# Patient Record
Sex: FEMALE | Race: WHITE | NOT HISPANIC OR LATINO | Employment: FULL TIME | URBAN - METROPOLITAN AREA
[De-identification: names, ages, dates, MRNs, and addresses within clinical notes are randomized per-mention and may not be internally consistent; named-entity substitution may affect disease eponyms.]

---

## 2017-01-02 ENCOUNTER — COMMUNICATION - HEALTHEAST (OUTPATIENT)
Dept: FAMILY MEDICINE | Facility: CLINIC | Age: 32
End: 2017-01-02

## 2017-01-05 ENCOUNTER — OFFICE VISIT - HEALTHEAST (OUTPATIENT)
Dept: FAMILY MEDICINE | Facility: CLINIC | Age: 32
End: 2017-01-05

## 2017-01-05 DIAGNOSIS — N91.2 AMENORRHEA: ICD-10-CM

## 2017-01-05 ASSESSMENT — MIFFLIN-ST. JEOR: SCORE: 1397.15

## 2017-01-26 ENCOUNTER — HOSPITAL ENCOUNTER (OUTPATIENT)
Dept: ULTRASOUND IMAGING | Facility: HOSPITAL | Age: 32
Discharge: HOME | End: 2017-01-26
Attending: FAMILY MEDICINE

## 2017-01-26 DIAGNOSIS — N91.2 AMENORRHEA: ICD-10-CM

## 2017-01-30 ENCOUNTER — COMMUNICATION - HEALTHEAST (OUTPATIENT)
Dept: FAMILY MEDICINE | Facility: CLINIC | Age: 32
End: 2017-01-30

## 2017-02-10 ENCOUNTER — COMMUNICATION - HEALTHEAST (OUTPATIENT)
Dept: FAMILY MEDICINE | Facility: CLINIC | Age: 32
End: 2017-02-10

## 2017-02-21 ENCOUNTER — COMMUNICATION - HEALTHEAST (OUTPATIENT)
Dept: FAMILY MEDICINE | Facility: CLINIC | Age: 32
End: 2017-02-21

## 2017-02-28 ENCOUNTER — PRENATAL OFFICE VISIT - HEALTHEAST (OUTPATIENT)
Dept: FAMILY MEDICINE | Facility: CLINIC | Age: 32
End: 2017-02-28

## 2017-02-28 DIAGNOSIS — Z3A.12 12 WEEKS GESTATION OF PREGNANCY: ICD-10-CM

## 2017-02-28 LAB — HIV 1+2 AB+HIV1 P24 AG SERPL QL IA: NEGATIVE

## 2017-02-28 ASSESSMENT — MIFFLIN-ST. JEOR: SCORE: 1405.09

## 2017-03-01 LAB
HBV SURFACE AG SERPL QL IA: NEGATIVE
SYPHILIS RPR SCREEN - HISTORICAL: NORMAL

## 2017-03-02 LAB
HPV INTERPRETATION - HISTORICAL: NORMAL
HPV INTERPRETER - HISTORICAL: NORMAL

## 2017-03-07 LAB
BKR LAB AP ABNORMAL BLEEDING: NO
BKR LAB AP BIRTH CONTROL/HORMONES: NORMAL
BKR LAB AP CERVICAL APPEARANCE: NORMAL
BKR LAB AP GYN ADEQUACY: NORMAL
BKR LAB AP GYN INTERPRETATION: NORMAL
BKR LAB AP HPV REFLEX: NORMAL
BKR LAB AP LMP: NORMAL
BKR LAB AP PATIENT STATUS: NORMAL
BKR LAB AP PREVIOUS ABNORMAL: NO
BKR LAB AP PREVIOUS NORMAL: 2014
HIGH RISK?: NO
PATH REPORT.COMMENTS IMP SPEC: NORMAL
RESULT FLAG (HE HISTORICAL CONVERSION): NORMAL

## 2017-03-28 ENCOUNTER — PRENATAL OFFICE VISIT - HEALTHEAST (OUTPATIENT)
Dept: FAMILY MEDICINE | Facility: CLINIC | Age: 32
End: 2017-03-28

## 2017-03-28 DIAGNOSIS — Z34.82 PRENATAL CARE, SUBSEQUENT PREGNANCY, SECOND TRIMESTER: ICD-10-CM

## 2017-04-24 ENCOUNTER — HOSPITAL ENCOUNTER (OUTPATIENT)
Dept: ULTRASOUND IMAGING | Facility: CLINIC | Age: 32
Discharge: HOME | End: 2017-04-24
Attending: FAMILY MEDICINE

## 2017-04-24 DIAGNOSIS — Z34.82 PRENATAL CARE, SUBSEQUENT PREGNANCY, SECOND TRIMESTER: ICD-10-CM

## 2017-04-25 ENCOUNTER — COMMUNICATION - HEALTHEAST (OUTPATIENT)
Dept: FAMILY MEDICINE | Facility: CLINIC | Age: 32
End: 2017-04-25

## 2017-04-27 ENCOUNTER — PRENATAL OFFICE VISIT - HEALTHEAST (OUTPATIENT)
Dept: FAMILY MEDICINE | Facility: CLINIC | Age: 32
End: 2017-04-27

## 2017-04-27 DIAGNOSIS — Z34.82 PRENATAL CARE, SUBSEQUENT PREGNANCY, SECOND TRIMESTER: ICD-10-CM

## 2017-05-25 ENCOUNTER — PRENATAL OFFICE VISIT - HEALTHEAST (OUTPATIENT)
Dept: FAMILY MEDICINE | Facility: CLINIC | Age: 32
End: 2017-05-25

## 2017-05-25 DIAGNOSIS — Z34.82 PRENATAL CARE, SUBSEQUENT PREGNANCY, SECOND TRIMESTER: ICD-10-CM

## 2017-06-20 ENCOUNTER — PRENATAL OFFICE VISIT - HEALTHEAST (OUTPATIENT)
Dept: FAMILY MEDICINE | Facility: CLINIC | Age: 32
End: 2017-06-20

## 2017-06-20 DIAGNOSIS — Z34.80 PRENATAL CARE, SUBSEQUENT PREGNANCY: ICD-10-CM

## 2017-07-03 ENCOUNTER — PRENATAL OFFICE VISIT - HEALTHEAST (OUTPATIENT)
Dept: FAMILY MEDICINE | Facility: CLINIC | Age: 32
End: 2017-07-03

## 2017-07-03 DIAGNOSIS — Z34.80 PRENATAL CARE, SUBSEQUENT PREGNANCY: ICD-10-CM

## 2017-07-25 ENCOUNTER — PRENATAL OFFICE VISIT - HEALTHEAST (OUTPATIENT)
Dept: FAMILY MEDICINE | Facility: CLINIC | Age: 32
End: 2017-07-25

## 2017-07-25 DIAGNOSIS — Z34.80 PRENATAL CARE, SUBSEQUENT PREGNANCY: ICD-10-CM

## 2017-08-15 ENCOUNTER — PRENATAL OFFICE VISIT - HEALTHEAST (OUTPATIENT)
Dept: FAMILY MEDICINE | Facility: CLINIC | Age: 32
End: 2017-08-15

## 2017-08-15 DIAGNOSIS — Z34.90 PREGNANCY: ICD-10-CM

## 2017-08-22 ENCOUNTER — PRENATAL OFFICE VISIT - HEALTHEAST (OUTPATIENT)
Dept: FAMILY MEDICINE | Facility: CLINIC | Age: 32
End: 2017-08-22

## 2017-08-22 DIAGNOSIS — Z34.90 PREGNANCY: ICD-10-CM

## 2017-08-29 ENCOUNTER — PRENATAL OFFICE VISIT - HEALTHEAST (OUTPATIENT)
Dept: FAMILY MEDICINE | Facility: CLINIC | Age: 32
End: 2017-08-29

## 2017-08-29 DIAGNOSIS — Z34.90 PREGNANCY: ICD-10-CM

## 2017-08-31 ENCOUNTER — COMMUNICATION - HEALTHEAST (OUTPATIENT)
Dept: FAMILY MEDICINE | Facility: CLINIC | Age: 32
End: 2017-08-31

## 2017-08-31 ENCOUNTER — PRENATAL OFFICE VISIT - HEALTHEAST (OUTPATIENT)
Dept: FAMILY MEDICINE | Facility: CLINIC | Age: 32
End: 2017-08-31

## 2017-08-31 DIAGNOSIS — R51.9 HEADACHE: ICD-10-CM

## 2017-08-31 DIAGNOSIS — Z34.80 PRENATAL CARE, SUBSEQUENT PREGNANCY: ICD-10-CM

## 2017-09-05 ENCOUNTER — PRENATAL OFFICE VISIT - HEALTHEAST (OUTPATIENT)
Dept: FAMILY MEDICINE | Facility: CLINIC | Age: 32
End: 2017-09-05

## 2017-09-05 DIAGNOSIS — Z34.90 PREGNANCY: ICD-10-CM

## 2017-09-07 ENCOUNTER — COMMUNICATION - HEALTHEAST (OUTPATIENT)
Dept: FAMILY MEDICINE | Facility: CLINIC | Age: 32
End: 2017-09-07

## 2017-09-07 ENCOUNTER — ANESTHESIA - HEALTHEAST (OUTPATIENT)
Dept: OBGYN | Facility: HOSPITAL | Age: 32
End: 2017-09-07

## 2017-09-11 ENCOUNTER — COMMUNICATION - HEALTHEAST (OUTPATIENT)
Dept: OBGYN | Facility: HOSPITAL | Age: 32
End: 2017-09-11

## 2017-09-11 ENCOUNTER — COMMUNICATION - HEALTHEAST (OUTPATIENT)
Dept: FAMILY MEDICINE | Facility: CLINIC | Age: 32
End: 2017-09-11

## 2017-10-19 ENCOUNTER — OFFICE VISIT - HEALTHEAST (OUTPATIENT)
Dept: FAMILY MEDICINE | Facility: CLINIC | Age: 32
End: 2017-10-19

## 2017-10-19 DIAGNOSIS — F41.9 ANXIETY: ICD-10-CM

## 2017-10-19 DIAGNOSIS — M25.50 JOINT PAIN: ICD-10-CM

## 2017-10-19 ASSESSMENT — MIFFLIN-ST. JEOR: SCORE: 1405.09

## 2017-11-13 ENCOUNTER — COMMUNICATION - HEALTHEAST (OUTPATIENT)
Dept: FAMILY MEDICINE | Facility: CLINIC | Age: 32
End: 2017-11-13

## 2017-11-27 ENCOUNTER — OFFICE VISIT - HEALTHEAST (OUTPATIENT)
Dept: FAMILY MEDICINE | Facility: CLINIC | Age: 32
End: 2017-11-27

## 2017-11-27 DIAGNOSIS — M25.50 ARTHRALGIA: ICD-10-CM

## 2017-11-27 ASSESSMENT — MIFFLIN-ST. JEOR: SCORE: 1400.55

## 2017-11-30 ENCOUNTER — COMMUNICATION - HEALTHEAST (OUTPATIENT)
Dept: FAMILY MEDICINE | Facility: CLINIC | Age: 32
End: 2017-11-30

## 2017-12-01 ENCOUNTER — COMMUNICATION - HEALTHEAST (OUTPATIENT)
Dept: FAMILY MEDICINE | Facility: CLINIC | Age: 32
End: 2017-12-01

## 2017-12-08 ENCOUNTER — COMMUNICATION - HEALTHEAST (OUTPATIENT)
Dept: FAMILY MEDICINE | Facility: CLINIC | Age: 32
End: 2017-12-08

## 2017-12-19 ENCOUNTER — COMMUNICATION - HEALTHEAST (OUTPATIENT)
Dept: FAMILY MEDICINE | Facility: CLINIC | Age: 32
End: 2017-12-19

## 2018-01-04 ENCOUNTER — RECORDS - HEALTHEAST (OUTPATIENT)
Dept: GENERAL RADIOLOGY | Facility: CLINIC | Age: 33
End: 2018-01-04

## 2018-01-04 ENCOUNTER — OFFICE VISIT - HEALTHEAST (OUTPATIENT)
Dept: RHEUMATOLOGY | Facility: CLINIC | Age: 33
End: 2018-01-04

## 2018-01-04 DIAGNOSIS — M05.9 RHEUMATOID ARTHRITIS WITH RHEUMATOID FACTOR, UNSPECIFIED (H): ICD-10-CM

## 2018-01-04 DIAGNOSIS — G89.29 CHRONIC BILATERAL LOW BACK PAIN WITHOUT SCIATICA: ICD-10-CM

## 2018-01-04 DIAGNOSIS — M54.50 CHRONIC BILATERAL LOW BACK PAIN WITHOUT SCIATICA: ICD-10-CM

## 2018-01-04 DIAGNOSIS — M25.50 PAIN IN UNSPECIFIED JOINT: ICD-10-CM

## 2018-01-04 DIAGNOSIS — M25.50 MULTIPLE JOINT PAIN: ICD-10-CM

## 2018-01-04 DIAGNOSIS — M05.9 SEROPOSITIVE RHEUMATOID ARTHRITIS (H): ICD-10-CM

## 2018-01-04 LAB — HEP C HIM: NORMAL

## 2018-01-04 ASSESSMENT — MIFFLIN-ST. JEOR: SCORE: 1382.41

## 2018-01-05 LAB
HCV AB SERPL QL IA: NEGATIVE
HLA-B27 RESULT - HISTORICAL: POSITIVE
INTERPRETATION: NORMAL
QTF INTERPRETATION: NORMAL
QTF MITOGEN - NIL: >10 IU/ML
QTF NIL: 0.02 IU/ML
QTF RESULT: NEGATIVE
QTF TB ANTIGEN - NIL: 0.09 IU/ML

## 2018-01-08 LAB — ANA SER QL: 0.5 U

## 2018-01-23 ENCOUNTER — COMMUNICATION - HEALTHEAST (OUTPATIENT)
Dept: FAMILY MEDICINE | Facility: CLINIC | Age: 33
End: 2018-01-23

## 2018-01-25 ENCOUNTER — OFFICE VISIT - HEALTHEAST (OUTPATIENT)
Dept: FAMILY MEDICINE | Facility: CLINIC | Age: 33
End: 2018-01-25

## 2018-01-25 DIAGNOSIS — J02.9 SORE THROAT: ICD-10-CM

## 2018-01-25 LAB — DEPRECATED S PYO AG THROAT QL EIA: NORMAL

## 2018-01-26 LAB — GROUP A STREP BY PCR: NORMAL

## 2018-02-09 ENCOUNTER — COMMUNICATION - HEALTHEAST (OUTPATIENT)
Dept: FAMILY MEDICINE | Facility: CLINIC | Age: 33
End: 2018-02-09

## 2018-03-23 ENCOUNTER — COMMUNICATION - HEALTHEAST (OUTPATIENT)
Dept: FAMILY MEDICINE | Facility: CLINIC | Age: 33
End: 2018-03-23

## 2018-04-05 ENCOUNTER — TRANSFERRED RECORDS (OUTPATIENT)
Dept: HEALTH INFORMATION MANAGEMENT | Facility: CLINIC | Age: 33
End: 2018-04-05

## 2018-04-05 LAB
ALT SERPL-CCNC: 14 IU/L
CREAT SERPL-MCNC: 0.91 MG/DL (ref 0.57–1.11)
GFR SERPL CREATININE-BSD FRML MDRD: >60 ML/MIN/1.73M2

## 2018-04-24 ENCOUNTER — COMMUNICATION - HEALTHEAST (OUTPATIENT)
Dept: FAMILY MEDICINE | Facility: CLINIC | Age: 33
End: 2018-04-24

## 2018-06-04 ENCOUNTER — RECORDS - HEALTHEAST (OUTPATIENT)
Dept: ADMINISTRATIVE | Facility: OTHER | Age: 33
End: 2018-06-04

## 2018-06-26 ENCOUNTER — COMMUNICATION - HEALTHEAST (OUTPATIENT)
Dept: FAMILY MEDICINE | Facility: CLINIC | Age: 33
End: 2018-06-26

## 2018-06-28 ENCOUNTER — COMMUNICATION - HEALTHEAST (OUTPATIENT)
Dept: FAMILY MEDICINE | Facility: CLINIC | Age: 33
End: 2018-06-28

## 2018-09-24 ENCOUNTER — COMMUNICATION - HEALTHEAST (OUTPATIENT)
Dept: FAMILY MEDICINE | Facility: CLINIC | Age: 33
End: 2018-09-24

## 2018-11-14 ENCOUNTER — COMMUNICATION - HEALTHEAST (OUTPATIENT)
Dept: FAMILY MEDICINE | Facility: CLINIC | Age: 33
End: 2018-11-14

## 2018-11-14 LAB — ALT SERPL-CCNC: 15 IU/L

## 2018-11-23 ENCOUNTER — COMMUNICATION - HEALTHEAST (OUTPATIENT)
Dept: FAMILY MEDICINE | Facility: CLINIC | Age: 33
End: 2018-11-23

## 2018-11-23 ENCOUNTER — AMBULATORY - HEALTHEAST (OUTPATIENT)
Dept: FAMILY MEDICINE | Facility: CLINIC | Age: 33
End: 2018-11-23

## 2018-11-23 DIAGNOSIS — Z30.430 ENCOUNTER FOR INSERTION OF INTRAUTERINE CONTRACEPTIVE DEVICE (IUD): ICD-10-CM

## 2018-11-23 DIAGNOSIS — Z30.430 ENCOUNTER FOR IUD INSERTION: ICD-10-CM

## 2018-11-23 LAB
HCG UR QL: NEGATIVE
SP GR UR STRIP: 1.02 (ref 1–1.03)

## 2018-11-23 ASSESSMENT — MIFFLIN-ST. JEOR: SCORE: 1407.35

## 2018-12-11 ENCOUNTER — RECORDS - HEALTHEAST (OUTPATIENT)
Dept: ADMINISTRATIVE | Facility: OTHER | Age: 33
End: 2018-12-11

## 2019-01-03 ENCOUNTER — COMMUNICATION - HEALTHEAST (OUTPATIENT)
Dept: FAMILY MEDICINE | Facility: CLINIC | Age: 34
End: 2019-01-03

## 2019-01-09 ENCOUNTER — COMMUNICATION - HEALTHEAST (OUTPATIENT)
Dept: FAMILY MEDICINE | Facility: CLINIC | Age: 34
End: 2019-01-09

## 2019-01-18 ENCOUNTER — COMMUNICATION - HEALTHEAST (OUTPATIENT)
Dept: FAMILY MEDICINE | Facility: CLINIC | Age: 34
End: 2019-01-18

## 2019-02-20 ENCOUNTER — COMMUNICATION - HEALTHEAST (OUTPATIENT)
Dept: FAMILY MEDICINE | Facility: CLINIC | Age: 34
End: 2019-02-20

## 2019-04-08 ENCOUNTER — COMMUNICATION - HEALTHEAST (OUTPATIENT)
Dept: FAMILY MEDICINE | Facility: CLINIC | Age: 34
End: 2019-04-08

## 2019-06-17 ENCOUNTER — TRANSFERRED RECORDS (OUTPATIENT)
Dept: HEALTH INFORMATION MANAGEMENT | Facility: CLINIC | Age: 34
End: 2019-06-17

## 2019-06-17 LAB
ALT SERPL-CCNC: 10 IU/L
AST SERPL-CCNC: 16 U/L (ref 5–34)
CREAT SERPL-MCNC: 0.78 MG/DL (ref 0.57–1.11)
GFR SERPL CREATININE-BSD FRML MDRD: >60 ML/MIN/1.73M2

## 2019-09-12 ENCOUNTER — AMBULATORY - HEALTHEAST (OUTPATIENT)
Dept: FAMILY MEDICINE | Facility: CLINIC | Age: 34
End: 2019-09-12

## 2019-09-12 DIAGNOSIS — M06.9 RHEUMATOID ARTHRITIS, INVOLVING UNSPECIFIED SITE, UNSPECIFIED RHEUMATOID FACTOR PRESENCE: ICD-10-CM

## 2019-09-17 ENCOUNTER — TELEPHONE (OUTPATIENT)
Dept: RHEUMATOLOGY | Facility: CLINIC | Age: 34
End: 2019-09-17

## 2019-09-17 DIAGNOSIS — M06.9 RA (RHEUMATOID ARTHRITIS) (H): Primary | ICD-10-CM

## 2019-09-17 NOTE — LETTER
December 19, 2019    Geovanna Schwartz  [address]  [facility] [address]    Dear Referring Provider,  Thank you for the referral. After careful review by the Rheumatologist, your patient does not meed the criteria for an appointment. We encourage you to refer your patient to one of our community sites:    [facility]    Provider of your choice  Thank you for your support and understanding.    Sincerely,  The Division of Arthritis and Autoimmune Disorders

## 2019-09-27 ENCOUNTER — COMMUNICATION - HEALTHEAST (OUTPATIENT)
Dept: FAMILY MEDICINE | Facility: CLINIC | Age: 34
End: 2019-09-27

## 2019-10-21 NOTE — TELEPHONE ENCOUNTER
[facility] Call Center    Phone Message    May a detailed message be left on voicemail: yes    Reason for Call: Other: Pt calling again regarding referral to Rhematology Clinic. Per Pt, she has not received a call back wanted to check in on status. Please call.      Action Taken: Message routed to:  [facility] Rheumatology Clinic

## 2019-10-24 NOTE — TELEPHONE ENCOUNTER
Patient is scheduled for a Rheumatology new patient referral/consult telephonic intake call with Referral Specialist on 10/30/2019 at 2pm. Patient is aware that if the appointment is missed they will have to call back and reschedule OR if patient needs to reschedule, they may contact the clinic directly at [phone]. Patient verbalized understanding and has no further questions or concerns at this time.    Beatriz Cervantes CMA   10/24/2019 11:16 AM

## 2019-10-30 NOTE — TELEPHONE ENCOUNTER
[facility] Call [facility]    Phone Message    May a detailed message be left on voicemail: yes    Reason for Call: Other: Pt calling in for her 2pm phone appointment with Beatriz. Please advise      Action Taken: Message routed to:  [facility] Rheum

## 2019-11-13 NOTE — TELEPHONE ENCOUNTER
Left a message for patient to call back regarding the referral process.  Beatriz Cervantes, TONIO   11/13/2019 3:14 PM

## 2019-11-14 NOTE — TELEPHONE ENCOUNTER
Rescheduled telephone appointment on 11/15/2019 at 3pm.  Beatriz Cervantes CMA   11/14/2019 11:35 AM

## 2019-11-15 NOTE — TELEPHONE ENCOUNTER
General Rheumatology Intake Form    Reason for referral: RA, patient was diagnosed 2 years ago    Referring provider: Vicky Hu at [facility]    Who is your primary care provider/clinic? Vicky Hu    Past Rheumatologist: Yes    Clinic/Provider name: Patient started at [facility] 2 years ago and then saw Dr. Weiner for a 2nd opinion and currently sees, Patient had an insurance change and would like to transfer all care to [facility]     Is this a second opinion or transfer of care? Transfer of care   Are you wanting to establish care here? Yes     What is the reason that you do not want to go back to your previous Rheumatologist? Insurance change     Have you ever been diagnosed with fibromyalgia/generalized pain syndrome/chronic pain/chronic fatigue? No.     Who manages your care for this issue now? Dr. Weiner     What is your most urgent concern at this time? Patient stated that she has been on prednisone  Since she was diagnosed and marino she tried stopping it, it caused too much pain and is looking for a better treatment plan.   Are you currently having any joint pain? Yes  If so, can you describe the pain? aching   Location/Joint involvement: hands, knuckles   Severity: mild   Muscle involvement: no   Onset: 2 years ago   Wakes from sleep: No   Morning stiffness: no   Are you experiencing any joint inflammation or swelling? No    Are you currently taking any pain medications? No    Have you seen any specialist related to the reason you are coming here? no    Where are we expecting records (labs, imaging or pathology) from? Need records from [facility]    Let patient know that I will send this to our clinic  who will contact her to assist with obtaining records from above clinic. As soon as we have those records we will reach out to the patient to schedule an appointment. Patient is aware that we are scheduled out 6 months. Patient verbalizes understanding and denies further  questions at this time. Forwarding to clinic support to assist with records retrieval.    Beatriz Cervantes CMA   11/15/2019 3:16 PM

## 2019-12-16 ENCOUNTER — COMMUNICATION - HEALTHEAST (OUTPATIENT)
Dept: FAMILY MEDICINE | Facility: CLINIC | Age: 34
End: 2019-12-16

## 2019-12-16 DIAGNOSIS — J01.00 ACUTE MAXILLARY SINUSITIS, RECURRENCE NOT SPECIFIED: ICD-10-CM

## 2019-12-19 NOTE — TELEPHONE ENCOUNTER
Rheumatologist has reviewed chart and has made the determination that the patient does not meet the criteria for an appointment.  A letter has been sent the referring encouraging them to send the patient to one of our community sites. Patient needs to follow up with their referring or PCP for further direction.     Beatriz Cervantes CMA   12/19/2019 2:53 PM

## 2020-01-20 ENCOUNTER — TELEPHONE (OUTPATIENT)
Dept: RHEUMATOLOGY | Facility: CLINIC | Age: 35
End: 2020-01-20

## 2020-01-20 NOTE — TELEPHONE ENCOUNTER
[facility] Call Center    Phone Message    May a detailed message be left on voicemail: yes    Reason for Call: Other: Pt calling in to discuss her referral anf where she is in the intake process. Please follow up when available. Thank you      Action Taken: Message routed to:  [facility] Rheum

## 2020-01-24 NOTE — TELEPHONE ENCOUNTER
Please see encounter dated 9/17/2019 as this is handled in that encounter.    Beatriz Cervantes CMA   1/24/2020 10:21 AM

## 2020-01-30 ENCOUNTER — COMMUNICATION - HEALTHEAST (OUTPATIENT)
Dept: FAMILY MEDICINE | Facility: CLINIC | Age: 35
End: 2020-01-30

## 2020-01-30 DIAGNOSIS — M25.50 MULTIPLE JOINT PAIN: ICD-10-CM

## 2020-01-30 DIAGNOSIS — M05.9 SEROPOSITIVE RHEUMATOID ARTHRITIS (H): ICD-10-CM

## 2020-02-22 ENCOUNTER — COMMUNICATION - HEALTHEAST (OUTPATIENT)
Dept: FAMILY MEDICINE | Facility: CLINIC | Age: 35
End: 2020-02-22

## 2020-02-22 DIAGNOSIS — F43.22 ADJUSTMENT DISORDER WITH ANXIOUS MOOD: ICD-10-CM

## 2020-03-20 ENCOUNTER — COMMUNICATION - HEALTHEAST (OUTPATIENT)
Dept: FAMILY MEDICINE | Facility: CLINIC | Age: 35
End: 2020-03-20

## 2020-09-23 RX ORDER — PREDNISONE 5 MG/1
2.5 TABLET ORAL
COMMUNITY
Start: 2018-06-04 | End: 2020-09-24

## 2020-09-23 NOTE — PROGRESS NOTES
"Geovanna Schwartz is a 34 year old female who is being evaluated via a billable video visit.      The patient has been notified of following:     \"This video visit will be conducted via a call between you and your physician/provider. We have found that certain health care needs can be provided without the need for an in-person physical exam.  This service lets us provide the care you need with a video conversation.  If a prescription is necessary we can send it directly to your pharmacy.  If lab work is needed we can place an order for that and you can then stop by our lab to have the test done at a later time.    Video visits are billed at different rates depending on your insurance coverage.  Please reach out to your insurance provider with any questions.    If during the course of the call the physician/provider feels a video visit is not appropriate, you will not be charged for this service.\"    Patient has given verbal consent for Video visit? Yes  How would you like to obtain your AVS? MyChart  If you are dropped from the video visit, the video invite should be resent to: Text to cell phone: [phone]  Will anyone else be joining your video visit? No    Rheumatology Video Visit      Geovanna Schwartz MRN# [id number]   YOB: 1985 Age: 34 year old      Date of visit: 9/24/20   PCP: Dr. Vicky Hu at [facility] ([facility])    Chief Complaint   Patient presents with:  Consult    Assessment and Plan     1. Seropositive Nonerosive Rheumatoid Arthritis (.3, .8): Dx'd 2017.  Established care with me on 9/24/2020.  Previously on HCQ (GI upset), SSZ (effective). Currently on prednisone 2.5mg daily.  Symmetric swelling of the bilateral second MCPs and wrists seen today.  Previously improved with sulfasalazine once daily so the dose was increased to twice daily but then she was lost to follow-up due to insurance change; now only on prednisone and establishing care in this " clinic today.  We reviewed the diagnosis of rheumatoid arthritis and treatment options.  She is currently on birth control with an IUD but pregnancy is still a consideration in the future so will avoid leflunomide.  Discussed methotrexate versus sulfasalazine, noting that both should be discontinued 3 months prior to conception.  We also discussed Cimzia briefly as an option if conception is going to be cleared soon but she says that it is unlikely she is going to have a child based on planning between she and her .  Given that she responded well to sulfasalazine in the past it is reasonable to restart this if needed in the future add methotrexate.  - Start sulfasalazine 500 mg twice daily x7 days, then 1000 mg twice daily thereafter  - Continue prednisone 2.5mg daily  - X-rays to establish new baseline: Bilateral hand/feet  - Labs within next 1 week: CBC, creatinine, hepatic panel, ESR, CRP, hepatitis B/C, HIV, Lyme  - Labs monthly [id number]: CBC, Cr, Hepatic Panel  - Labs in 3 months: CBC, Creatinine, Hepatic Panel, ESR, CRP, glucose    # Sulfasalazine Risks and Benefits: The risks and benefits of sulfasalazine were discussed in detail and the patient verbalized understanding.  The risks discussed include, but are not limited to, the risk for hypersensitivity, anaphylaxis, anaphylactoid reactions, infections, bone marrow suppression,  hepatotoxicity, nausea, vomiting, and GI upset. I encouraged reviewing the package insert and asking any questions about the medication.      # Pregnancy planning: IUD; she verbalized understanding that DMARDs will need adjustment at least 3 months prior to conception    2. Low vitamin D: low in the past and responded to supplemental vitamin D. Not taking vitamin D supplements at this time.  - Start vitamin D 1000 IU daily  - Lab in 3 mo: vitamin D, Calcium    3. Cigarette Smoking: advised complete cessation.  Discussed the link between RA and cigarette smoking.     4.   Vaccinations: Vaccinations reviewed with Ms. Schwartz.  Risks and benefits of vaccinations were discussed.   I explained that Shingrix is used off label when under 50 years old and that the safety and efficacy of the vaccine has not been tested in people younger than 50 years old.   - Influenza: encouraged yearly vaccination  - Oibmxki60: advised receiving  - Oqbvtzlyj35: to receive at least 8 weeks after pqmnhjg19 is administered  - Shingrix: Advised receiving; patient is going to check on insurance coverage before determining if it is going to be received    # Relevant labs and imaging were reviewed with the patient    # High risk medication toxicity monitoring: discussion and labs reviewed; appropriate labs ordered. See above.  Instructed that if confirmed to have COVID-19 or exposure to someone with confirmed COVID-19 to call this clinic for directions on DMARD management.    # Note that this is a virtual visit to reduce the risk of COVID-19 exposure during this current pandemic.      # Considered to be at high risk of complications from the COVID-19 virus.  It is recommended to limit contact with other people and if possible to work remotely or provide a leave of absence to reduce the risk for COVID-19.      Ms. Schwartz verbalized agreement with and understanding of the rational for the diagnosis and treatment plan.  All questions were answered to best of my ability and the patient's satisfaction. Ms. Schwartz was advised to contact the clinic with any questions that may arise after the clinic visit.      Thank you for involving me in the care of the patient    Return to clinic: 3 - 4 months      HPI   Geovanna Schwartz is a 34 year old female with a past medical history significant for allergic rhinitis, tobacco use, and rheumatoid arthritis who is seen for initial rheumatology evaluation in this clinic.    6/7/2019 [facility] rheumatology clinic note by Dr. Wade Weiner document seropositive rheumatoid  arthritis on prednisone 2.5 mg daily and sulfasalazine 1000 mg twice daily    Today, 9/24/2020: , Ms. Schwartz reports that she has RA.  On prednisone 2.5mg daily now and it helps.  Previously on SSZ 1000mg daily when she was following with [facility] rheumatology and this was effective but not completely controlling symptoms so she was going to increase to BID but then lost to follow-up due to insurance change. Wrists and 2nd MCPs are the most affected joints; intermittent pain and stiffness in these joints; worse in the AM.  If very swollen in these joints then she doesn't want to move them at all, but when able to move without much pain then she feels better and better with increased physical activity. Felt better when on SSZ but not complete control when on SSZ once daily; so she had just started SSZ BID dosing before changing insurance and then not having rheumatology follow-up .  HCQ was used without much improvement and she had GI upset with it; started 3 years ago when first dx'd and breastfeeding.  Currently not planning to have additional children. Birth control with IUD.   Morning stiffness for >45 min.  Was on vitamin D but has stopped.     Denies fevers, chills, nausea, vomiting, constipation, diarrhea. No abdominal pain. No chest pain/pressure, palpitations, or shortness of breath. No LE swelling. No neck pain. No oral or nasal sores.  No rash. No sicca symptoms.  No history of DVT or pulmonary embolism; one 1st trimester miscarriage.  No history of serositis.  No hist blood ory of Raynaud's Phenomenon.     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  CV: No chest pain, pressure, palpitations, or dyspnea on exertion.  PULM: No SOB, wheeze, cough.  GI: No nausea, vomiting, constipation, diarrhea. No abdominal  pain.  MSK: See HPI.  NEURO: No numbness, tingling, or weakness.  EXT: No LE swelling  PSYCH: Negative    Active Problem List     Patient Active Problem List   Diagnosis     Personal history of contraception, presenting hazards to health     Allergic rhinitis due to other allergen     Viral warts     Tobacco use disorder     Other acne     Female genital symptoms     Adjustment disorder with anxiety     CARDIOVASCULAR SCREENING; LDL GOAL LESS THAN 160     Past Medical History     Past Medical History:   Diagnosis Date     Depressive disorder, not elsewhere classified      PAP SMEAR CERVIX W LGSIL 12/29/2006    Colpo HPV 6 and 16 DNA.-   RUTHANN 1 on of her cervical bx.   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 This is a more severe abnormality than indicated by the previous biopsies. I would recommend Pap smears as outlined. Please call. Alejandro Peter M.D. She will need follow-up paps at 4,8,12 months. If these are n     Past Surgical History     Past Surgical History:   Procedure Laterality Date     LEEP TX, CERVICAL  2/20/2007    RUTHANN I-II     SURGICAL HISTORY OF -   6/22/2004    Left wrist arthroscopy plus triangular      Allergy     Allergies   Allergen Reactions     Hydroxychloroquine Other (See Comments)     Stomach upset, diarrhea     Current Medication List     Current Outpatient Medications   Medication Sig     CELEXA 10 MG OR TABS 30 mg as of 9/23/2020      MG OR TABS 1 TABLET 3 TIMES DAILY     predniSONE (DELTASONE) 5 MG tablet Take 2.5 mg by mouth     BUPROPION HCL# 300 MG OR TB24 1 TABLET DAILY (Patient not taking: No sig reported)     LORATADINE 10 MG OR TABS ONE DAILY (Patient not taking: No sig reported)     SPRINTEC 28 0.25-35 MG-MCG OR TABS 1 TABLET DAILY (Patient not taking: No sig reported)     No current facility-administered medications for this visit.          Social History   See HPI    Family  "History     Family History   Problem Relation Age of Onset     Hypertension Father      Allergies Father      Allergies Brother      Allergies Sister      Physical Exam     Temp Readings from Last 3 Encounters:   04/17/09 97.8  F (36.6  C) (Tympanic)   12/13/05 98.7  F (37.1  C) (Oral)   11/14/05 98.5  F (36.9  C) (Oral)     BP Readings from Last 5 Encounters:   04/17/09 110/60   12/17/08 130/68   03/27/08 118/78   01/10/08 120/74   11/12/07 118/72     Pulse Readings from Last 1 Encounters:   04/17/09 88     Resp Readings from Last 1 Encounters:   No data found for Resp     Estimated body mass index is 22.76 kg/m  as calculated from the following:    Height as of 4/17/09: 1.676 m (5' 6\").    Weight as of 4/17/09: 64 kg (141 lb).    GEN: NAD. Healthy appearing adult.   HEENT: MMM.  Anicteric, noninjected sclera. No obvious external lesions of the ear and nose. Hearing intact.  PULM: No increased work of breathing  MSK:  Swelling of the bilateral 2nd MCPs and wrists.  PIPs and DIPs okay.    SKIN: No rash or jaundice seen  PSYCH: Alert. Appropriate.     Labs / Imaging (select studies)     CMP  Recent Labs   Lab Test 06/17/19 11/14/18 04/05/18   CR 0.78  --  0.91   GFRESTIMATED >60  --  >60   GFRESTBLACK >60  --  >60   AST 16  --   --    ALT 10 15 14     [facility] Labs reviewed in CareEverywhere:  11/27/2017: .3, .8  1/4/2018: HCV ab negative, SABRINA negative    Immunization History     Immunization History   Administered Date(s) Administered     HPV 01/09/2007, 03/20/2007, 03/27/2008          Chart documentation done in part with Dragon Voice recognition Software. Although reviewed after completion, some word and grammatical error may remain.    Video-Visit Details    Type of service:  Video Visit    Video Start Time: 9:04 AM  Video End Time: 9:35 AM    Originating Location (pt. Location): Home in [place]    Distant Location (provider location):  Home    Platform used for Video Visit: Suresh Toussaint, " MD

## 2020-09-24 ENCOUNTER — RECORDS - HEALTHEAST (OUTPATIENT)
Dept: ADMINISTRATIVE | Facility: OTHER | Age: 35
End: 2020-09-24

## 2020-09-24 ENCOUNTER — VIRTUAL VISIT (OUTPATIENT)
Dept: RHEUMATOLOGY | Facility: CLINIC | Age: 35
End: 2020-09-24
Payer: COMMERCIAL

## 2020-09-24 DIAGNOSIS — F17.200 TOBACCO USE DISORDER: ICD-10-CM

## 2020-09-24 DIAGNOSIS — M05.9 SEROPOSITIVE RHEUMATOID ARTHRITIS (H): Primary | ICD-10-CM

## 2020-09-24 DIAGNOSIS — R79.89 LOW VITAMIN D LEVEL: ICD-10-CM

## 2020-09-24 DIAGNOSIS — Z79.899 HIGH RISK MEDICATION USE: ICD-10-CM

## 2020-09-24 PROCEDURE — 99204 OFFICE O/P NEW MOD 45 MIN: CPT | Mod: 95 | Performed by: INTERNAL MEDICINE

## 2020-09-24 RX ORDER — SULFASALAZINE 500 MG/1
TABLET, DELAYED RELEASE ORAL
Qty: 120 TABLET | Refills: 3 | Status: SHIPPED | OUTPATIENT
Start: 2020-09-24 | End: 2021-01-06

## 2020-09-24 RX ORDER — PREDNISONE 2.5 MG/1
2.5 TABLET ORAL DAILY
Qty: 90 TABLET | Refills: 1 | Status: SHIPPED | OUTPATIENT
Start: 2020-09-24 | End: 2021-01-06

## 2020-09-24 NOTE — Clinical Note
Please fax my clinic note dated 9/24/2020 to Ms. Shcwartz's PCP:    Dr. Vicky Hu at [facility] ([facility])    Thank you,  Toy Toussaint MD  9/24/2020 10:06 AM

## 2020-09-24 NOTE — PATIENT INSTRUCTIONS
Start sulfasalazine 500 mg twice daily x7 days, then 1000 mg twice daily thereafter    Continue prednisone 2.5mg daily    Start Vitamin D 1000 IU daily    Labs and x-rays now    Labs then monthly for the next 3 months      Recommended vaccines:   - Influenza: yearly  - Mseghjj19: once  - Fosxtcwoz18: to receive at least 8 weeks after cxoxylu01 is administered  - Shingrix: 2 doses  by 2-6 months; check with your insurance for cost

## 2020-09-28 ENCOUNTER — HOSPITAL ENCOUNTER (OUTPATIENT)
Dept: GENERAL RADIOLOGY | Facility: CLINIC | Age: 35
End: 2020-09-28
Attending: INTERNAL MEDICINE
Payer: COMMERCIAL

## 2020-09-28 DIAGNOSIS — Z79.899 HIGH RISK MEDICATION USE: ICD-10-CM

## 2020-09-28 DIAGNOSIS — M05.9 SEROPOSITIVE RHEUMATOID ARTHRITIS (H): ICD-10-CM

## 2020-09-28 LAB
ALBUMIN SERPL-MCNC: 3.8 G/DL (ref 3.4–5)
ALP SERPL-CCNC: 68 U/L (ref 40–150)
ALT SERPL W P-5'-P-CCNC: 18 U/L (ref 0–50)
AST SERPL W P-5'-P-CCNC: 19 U/L (ref 0–45)
BASOPHILS # BLD AUTO: 0 10E9/L (ref 0–0.2)
BASOPHILS NFR BLD AUTO: 0.5 %
BILIRUB DIRECT SERPL-MCNC: 0.1 MG/DL (ref 0–0.2)
BILIRUB SERPL-MCNC: 0.4 MG/DL (ref 0.2–1.3)
CREAT SERPL-MCNC: 0.79 MG/DL (ref 0.52–1.04)
CRP SERPL-MCNC: 6.8 MG/L (ref 0–8)
DIFFERENTIAL METHOD BLD: NORMAL
EOSINOPHIL # BLD AUTO: 0.2 10E9/L (ref 0–0.7)
EOSINOPHIL NFR BLD AUTO: 1.9 %
ERYTHROCYTE [DISTWIDTH] IN BLOOD BY AUTOMATED COUNT: 11.6 % (ref 10–15)
ERYTHROCYTE [SEDIMENTATION RATE] IN BLOOD BY WESTERGREN METHOD: 4 MM/H (ref 0–20)
GFR SERPL CREATININE-BSD FRML MDRD: >90 ML/MIN/{1.73_M2}
GLUCOSE SERPL-MCNC: 83 MG/DL (ref 70–99)
HCT VFR BLD AUTO: 41.8 % (ref 35–47)
HGB BLD-MCNC: 14.3 G/DL (ref 11.7–15.7)
LYMPHOCYTES # BLD AUTO: 2.2 10E9/L (ref 0.8–5.3)
LYMPHOCYTES NFR BLD AUTO: 27.4 %
MCH RBC QN AUTO: 31.2 PG (ref 26.5–33)
MCHC RBC AUTO-ENTMCNC: 34.2 G/DL (ref 31.5–36.5)
MCV RBC AUTO: 91 FL (ref 78–100)
MONOCYTES # BLD AUTO: 0.9 10E9/L (ref 0–1.3)
MONOCYTES NFR BLD AUTO: 11.6 %
NEUTROPHILS # BLD AUTO: 4.6 10E9/L (ref 1.6–8.3)
NEUTROPHILS NFR BLD AUTO: 58.6 %
PLATELET # BLD AUTO: 198 10E9/L (ref 150–450)
PROT SERPL-MCNC: 7.5 G/DL (ref 6.8–8.8)
RBC # BLD AUTO: 4.58 10E12/L (ref 3.8–5.2)
WBC # BLD AUTO: 7.9 10E9/L (ref 4–11)

## 2020-09-28 PROCEDURE — 87340 HEPATITIS B SURFACE AG IA: CPT | Performed by: INTERNAL MEDICINE

## 2020-09-28 PROCEDURE — 73630 X-RAY EXAM OF FOOT: CPT | Mod: 50

## 2020-09-28 PROCEDURE — 86140 C-REACTIVE PROTEIN: CPT | Performed by: INTERNAL MEDICINE

## 2020-09-28 PROCEDURE — 86618 LYME DISEASE ANTIBODY: CPT | Performed by: INTERNAL MEDICINE

## 2020-09-28 PROCEDURE — 80076 HEPATIC FUNCTION PANEL: CPT | Performed by: INTERNAL MEDICINE

## 2020-09-28 PROCEDURE — 73130 X-RAY EXAM OF HAND: CPT | Mod: 50

## 2020-09-28 PROCEDURE — 86704 HEP B CORE ANTIBODY TOTAL: CPT | Performed by: INTERNAL MEDICINE

## 2020-09-28 PROCEDURE — 85025 COMPLETE CBC W/AUTO DIFF WBC: CPT | Performed by: INTERNAL MEDICINE

## 2020-09-28 PROCEDURE — 82565 ASSAY OF CREATININE: CPT | Performed by: INTERNAL MEDICINE

## 2020-09-28 PROCEDURE — 87389 HIV-1 AG W/HIV-1&-2 AB AG IA: CPT | Performed by: INTERNAL MEDICINE

## 2020-09-28 PROCEDURE — 86803 HEPATITIS C AB TEST: CPT | Performed by: INTERNAL MEDICINE

## 2020-09-28 PROCEDURE — 85652 RBC SED RATE AUTOMATED: CPT | Performed by: INTERNAL MEDICINE

## 2020-09-28 PROCEDURE — 82947 ASSAY GLUCOSE BLOOD QUANT: CPT | Performed by: INTERNAL MEDICINE

## 2020-09-28 PROCEDURE — 36415 COLL VENOUS BLD VENIPUNCTURE: CPT | Performed by: INTERNAL MEDICINE

## 2020-09-29 LAB
B BURGDOR IGG+IGM SER QL: 0.08 (ref 0–0.89)
HBV CORE AB SERPL QL IA: NONREACTIVE
HBV SURFACE AG SERPL QL IA: NONREACTIVE
HCV AB SERPL QL IA: NONREACTIVE
HIV 1+2 AB+HIV1 P24 AG SERPL QL IA: NONREACTIVE

## 2020-11-16 ENCOUNTER — HEALTH MAINTENANCE LETTER (OUTPATIENT)
Age: 35
End: 2020-11-16

## 2020-12-30 ENCOUNTER — TELEPHONE (OUTPATIENT)
Dept: RHEUMATOLOGY | Facility: CLINIC | Age: 35
End: 2020-12-30

## 2020-12-30 NOTE — TELEPHONE ENCOUNTER
Patient had to cancel her virtual follow up on 1/4/2020 with Dr Toussaint due to her work schedule. She would like to schedule a follow up, but Dr Toussaint has zero openings for follow ups or new patients anywhere that I can see. Please advise on how we can have this patient seen for a follow up?

## 2020-12-31 NOTE — TELEPHONE ENCOUNTER
Called patient and left a message to return call to clinic.  RN placed alternative apt time on hold for patient on 1/6/21 at 10 AM.    Jayce Garcia RN....12/31/2020 9:48 AM

## 2021-01-06 ENCOUNTER — VIRTUAL VISIT (OUTPATIENT)
Dept: RHEUMATOLOGY | Facility: CLINIC | Age: 36
End: 2021-01-06
Payer: COMMERCIAL

## 2021-01-06 DIAGNOSIS — Z79.899 HIGH RISK MEDICATION USE: ICD-10-CM

## 2021-01-06 DIAGNOSIS — M05.9 SEROPOSITIVE RHEUMATOID ARTHRITIS (H): Primary | ICD-10-CM

## 2021-01-06 PROCEDURE — 99214 OFFICE O/P EST MOD 30 MIN: CPT | Mod: 95 | Performed by: INTERNAL MEDICINE

## 2021-01-06 RX ORDER — PREDNISONE 2.5 MG/1
2.5 TABLET ORAL DAILY
Qty: 90 TABLET | Refills: 1 | Status: SHIPPED | OUTPATIENT
Start: 2021-01-06 | End: 2021-04-14

## 2021-01-06 RX ORDER — SULFASALAZINE 500 MG/1
1000 TABLET, DELAYED RELEASE ORAL 2 TIMES DAILY
Qty: 360 TABLET | Refills: 1 | Status: SHIPPED | OUTPATIENT
Start: 2021-01-06 | End: 2021-04-14

## 2021-01-06 NOTE — PATIENT INSTRUCTIONS
RHEUMATOLOGY    Dr. Toy Toussaint    [facility]  [address] [place] [address]    Our new phone number for Rheumatology is [phone], this number will be able to help you schedule appointments for Dr. Toussaint or if you have any message you would like sent to us.    Thank you for choosing [facility]!  Sheyla Stover [facility] Rheumatology

## 2021-01-06 NOTE — PROGRESS NOTES
Geovanna Schwartz is a 35 year old female who is being evaluated via a billable video visit.      How would you like to obtain your AVS? MyChart  If the video visit is dropped, the invitation should be resent by: [phone]  Will anyone else be joining your video visit? No        Rheumatology Video Visit      Geovanna Schwartz MRN# [id number]   YOB: 1985 Age: 35 year old      Date of visit: 1/06/21   PCP: Dr. Vicky Hu at [facility] ([facility])    Chief Complaint   Patient presents with:  Arthritis: RA.    Assessment and Plan     1. Seropositive Nonerosive Rheumatoid Arthritis (.3, .8): Dx'd 2017.  Established care with me on 9/24/2020, at which time she had swelling of the bilateral second MCPs and wrists, and was on prednisone 2.5 mg daily.  Previously on HCQ (GI upset). Currently on prednisone 2.5mg daily and sulfasalazine 1000 mg twice daily.  Improved with the addition of sulfasalazine but is still having active inflammatory symptoms.  We discussed additional treatment options.  We discussed her pregnancy plans and currently she has an IUD but would like to discuss with her  about the timeline of a potential pregnancy of which she is still not certain that they want additional children.  We discussed methotrexate and Humira.  If no pregnancy is planned within the next 1 year then plan to start methotrexate with an up titration to 20 mg once weekly.  If pregnancy is planned within the next 1 year then will start Humira.  Chronic illness with progression. Recent labs reviewed and independent interpretation of these tests reveal no evidence of medication toxicity but these are overdue for updating.  Additional labs were ordered for HIGH RISK MEDICATION TOXICITY MONITORING and disease activity evaluation.    - Continue sulfasalazine 1000 mg twice daily  (to be stopped if pregnancy planned for near future)  - Continue prednisone 2.5mg daily  - Start either  Humira or methotrexate (goal dose of 20mg once weekly) depending on lab results and ultimate pregnancy planning decisions that she is going to discuss with her  and inform me of their decision; Humira if pregnancy is planned within the next 1 year, methotrexate if pregnancy is not going to be planned within the next 1 year  - Labs now: CBC, Creatinine, Hepatic Panel, ESR, CRP, glucose, QuantiFERON-TB gold plus  - Chest x-ray now    # Pregnancy planning: IUD; she verbalized understanding that DMARDs will need adjustment at least 3 months prior to conception    # Methotrexate Risks and Benefits: The risks and benefits of methotrexate were discussed in detail and the patient verbalized understanding.  The risks discussed include, but are not limited to, the risk for hypersensitivity, anaphylaxis, anaphylactoid reactions, infections, bone marrow suppression, renal toxicity, hepatotoxicity, pulmonary toxicity, malignancy, impaired fertility, GI upset, alopecia, and oral and nasal sores.  Folic acid supplementation is recommended during methotrexate therapy to help prevent some of the side effects. Pregnancy prevention and planning was discussed; it is recommended that women of childbearing potential use reliable contraception during therapy.  The risks of taking both methotrexate and alcohol were reviewed; complete alcohol avoidance was discussed.  Routine laboratory monitoring is required during methotrexate therapy. Taking MTX once weekly, all within a 24 hour period was stressed and the patient verbalized this instruction back to me.  I encouraged reviewing the package insert and asking any questions about the medication.    # Adalimumab (Humira) Risks and Benefits: The risks and benefits of adalimumab were discussed in detail and the patient verbalized understanding.  The risks discussed include, but are not limited to, the risk for hypersensitivity, anaphylaxis, anaphylactoid reactions, an increased risk for  serious infections leading to hospitalization or death, a possible increased risk for lymphoma and other malignancies, a possible worsening of demyelinating diseases, a possible worsening of heart failure, risk for cytopenias, risk for drug induced lupus, possible reactivation of hepatitis B, and possible reactivation of latent tuberculosis.  Subcutaneous injections may result in injection site reactions and/or pain at the site of injection.  The most common adverse reactions are infections, injection site reactions, headache, and rash.  It was discussed that the medication would need to be discontinued if a serious infection develops.  It was discussed that live vaccinations should not be received while using adalimumab or within 30 days prior to starting adalimumab.  I encouraged reviewing the package insert and asking any questions about the medication.      2. Low vitamin D: low in the past and responded to supplemental vitamin D. Not taking vitamin D supplements at this time.  - Continue vitamin D 1000 IU daily  - Lab now: vitamin D, Calcium    3. Cigarette Smoking: advised complete cessation.      4.  Vaccinations: Vaccinations reviewed with Ms. Schwartz.  Risks and benefits of vaccinations were discussed.   I explained that Shingrix is used off label when under 50 years old and that the safety and efficacy of the vaccine has not been tested in people younger than 50 years old.   - Influenza: encouraged yearly vaccination  - [id number]: advised receiving  - [id number]: to receive at least 8 weeks after [id number] is administered  - Shingrix: Advised receiving; patient is going to check on insurance coverage before determining if it is going to be received   - COVID-19 vaccine discussed    # Relevant labs and imaging were reviewed with the patient    # High risk medication toxicity monitoring: discussion and labs reviewed; appropriate labs ordered. See above.  Instructed that if confirmed to have COVID-19 or  exposure to someone with confirmed COVID-19 to call this clinic for directions on DMARD management.    # Considered to be at high risk of complications from the COVID-19 virus.  It is recommended to limit contact with other people and if possible to work remotely or provide a leave of absence to reduce the risk for COVID-19.      Total minutes spent in evaluation with patient, review of pertinent lab tests and chart notes, and documentation: 30      Ms. Schwartz verbalized agreement with and understanding of the rational for the diagnosis and treatment plan.  All questions were answered to best of my ability and the patient's satisfaction. Ms. Schwartz was advised to contact the clinic with any questions that may arise after the clinic visit.      Thank you for involving me in the care of the patient    Return to clinic: 3 - 4 months      HPI   Geovanna Schwartz is a 35 year old female with a past medical history significant for allergic rhinitis, tobacco use, and rheumatoid arthritis who is seen for follow-up of rheumatoid arthritis.    6/7/2019 [facility] clinic note by Dr. Wade Weiner document seropositive rheumatoid arthritis on prednisone 2.5 mg daily and sulfasalazine 1000 mg twice daily    9/24/2020: , Ms. Schwartz reports that she has RA.  On prednisone 2.5mg daily now and it helps.  Previously on SSZ 1000mg daily when she was following with [facility] rheumatology and this was effective but not completely controlling symptoms so she was going to increase to BID but then lost to follow-up due to insurance change. Wrists and 2nd MCPs are the most affected joints; intermittent pain and stiffness in these joints; worse in the AM.  If very swollen in these joints then she doesn't want to move them at all, but when able to move without much pain then she feels better and better with increased physical activity. Felt better when on SSZ but not complete control when on SSZ once daily; so she had just  started SSZ BID dosing before changing insurance and then not having rheumatology follow-up .  HCQ was used without much improvement and she had GI upset with it; started 3 years ago when first dx'd and breastfeeding.  Currently not planning to have additional children. Birth control with IUD.   Morning stiffness for >45 min.  Was on vitamin D but has stopped.     Today, 1/6/2021: Sulfasalazine has been beneficial but she still has active joint symptoms primarily in her wrists and second MCPs.  She states that her symptoms are much more tolerable and she has more better days than she had in the past but she still has these active symptoms.  Pregnancy plans are not yet determined with her  so she is going to have this conversation with him soon.    Denies fevers, chills, nausea, vomiting, constipation, diarrhea. No abdominal pain. No chest pain/pressure, palpitations, or shortness of breath. No LE swelling. No neck pain. No oral or nasal sores.  No rash. No sicca symptoms.  No history of DVT or pulmonary embolism; one 1st trimester miscarriage.  No history of serositis.  No hist blood ory of Raynaud's Phenomenon.     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  CV: No chest pain, pressure, palpitations, or dyspnea on exertion.  PULM: No SOB, wheeze, cough.  GI: No nausea, vomiting, constipation, diarrhea. No abdominal pain.  MSK: See HPI.  NEURO: No numbness, tingling, or weakness.  EXT: No LE swelling  PSYCH: Negative    Active Problem List     Patient Active Problem List   Diagnosis     Personal history of contraception, presenting hazards to health     Allergic rhinitis due to other allergen     Viral warts     Tobacco use disorder     Other acne     Female genital symptoms     Adjustment disorder with anxiety      CARDIOVASCULAR SCREENING; LDL GOAL LESS THAN 160     Past Medical History     Past Medical History:   Diagnosis Date     Depressive disorder, not elsewhere classified      PAP SMEAR CERVIX W LGSIL 12/29/2006    Colpo HPV 6 and 16 DNA.-   RUTHANN 1 on of her cervical bx.   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 This is a more severe abnormality than indicated by the previous biopsies. I would recommend Pap smears as outlined. Please call. Alejandro Peter M.D. She will need follow-up paps at 4,8,12 months. If these are n     Past Surgical History     Past Surgical History:   Procedure Laterality Date     LEEP TX, CERVICAL  2/20/2007    RUTHANN I-II     SURGICAL HISTORY OF -   6/22/2004    Left wrist arthroscopy plus triangular      Allergy     Allergies   Allergen Reactions     Hydroxychloroquine Other (See Comments)     Stomach upset, diarrhea     Current Medication List     Current Outpatient Medications   Medication Sig     CELEXA 10 MG OR TABS 30 mg as of 9/23/2020      MG OR TABS 1 TABLET 3 TIMES DAILY     predniSONE (DELTASONE) 2.5 MG tablet Take 1 tablet (2.5 mg) by mouth daily     sulfaSALAzine ER (AZULFIDINE EN) 500 MG EC tablet 500mg BID for 7 days, then increase to 1000mg BID and continue 1000mg BID thereafter.     BUPROPION HCL# 300 MG OR TB24 1 TABLET DAILY (Patient not taking: No sig reported)     LORATADINE 10 MG OR TABS ONE DAILY (Patient not taking: No sig reported)     predniSONE (DELTASONE) 5 MG tablet Take 1 tablet (5 mg) by mouth daily Prednisone 10mg daily [id number], then 5mg daily [id number], then resume baseline 2.5mg daily.     SPRINTEC 28 0.25-35 MG-MCG OR TABS 1 TABLET DAILY (Patient not taking: No sig reported)     No current facility-administered medications for this visit.          Social History   See HPI    Family History     Family History   Problem Relation Age of Onset     Hypertension Father   "    Allergies Father      Allergies Brother      Allergies Sister      Physical Exam     Temp Readings from Last 3 Encounters:   04/17/09 97.8  F (36.6  C) (Tympanic)   12/13/05 98.7  F (37.1  C) (Oral)   11/14/05 98.5  F (36.9  C) (Oral)     BP Readings from Last 5 Encounters:   04/17/09 110/60   12/17/08 130/68   03/27/08 118/78   01/10/08 120/74   11/12/07 118/72     Pulse Readings from Last 1 Encounters:   04/17/09 88     Resp Readings from Last 1 Encounters:   No data found for Resp     Estimated body mass index is 22.76 kg/m  as calculated from the following:    Height as of 4/17/09: 1.676 m (5' 6\").    Weight as of 4/17/09: 64 kg (141 lb).    GEN: NAD. Healthy appearing adult.   HEENT: MMM.  Anicteric, noninjected sclera. No obvious external lesions of the ear and nose. Hearing intact.  PULM: No increased work of breathing  MSK:  Swelling of the bilateral 2nd MCPs and wrists.  PIPs and DIPs okay.    SKIN: No rash or jaundice seen  PSYCH: Alert. Appropriate.     Labs / Imaging (select studies)     CBC  Recent Labs   Lab Test 09/28/20  1332   WBC 7.9   RBC 4.58   HGB 14.3   HCT 41.8   MCV 91   RDW 11.6      MCH 31.2   MCHC 34.2   NEUTROPHIL 58.6   LYMPH 27.4   MONOCYTE 11.6   EOSINOPHIL 1.9   BASOPHIL 0.5   ANEU 4.6   ALYM 2.2   MARY 0.9   AEOS 0.2   ABAS 0.0     CMP  Recent Labs   Lab Test 09/28/20  1332 06/17/19 11/14/18 04/05/18   GLC 83  --   --   --    CR 0.79 0.78  --  0.91   GFRESTIMATED >90 >60  --  >60   GFRESTBLACK >90 >60  --  >60   BILITOTAL 0.4  --   --   --    ALBUMIN 3.8  --   --   --    PROTTOTAL 7.5  --   --   --    ALKPHOS 68  --   --   --    AST 19 16  --   --    ALT 18 10 15 14     ESR/CRP  Recent Labs   Lab Test 09/28/20  1332   SED 4   CRP 6.8     Hepatitis B  Recent Labs   Lab Test 09/28/20  1332   HBCAB Nonreactive   HEPBANG Nonreactive     Hepatitis C  Recent Labs   Lab Test 09/28/20  1332   HCVAB Nonreactive     Lyme ab screening  Recent Labs   Lab Test 09/28/20  1332   LYMEGM " 0.08     HIV Screening  Recent Labs   Lab Test 09/28/20  1332   HIAGAB Nonreactive       [facility] reviewed in CareEverywhere:  11/27/2017: .3, .8  1/4/2018: HCV ab negative, SABRINA negative    Immunization History     Immunization History   Administered Date(s) Administered     HPV 01/09/2007, 03/20/2007, 03/27/2008          Chart documentation done in part with Dragon Voice recognition Software. Although reviewed after completion, some word and grammatical error may remain.      Video-Visit Details    Type of service:  Video Visit    Video Start Time: 10:00 AM    Video End Time: 10:17 AM    Originating Location (pt. Location): [place], [place]    Distant Location (provider location):  Home    Platform used for Video Visit: Suresh

## 2021-01-12 DIAGNOSIS — R79.89 LOW VITAMIN D LEVEL: ICD-10-CM

## 2021-01-12 DIAGNOSIS — Z79.899 HIGH RISK MEDICATION USE: ICD-10-CM

## 2021-01-12 DIAGNOSIS — M05.9 SEROPOSITIVE RHEUMATOID ARTHRITIS (H): ICD-10-CM

## 2021-01-12 LAB
ALBUMIN SERPL-MCNC: 4.2 G/DL (ref 3.4–5)
ALP SERPL-CCNC: 56 U/L (ref 40–150)
ALT SERPL W P-5'-P-CCNC: 15 U/L (ref 0–50)
AST SERPL W P-5'-P-CCNC: 14 U/L (ref 0–45)
BASOPHILS # BLD AUTO: 0 10E9/L (ref 0–0.2)
BASOPHILS NFR BLD AUTO: 0 %
BILIRUB DIRECT SERPL-MCNC: 0.2 MG/DL (ref 0–0.2)
BILIRUB SERPL-MCNC: 0.6 MG/DL (ref 0.2–1.3)
CALCIUM SERPL-MCNC: 9.1 MG/DL (ref 8.5–10.1)
CREAT SERPL-MCNC: 0.77 MG/DL (ref 0.52–1.04)
CRP SERPL-MCNC: <2.9 MG/L (ref 0–8)
DEPRECATED CALCIDIOL+CALCIFEROL SERPL-MC: 38 UG/L (ref 20–75)
DIFFERENTIAL METHOD BLD: NORMAL
EOSINOPHIL # BLD AUTO: 0 10E9/L (ref 0–0.7)
EOSINOPHIL NFR BLD AUTO: 0 %
ERYTHROCYTE [DISTWIDTH] IN BLOOD BY AUTOMATED COUNT: 11.4 % (ref 10–15)
ERYTHROCYTE [SEDIMENTATION RATE] IN BLOOD BY WESTERGREN METHOD: 5 MM/H (ref 0–20)
GFR SERPL CREATININE-BSD FRML MDRD: >90 ML/MIN/{1.73_M2}
GLUCOSE SERPL-MCNC: 88 MG/DL (ref 70–99)
HCT VFR BLD AUTO: 42.7 % (ref 35–47)
HGB BLD-MCNC: 14.4 G/DL (ref 11.7–15.7)
LYMPHOCYTES # BLD AUTO: 2.2 10E9/L (ref 0.8–5.3)
LYMPHOCYTES NFR BLD AUTO: 26.3 %
MCH RBC QN AUTO: 31.6 PG (ref 26.5–33)
MCHC RBC AUTO-ENTMCNC: 33.7 G/DL (ref 31.5–36.5)
MCV RBC AUTO: 94 FL (ref 78–100)
MONOCYTES # BLD AUTO: 0.9 10E9/L (ref 0–1.3)
MONOCYTES NFR BLD AUTO: 10.7 %
NEUTROPHILS # BLD AUTO: 5.4 10E9/L (ref 1.6–8.3)
NEUTROPHILS NFR BLD AUTO: 63 %
PLATELET # BLD AUTO: 205 10E9/L (ref 150–450)
PROT SERPL-MCNC: 8.2 G/DL (ref 6.8–8.8)
RBC # BLD AUTO: 4.55 10E12/L (ref 3.8–5.2)
WBC # BLD AUTO: 8.5 10E9/L (ref 4–11)

## 2021-01-12 PROCEDURE — 82947 ASSAY GLUCOSE BLOOD QUANT: CPT | Performed by: INTERNAL MEDICINE

## 2021-01-12 PROCEDURE — 85652 RBC SED RATE AUTOMATED: CPT | Performed by: INTERNAL MEDICINE

## 2021-01-12 PROCEDURE — 82565 ASSAY OF CREATININE: CPT | Performed by: INTERNAL MEDICINE

## 2021-01-12 PROCEDURE — 86481 TB AG RESPONSE T-CELL SUSP: CPT | Performed by: INTERNAL MEDICINE

## 2021-01-12 PROCEDURE — 82306 VITAMIN D 25 HYDROXY: CPT | Performed by: INTERNAL MEDICINE

## 2021-01-12 PROCEDURE — 82310 ASSAY OF CALCIUM: CPT | Performed by: INTERNAL MEDICINE

## 2021-01-12 PROCEDURE — 86140 C-REACTIVE PROTEIN: CPT | Performed by: INTERNAL MEDICINE

## 2021-01-12 PROCEDURE — 36415 COLL VENOUS BLD VENIPUNCTURE: CPT | Performed by: INTERNAL MEDICINE

## 2021-01-12 PROCEDURE — 80076 HEPATIC FUNCTION PANEL: CPT | Performed by: INTERNAL MEDICINE

## 2021-01-12 PROCEDURE — 85025 COMPLETE CBC W/AUTO DIFF WBC: CPT | Performed by: INTERNAL MEDICINE

## 2021-01-14 LAB
GAMMA INTERFERON BACKGROUND BLD IA-ACNC: 0.07 IU/ML
M TB IFN-G CD4+ BCKGRND COR BLD-ACNC: 9.93 IU/ML
M TB TUBERC IFN-G BLD QL: POSITIVE
MITOGEN IGNF BCKGRD COR BLD-ACNC: 0.22 IU/ML
MITOGEN IGNF BCKGRD COR BLD-ACNC: 0.37 IU/ML

## 2021-01-15 ENCOUNTER — TELEPHONE (OUTPATIENT)
Dept: RHEUMATOLOGY | Facility: CLINIC | Age: 36
End: 2021-01-15

## 2021-01-15 DIAGNOSIS — R76.12 POSITIVE QUANTIFERON-TB GOLD TEST: Primary | ICD-10-CM

## 2021-01-15 NOTE — TELEPHONE ENCOUNTER
----- Message from Toy Toussaint MD sent at 1/15/2021  3:58 PM CST -----  RN: Please call to notify Geovanna Schwartz that labs are normal except for a positive tuberculosis test.  Because of this positive tuberculosis test she is being referred to the infectious disease clinic for evaluation.  There is a chest x-ray that is pending and this should be done to assess for evidence of pulmonary tuberculosis.  Please provide her with the phone number for the infectious disease clinic so that she may call to schedule (referral in chart).    Toy Toussaint MD  1/15/2021 3:56 PM

## 2021-01-15 NOTE — TELEPHONE ENCOUNTER
Left message for patient to return call to clinic.  Also sent patient a Thooft message relaying results.    Jayce Garcia RN....1/15/2021 4:02 PM

## 2021-01-15 NOTE — RESULT ENCOUNTER NOTE
RN: Please call to notify Geovanna Schwartz that labs are normal except for a positive tuberculosis test.  Because of this positive tuberculosis test she is being referred to the infectious disease clinic for evaluation.  There is a chest x-ray that is pending and this should be done to assess for evidence of pulmonary tuberculosis.  Please provide her with the phone number for the infectious disease clinic so that she may call to schedule (referral in chart).    Toy Toussaint MD  1/15/2021 3:56 PM

## 2021-01-15 NOTE — TELEPHONE ENCOUNTER
Patient returned the call and was informed of results and recommendations.  She verbalized understanding and will call to schedule the chest x-ray and consult with ID.    Jayce Garcia RN....1/15/2021 4:17 PM

## 2021-01-19 NOTE — TELEPHONE ENCOUNTER
RECORDS RECEIVED FROM: Internal   DATE RECEIVED: 01.27.2021   NOTES (Gather within 2 years) STATUS DETAILS   OFFICE NOTE from referring provider   Internal 01.15.2021 Toy Toussaint MD   OFFICE NOTE from other specialist N/A    DISCHARGE SUMMARY from hospital Received    DISCHARGE REPORT from the ER N/A    LABS (any labs) Internal    MEDICATION LIST Internal    IMAGING  (NEED IMAGES AND REPORTS)     Osteomyelitis: Foot imaging  N/A    Liver Abscess: Abdominal imaging N/A    Other (anything related to diagnoses N/A

## 2021-01-21 ENCOUNTER — HOSPITAL ENCOUNTER (OUTPATIENT)
Dept: GENERAL RADIOLOGY | Facility: CLINIC | Age: 36
Discharge: HOME | End: 2021-01-21
Attending: INTERNAL MEDICINE | Admitting: INTERNAL MEDICINE
Payer: COMMERCIAL

## 2021-01-21 PROCEDURE — 71046 X-RAY EXAM CHEST 2 VIEWS: CPT

## 2021-01-27 ENCOUNTER — VIRTUAL VISIT (OUTPATIENT)
Dept: INFECTIOUS DISEASES | Facility: CLINIC | Age: 36
End: 2021-01-27
Attending: INTERNAL MEDICINE
Payer: COMMERCIAL

## 2021-01-27 ENCOUNTER — PRE VISIT (OUTPATIENT)
Dept: INFECTIOUS DISEASES | Facility: CLINIC | Age: 36
End: 2021-01-27

## 2021-01-27 DIAGNOSIS — Z91.89: ICD-10-CM

## 2021-01-27 DIAGNOSIS — Z91.89 AT RISK FOR INFECTION DUE TO IMMUNOSUPPRESSION: ICD-10-CM

## 2021-01-27 DIAGNOSIS — R76.12 POSITIVE QUANTIFERON-TB GOLD TEST: ICD-10-CM

## 2021-01-27 DIAGNOSIS — M06.9 RHEUMATOID ARTHRITIS, INVOLVING UNSPECIFIED SITE, UNSPECIFIED WHETHER RHEUMATOID FACTOR PRESENT (H): Primary | ICD-10-CM

## 2021-01-27 PROCEDURE — 99203 OFFICE O/P NEW LOW 30 MIN: CPT | Mod: 95 | Performed by: STUDENT IN AN ORGANIZED HEALTH CARE EDUCATION/TRAINING PROGRAM

## 2021-01-27 ASSESSMENT — PAIN SCALES - GENERAL: PAINLEVEL: NO PAIN (0)

## 2021-01-27 NOTE — PROGRESS NOTES
[facility]  New Patient Visit  1/27/2021     Chief Complaint:  Positive TB IGRA    HPI:  Geovanna Schwartz is a 35 year old female previously healthy but recently diagnosed with rheumatoid arthritis. She is seen in consultation at the ID clinic for concern of a positive TB IGRA obtained during screening before starting a biologic for her RA.    She has no history of pulmonary TB. She has no known direct exposures to to a TB infected person. She works in ER as a nurse. She did last have a negative test in 2018.     For her RA, she is currently on 2.5mg prednisone alone. She currently denies fevers, weight loss, night sweats, lymphadenopathy, cough, hemoptysis. Her CXR on 1/21/21 was normal.      Other risks:  - No overseas travel.  - No homeless shelters/retirement work  - No family members with active TB in past        ROS: Complete 10-point ROS is negative except as noted above.    Past Medical History:  Past Medical History:   Diagnosis Date     Depressive disorder, not elsewhere classified      PAP SMEAR CERVIX W LGSIL 12/29/2006    Colpo HPV 6 and 16 DNA.-   RUTHANN 1 on of her cervical bx.   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 This is a more severe abnormality than indicated by the previous biopsies. I would recommend Pap smears as outlined. Please call. Alejandro Peter M.D. She will need follow-up paps at 4,8,12 months. If these are n       Past Surgical History:  Past Surgical History:   Procedure Laterality Date     LEEP TX, CERVICAL  2/20/2007    RUTHANN I-II     SURGICAL HISTORY OF -   6/22/2004    Left wrist arthroscopy plus triangular        Social History:  Social History     Socioeconomic History     Marital status: Single     Spouse name: Not on file     Number of children: Not on file     Years of education: Not on file     Highest education level: Not on file   Occupational History     Not on file    Social Needs     Financial resource strain: Not on file     Food insecurity     Worry: Not on file     Inability: Not on file     Transportation needs     Medical: Not on file     Non-medical: Not on file   Tobacco Use     Smoking status: Current Some Day Smoker     Years: 2.00     Types: Cigarettes     Smokeless tobacco: Never Used     Tobacco comment: smokes when she drinks   Substance and Sexual Activity     Alcohol use: Yes     Comment: occasional     Drug use: No     Sexual activity: Yes     Birth control/protection: Condom, Pill   Lifestyle     Physical activity     Days per week: Not on file     Minutes per session: Not on file     Stress: Not on file   Relationships     Social connections     Talks on phone: Not on file     Gets together: Not on file     Attends Confucianism service: Not on file     Active member of club or organization: Not on file     Attends meetings of clubs or organizations: Not on file     Relationship status: Not on file     Intimate partner violence     Fear of current or ex partner: Not on file     Emotionally abused: Not on file     Physically abused: Not on file     Forced sexual activity: Not on file   Other Topics Concern     Not on file   Social History Narrative     Not on file       Family Medical History:  Family History   Problem Relation Age of Onset     Hypertension Father      Allergies Father      Allergies Brother      Allergies Sister        Allergies:     Allergies   Allergen Reactions     Hydroxychloroquine Other (See Comments)     Stomach upset, diarrhea       Medications:  Current Outpatient Medications   Medication Sig Dispense Refill     CELEXA 10 MG OR TABS 30 mg as of 9/23/2020 30 1      MG OR TABS Take by mouth 3 times daily as needed        predniSONE (DELTASONE) 2.5 MG tablet Take 1 tablet (2.5 mg) by mouth daily 90 tablet 1     BUPROPION HCL# 300 MG OR TB24 1 TABLET DAILY (Patient not taking: No sig reported) 35 1 year     LORATADINE 10 MG OR TABS  ONE DAILY (Patient not taking: No sig reported) 30 1 YEAR     SPRINTEC 28 0.25-35 MG-MCG OR TABS 1 TABLET DAILY (Patient not taking: No sig reported) 28 1 year     sulfaSALAzine ER (AZULFIDINE EN) 500 MG EC tablet Take 2 tablets (1,000 mg) by mouth 2 times daily (Patient not taking: Reported on 1/27/2021) 360 tablet 1       Immunizations:  Immunization History   Administered Date(s) Administered     HPV 01/09/2007, 03/20/2007, 03/27/2008       Exam: Video Visit  Gen: Alert and awake. Communicative. No distress.  Psych: Normal affect. Alert and oriented.   HEENT: NC/AT, EOMI  Chest: Normal effort, no audible wheezing.   Extremities: Warm and well perfused.   Skin: No rashes or lesions noted.     Labs:  WBC   Date Value Ref Range Status   01/12/2021 8.5 4.0 - 11.0 10e9/L Final       CRP Inflammation   Date Value Ref Range Status   01/12/2021 <2.9 0.0 - 8.0 mg/L Final   09/28/2020 6.8 0.0 - 8.0 mg/L Final       Creatinine   Date Value Ref Range Status   01/12/2021 0.77 0.52 - 1.04 mg/dL Final   09/28/2020 0.79 0.52 - 1.04 mg/dL Final   06/17/2019 0.78 0.57 - 1.11 mg/dL Final      MTB Quantiferon Result NEG^Negative PositiveAbnormal         TB1 Ag minus Nil IU/mL 0.22     TB2 Ag minus Nil IU/mL 0.37     Mitogen minus Nil IU/mL 9.93     NIL Result IU/mL 0.07       Specimen Collected: 01/12/21  1:50 PM Last Resulted: 01/14/21 10:44 AM          Assessment and Plan:  Geovanna Schwartz is a 35 year old female with rheumatoid arthritis who underwent TB IGRA screening before starting a biologic and resulted as positive. She has no clear risk for TB (aside from being an ER nurse) and has no signs or symptoms of TB, including a negative CXR. Further, her TB1 and TB2 antigen responses were quite weak (0.22 and 0.37) compared to her positive control against her negative control. She is only on 2.5mg of prednisone so I don't suspect that is significantly blunting the response.     At this time, I think she is at low risk for  latent TB and would prefer to just retest her next week. If she is negative on the second test I would move forward with biologic therapy without concern. If she is again low positive, then a discussion is reasonable about the risks of reactivating TB while on the biologic therapy (if she actually does have latent TB) versus starting treatment for latent TB (she would have to wait for 1 month of therapy before starting the biologic). She certainly has no evidence of active TB at this time.     Plan:  - Repeat TB IGRA next week  - Further plan pending result as discussed above    Fransisco Hansen MD  Infectious Diseases Fellow  [phone]    Return to clinic prn, to be determined after repeat test.

## 2021-01-27 NOTE — PROGRESS NOTES
Geovanna is a 35 year old who is being evaluated via a billable video visit.      How would you like to obtain your AVS? MyChart  If the video visit is dropped, the invitation should be resent by: Text to cell phone: [phone]  Will anyone else be joining your video visit? No      Video Start Time: 1325  Video-Visit Details    Type of service:  Video Visit    Video End Time:1341    Originating Location (pt. Location): Home    Distant Location (provider location):  [facility] [place]     Platform used for Video Visit: [employer]

## 2021-01-27 NOTE — LETTER
February 4, 2021       TO: Geovanna Schwartz  [address] [address] [place]  [place] [address]       DearMs.Lana,    We are writing to inform you of your test results.    {[facility] results letter list:[id number]}    No results found from the In Basket message.    ***

## 2021-01-27 NOTE — Clinical Note
1/27/2021       RE: Geovanna Schwartz  [address]  [place] [address]     Dear Colleague,    Thank you for referring your patient, Geovanna Schwratz, to the [facility] INFECTIOUS DISEASE CLINIC [place] [facility]. Please see a copy of my visit note below.    Geovanna is a 35 year old who is being evaluated via a billable video visit.      How would you like to obtain your AVS? MyChart  If the video visit is dropped, the invitation should be resent by: Text to cell phone: [phone]  Will anyone else be joining your video visit? No  {If patient encounters technical issues they should call [phone] :[id number]}    Video Start Time: 1325  Video-Visit Details    Type of service:  Video Visit    Video End Time:1341    Originating Location (pt. Location): Home    Distant Location (provider location):  [facility] INFECTIOUS DISEASE [facility]     Platform used for Video Visit: [facility]  New Patient Visit  1/27/2021     Chief Complaint:  Positive TB IGRA    HPI:  Geovanna Schwartz is a 35 year old female previously healthy but recently diagnosed with rheumatoid arthritis.     No history of pulmonary TB.     Works in ER as a nurse. Had a negative test in 2018.     Currently on 2.5mg prednisone.    No overseas travel.    No homeless shelters/[facility].    No family members with active TB.    CXR normal.     ROS: Complete 12-point ROS is negative except as noted above.    Past Medical History:  Past Medical History:   Diagnosis Date     Depressive disorder, not elsewhere classified      PAP SMEAR CERVIX W LGSIL 12/29/2006    Colpo HPV 6 and 16 DNA.-   RUTHANN 1 on of her cervical bx.   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 This is a more severe abnormality than indicated by the previous biopsies. I would recommend Pap smears as  outlined. Please call. Alejandro Peter M.D. She will need follow-up paps at 4,8,12 months. If these are n       Past Surgical History:  Past Surgical History:   Procedure Laterality Date     LEEP TX, CERVICAL  2/20/2007    RUTHANN I-II     SURGICAL HISTORY OF -   6/22/2004    Left wrist arthroscopy plus triangular        Social History:  Social History     Socioeconomic History     Marital status: Single     Spouse name: Not on file     Number of children: Not on file     Years of education: Not on file     Highest education level: Not on file   Occupational History     Not on file   Social Needs     Financial resource strain: Not on file     Food insecurity     Worry: Not on file     Inability: Not on file     Transportation needs     Medical: Not on file     Non-medical: Not on file   Tobacco Use     Smoking status: Current Some Day Smoker     Years: 2.00     Types: Cigarettes     Smokeless tobacco: Never Used     Tobacco comment: smokes when she drinks   Substance and Sexual Activity     Alcohol use: Yes     Comment: occasional     Drug use: No     Sexual activity: Yes     Birth control/protection: Condom, Pill   Lifestyle     Physical activity     Days per week: Not on file     Minutes per session: Not on file     Stress: Not on file   Relationships     Social connections     Talks on phone: Not on file     Gets together: Not on file     Attends Baptism service: Not on file     Active member of club or organization: Not on file     Attends meetings of clubs or organizations: Not on file     Relationship status: Not on file     Intimate partner violence     Fear of current or ex partner: Not on file     Emotionally abused: Not on file     Physically abused: Not on file     Forced sexual activity: Not on file   Other Topics Concern     Not on file   Social History Narrative     Not on file       Family Medical History:  Family History   Problem Relation Age of Onset     Hypertension Father      Allergies Father       Allergies Brother      Allergies Sister        Allergies:     Allergies   Allergen Reactions     Hydroxychloroquine Other (See Comments)     Stomach upset, diarrhea       Medications:  Current Outpatient Medications   Medication Sig Dispense Refill     CELEXA 10 MG OR TABS 30 mg as of 9/23/2020 30 1      MG OR TABS Take by mouth 3 times daily as needed        predniSONE (DELTASONE) 2.5 MG tablet Take 1 tablet (2.5 mg) by mouth daily 90 tablet 1     BUPROPION HCL# 300 MG OR TB24 1 TABLET DAILY (Patient not taking: No sig reported) 35 1 year     LORATADINE 10 MG OR TABS ONE DAILY (Patient not taking: No sig reported) 30 1 YEAR     SPRINTEC 28 0.25-35 MG-MCG OR TABS 1 TABLET DAILY (Patient not taking: No sig reported) 28 1 year     sulfaSALAzine ER (AZULFIDINE EN) 500 MG EC tablet Take 2 tablets (1,000 mg) by mouth 2 times daily (Patient not taking: Reported on 1/27/2021) 360 tablet 1       Immunizations:  Immunization History   Administered Date(s) Administered     HPV 01/09/2007, 03/20/2007, 03/27/2008       Exam:  B/P: Data Unavailable, T: Data Unavailable, P: Data Unavailable, R: Data Unavailable, Weight: 0 lbs 0 oz  Gen: Alert and in no distress.   Psych: Normal affect. Alert and oriented.   HEENT: PERRL. No icterus. Oropharynx pink and moist without lesions.   Neck: No lymphadenopathy.   CV: Regular rate and rhythm without m/r/g.   Chest: Clear to auscultation bilaterally without wheezes or crackles.   Abdomen: Soft, non-distended. Non-tender. Normal bowel sounds.   Extremities: Warm and well perfused.   Skin: No rashes or lesions noted.     Labs:  WBC   Date Value Ref Range Status   01/12/2021 8.5 4.0 - 11.0 10e9/L Final       CRP Inflammation   Date Value Ref Range Status   01/12/2021 <2.9 0.0 - 8.0 mg/L Final   09/28/2020 6.8 0.0 - 8.0 mg/L Final       Creatinine   Date Value Ref Range Status   01/12/2021 0.77 0.52 - 1.04 mg/dL Final   09/28/2020 0.79 0.52 - 1.04 mg/dL Final   06/17/2019 0.78 0.57 -  1.11 mg/dL Final       Assessment and Plan:  Geovanna Schwartz is a 35 year old female ***    Return to clinic ***          Again, thank you for allowing me to participate in the care of your patient.      Sincerely,    Fransisco Hansen MD

## 2021-02-03 NOTE — PATIENT INSTRUCTIONS
Alan Austin,    It was nice to meet you.     As we discussed, at this time I would just repeat the test, as I suspect it could have been a false positive result.    We can make further decisions pending the 2nd test result.    Nice to meet you and take care!    Fransisco

## 2021-02-05 NOTE — PROGRESS NOTES
Infectious Disease Clinic Staff Note: Ms. Schwartz had a Video Virtual Visit today and I participated in and discussed the case with Dr. Hansen, ID Fellow -- I agree with his consultative history, assessment and plan in this outpatient ID Virtual Consult note. This note reflects my observations and opinions and the plan outlined fully reflects my approach. I have reviewed the available history, radiology, laboratory results, and reports with the Fellow.

## 2021-02-23 DIAGNOSIS — M06.9 RHEUMATOID ARTHRITIS, INVOLVING UNSPECIFIED SITE, UNSPECIFIED WHETHER RHEUMATOID FACTOR PRESENT (H): ICD-10-CM

## 2021-02-23 PROCEDURE — 36415 COLL VENOUS BLD VENIPUNCTURE: CPT | Performed by: STUDENT IN AN ORGANIZED HEALTH CARE EDUCATION/TRAINING PROGRAM

## 2021-02-23 PROCEDURE — 86481 TB AG RESPONSE T-CELL SUSP: CPT | Performed by: STUDENT IN AN ORGANIZED HEALTH CARE EDUCATION/TRAINING PROGRAM

## 2021-02-25 LAB
GAMMA INTERFERON BACKGROUND BLD IA-ACNC: 0.07 IU/ML
M TB IFN-G CD4+ BCKGRND COR BLD-ACNC: 9.93 IU/ML
M TB TUBERC IFN-G BLD QL: NEGATIVE
MITOGEN IGNF BCKGRD COR BLD-ACNC: 0.09 IU/ML
MITOGEN IGNF BCKGRD COR BLD-ACNC: 0.18 IU/ML

## 2021-03-06 ENCOUNTER — COMMUNICATION - HEALTHEAST (OUTPATIENT)
Dept: FAMILY MEDICINE | Facility: CLINIC | Age: 36
End: 2021-03-06

## 2021-03-06 DIAGNOSIS — F43.22 ADJUSTMENT DISORDER WITH ANXIOUS MOOD: ICD-10-CM

## 2021-03-10 ENCOUNTER — COMMUNICATION - HEALTHEAST (OUTPATIENT)
Dept: FAMILY MEDICINE | Facility: CLINIC | Age: 36
End: 2021-03-10

## 2021-03-15 ENCOUNTER — OFFICE VISIT - HEALTHEAST (OUTPATIENT)
Dept: FAMILY MEDICINE | Facility: CLINIC | Age: 36
End: 2021-03-15

## 2021-03-15 DIAGNOSIS — M05.741 RHEUMATOID ARTHRITIS INVOLVING BOTH HANDS WITH POSITIVE RHEUMATOID FACTOR (H): ICD-10-CM

## 2021-03-15 DIAGNOSIS — M05.742 RHEUMATOID ARTHRITIS INVOLVING BOTH HANDS WITH POSITIVE RHEUMATOID FACTOR (H): ICD-10-CM

## 2021-03-15 DIAGNOSIS — F43.22 ADJUSTMENT DISORDER WITH ANXIOUS MOOD: ICD-10-CM

## 2021-03-15 ASSESSMENT — PATIENT HEALTH QUESTIONNAIRE - PHQ9: SUM OF ALL RESPONSES TO PHQ QUESTIONS 1-9: 1

## 2021-03-15 ASSESSMENT — ANXIETY QUESTIONNAIRES
IF YOU CHECKED OFF ANY PROBLEMS ON THIS QUESTIONNAIRE, HOW DIFFICULT HAVE THESE PROBLEMS MADE IT FOR YOU TO DO YOUR WORK, TAKE CARE OF THINGS AT HOME, OR GET ALONG WITH OTHER PEOPLE: NOT DIFFICULT AT ALL
3. WORRYING TOO MUCH ABOUT DIFFERENT THINGS: NOT AT ALL
2. NOT BEING ABLE TO STOP OR CONTROL WORRYING: NOT AT ALL
1. FEELING NERVOUS, ANXIOUS, OR ON EDGE: SEVERAL DAYS
GAD7 TOTAL SCORE: 2
6. BECOMING EASILY ANNOYED OR IRRITABLE: SEVERAL DAYS
4. TROUBLE RELAXING: NOT AT ALL
5. BEING SO RESTLESS THAT IT IS HARD TO SIT STILL: NOT AT ALL
7. FEELING AFRAID AS IF SOMETHING AWFUL MIGHT HAPPEN: NOT AT ALL

## 2021-04-14 ENCOUNTER — VIRTUAL VISIT (OUTPATIENT)
Dept: RHEUMATOLOGY | Facility: CLINIC | Age: 36
End: 2021-04-14
Payer: COMMERCIAL

## 2021-04-14 DIAGNOSIS — Z79.899 HIGH RISK MEDICATION USE: ICD-10-CM

## 2021-04-14 DIAGNOSIS — M05.9 SEROPOSITIVE RHEUMATOID ARTHRITIS (H): Primary | ICD-10-CM

## 2021-04-14 DIAGNOSIS — M75.42 IMPINGEMENT SYNDROME OF BOTH SHOULDERS: ICD-10-CM

## 2021-04-14 DIAGNOSIS — F17.200 TOBACCO USE DISORDER: ICD-10-CM

## 2021-04-14 DIAGNOSIS — M75.41 IMPINGEMENT SYNDROME OF BOTH SHOULDERS: ICD-10-CM

## 2021-04-14 PROCEDURE — 99214 OFFICE O/P EST MOD 30 MIN: CPT | Mod: 95 | Performed by: INTERNAL MEDICINE

## 2021-04-14 RX ORDER — METHOTREXATE 2.5 MG/1
TABLET ORAL
Qty: 32 TABLET | Refills: 3 | Status: SHIPPED | OUTPATIENT
Start: 2021-04-14 | End: 2021-07-14

## 2021-04-14 RX ORDER — SULFASALAZINE 500 MG/1
1000 TABLET, DELAYED RELEASE ORAL 2 TIMES DAILY
Qty: 360 TABLET | Refills: 1 | Status: SHIPPED | OUTPATIENT
Start: 2021-04-14 | End: 2021-07-14

## 2021-04-14 RX ORDER — PREDNISONE 2.5 MG/1
2.5 TABLET ORAL DAILY
Qty: 90 TABLET | Refills: 2 | Status: SHIPPED | OUTPATIENT
Start: 2021-04-14 | End: 2021-11-16

## 2021-04-14 RX ORDER — FOLIC ACID 1 MG/1
1 TABLET ORAL DAILY
Qty: 90 TABLET | Refills: 3 | Status: SHIPPED | OUTPATIENT
Start: 2021-04-14 | End: 2022-03-08

## 2021-04-14 NOTE — PATIENT INSTRUCTIONS
RHEUMATOLOGY    Dr. Toy Toussaint    [facility]  [address] [place] [address]    Our new phone number for Rheumatology is [phone], this number will be able to help you schedule appointments for Dr. Toussaint or if you have any message you would like sent to us.    Thank you for choosing [facility]!    Sheyla Stover [facility] Rheumatology

## 2021-04-14 NOTE — PROGRESS NOTES
Geovanna Schwartz  is a 35 year old year old female who is being evaluated via a billable video visit.      How would you like to obtain your AVS? MyChart  If the video visit is dropped, the invitation should be resent by: Text to cell phone: [phone]  Will anyone else be joining your video visit? No    Rheumatology Video Visit      Geovanna Schwartz MRN# [id number]   YOB: 1985 Age: 35 year old      Date of visit: 4/14/21   PCP: Dr. Vicky Hu at [facility]    Chief Complaint   Patient presents with:  Arthritis: RA    Assessment and Plan     1. Seropositive Nonerosive Rheumatoid Arthritis (.3, .8): Dx'd 2017.  Established care with me on 9/24/2020, at which time she had swelling of the bilateral second MCPs and wrists, and was on prednisone 2.5 mg daily.  Previously on HCQ (GI upset). Currently on prednisone 2.5mg daily and sulfasalazine 1000 mg twice daily.  Improved with the addition of sulfasalazine but is still having active inflammatory symptoms, currently involving her second MCPs, wrists, shoulders, and MTPs.  We previously discussed treatment options and she was going to discuss with her  about pregnancy planning; they have decided that they are planning for no future conception and she will continue with her IUD.  Therefore, she would like to start methotrexate.  Chronic illness, progressive.    - Start Methotrexate 10mg once weekly for 1 week, then increase by 2.5mg each week until taking 20mg once weekly; then continue 20mg once weekly thereafter.  - Start folic acid 1mg daily  - Continue sulfasalazine 1000 mg twice daily    - Continue prednisone 2.5mg daily, with plans to reduce in the future   - Labs now: CBC, Creatinine, Hepatic Panel, ESR, CRP  - Labs monthly [id number]: CBC, Cr, Hepatic Panel  - Labs in 3 months: CBC, Creatinine, Hepatic Panel, ESR, CRP, glucose    High risk medication requiring intensive toxicity monitoring at least  quarterly: labs ordered include CBC, Creatinine, Hepatic panel to monitor for cytopenia and hepatotoxicity; checking creatinine as it affects clearance of medication.     # Pregnancy planning: IUD; she verbalized understanding that DMARDs will need adjustment at least 3 months prior to conception    # Methotrexate Risks and Benefits: The risks and benefits of methotrexate were discussed in detail and the patient verbalized understanding.  The risks discussed include, but are not limited to, the risk for hypersensitivity, anaphylaxis, anaphylactoid reactions, infections, bone marrow suppression, renal toxicity, hepatotoxicity, pulmonary toxicity, malignancy, impaired fertility, GI upset, alopecia, and oral and nasal sores.  Folic acid supplementation is recommended during methotrexate therapy to help prevent some of the side effects. Pregnancy prevention and planning was discussed; it is recommended that women of childbearing potential use reliable contraception during therapy.  The risks of taking both methotrexate and alcohol were reviewed; complete alcohol avoidance was discussed.  Routine laboratory monitoring is required during methotrexate therapy. Taking MTX once weekly, all within a 24 hour period was stressed and the patient verbalized this instruction back to me.  I encouraged reviewing the package insert and asking any questions about the medication.    2. Low vitamin D: low in the past and responded to supplemental vitamin D. Not taking vitamin D supplements at this time.  - Start vitamin D 1000 IU daily (to purchase OTC)    3. Cigarette Smoking: advised complete cessation.      4.  Impingement syndrome of both shoulders: Likely also in part due to rheumatoid arthritis that is being addressed in #1.  If still symptomatic and RA appears better controlled then may need physical therapy and/or consider steroid injection.    5.  Vaccinations: Vaccinations reviewed with Ms. Schwartz.  Risks and benefits of  vaccinations were discussed.   I explained that Shingrix is used off label when under 50 years old and that the safety and efficacy of the vaccine has not been tested in people younger than 50 years old.   - Influenza: encouraged yearly vaccination  - [id number]: advised receiving  - [id number]: to receive at least 8 weeks after [id number] is administered  - Shingrix: Advised receiving; patient is going to check on insurance coverage before determining if it is going to be received   - COVID-19: Status-post 2 doses of the Pfizer COVID-19 vaccine, received on 1/8/2021 and 1/28/2021    Total minutes spent in evaluation with patient, documentation, , and review of pertinent studies and chart notes: 23     Ms. Schwartz verbalized agreement with and understanding of the rational for the diagnosis and treatment plan.  All questions were answered to best of my ability and the patient's satisfaction. Ms. Schwartz was advised to contact the clinic with any questions that may arise after the clinic visit.      Thank you for involving me in the care of the patient    Return to clinic: 3 - 4 months      HPI   Geovanna Schwartz is a 35 year old female with a past medical history significant for allergic rhinitis, tobacco use, and rheumatoid arthritis who is seen for follow-up of rheumatoid arthritis.    6/7/2019 [facility]ology clinic note by Dr. Wade Weiner document seropositive rheumatoid arthritis on prednisone 2.5 mg daily and sulfasalazine 1000 mg twice daily    9/24/2020: , Ms. Schwartz reports that she has RA.  On prednisone 2.5mg daily now and it helps.  Previously on SSZ 1000mg daily when she was following with [facility] rheumatology and this was effective but not completely controlling symptoms so she was going to increase to BID but then lost to follow-up due to insurance change. Wrists and 2nd MCPs are the most affected joints; intermittent pain and stiffness in these joints; worse in the AM.  If  very swollen in these joints then she doesn't want to move them at all, but when able to move without much pain then she feels better and better with increased physical activity. Felt better when on SSZ but not complete control when on SSZ once daily; so she had just started SSZ BID dosing before changing insurance and then not having rheumatology follow-up .  HCQ was used without much improvement and she had GI upset with it; started 3 years ago when first dx'd and breastfeeding.  Currently not planning to have additional children. Birth control with IUD.   Morning stiffness for >45 min.  Was on vitamin D but has stopped.     1/6/2021: Sulfasalazine has been beneficial but she still has active joint symptoms primarily in her wrists and second MCPs.  She states that her symptoms are much more tolerable and she has more better days than she had in the past but she still has these active symptoms.  Pregnancy plans are not yet determined with her  so she is going to have this conversation with him soon.    Today, 4/14/2021: Pain in her shoulders if she raises her arms above her head, worse with abduction.  Wrists, second MCPs and MTPs are achy, worse in the morning improves time and activity.  Morning stiffness for approximately 30-45 minutes.  She has discussed conception planning with her  and they are planning for no more children so she will continue with the IUD.  She states that she would like to start methotrexate.    Denies fevers, chills, nausea, vomiting, constipation, diarrhea. No abdominal pain. No chest pain/pressure, palpitations, or shortness of breath. No LE swelling. No neck pain. No oral or nasal sores.  No rash. No sicca symptoms.  No history of DVT or pulmonary embolism; one 1st trimester miscarriage.  No history of serositis.  No hist blood ory of Raynaud's Phenomenon.     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[facility] [employer] [facility] [place]    ROS   12 point review of system was completed and negative except as noted in the HPI     Active Problem List     Patient Active Problem List   Diagnosis     Personal history of contraception, presenting hazards to health     Allergic rhinitis due to other allergen     Viral warts     Tobacco use disorder     Other acne     Female genital symptoms     Adjustment disorder with anxiety     CARDIOVASCULAR SCREENING; LDL GOAL LESS THAN 160     Past Medical History     Past Medical History:   Diagnosis Date     Depressive disorder, not elsewhere classified      PAP SMEAR CERVIX W LGSIL 12/29/2006    Colpo HPV 6 and 16 DNA.-   RUTHANN 1 on of her cervical bx.   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 This is a more severe abnormality than indicated by the previous biopsies. I would recommend Pap smears as outlined. Please call. Alejandro Peter M.D. She will need follow-up paps at 4,8,12 months. If these are n     Past Surgical History     Past Surgical History:   Procedure Laterality Date     LEEP TX, CERVICAL  2/20/2007    RUTHANN I-II     SURGICAL HISTORY OF -   6/22/2004    Left wrist arthroscopy plus triangular      Allergy     Allergies   Allergen Reactions     Hydroxychloroquine Other (See Comments)     Stomach upset, diarrhea     Current Medication List     Current Outpatient Medications   Medication Sig     CELEXA 10 MG OR TABS 30 mg as of 9/23/2020      MG OR TABS Take by mouth 3 times daily as needed      predniSONE (DELTASONE) 2.5 MG tablet Take 1 tablet (2.5 mg) by mouth daily     BUPROPION HCL# 300 MG OR TB24 1 TABLET DAILY (Patient not taking: No sig reported)     LORATADINE 10 MG OR TABS ONE DAILY (Patient not taking: No sig reported)     SPRINTEC 28 0.25-35 MG-MCG OR TABS 1 TABLET DAILY (Patient not taking: No sig reported)     sulfaSALAzine ER (AZULFIDINE EN) 500 MG EC tablet Take 2 tablets  "(1,000 mg) by mouth 2 times daily (Patient not taking: Reported on 1/27/2021)     No current facility-administered medications for this visit.          Social History   See HPI    Family History     Family History   Problem Relation Age of Onset     Hypertension Father      Allergies Father      Allergies Brother      Allergies Sister      Physical Exam     Temp Readings from Last 3 Encounters:   04/17/09 97.8  F (36.6  C) (Tympanic)   12/13/05 98.7  F (37.1  C) (Oral)   11/14/05 98.5  F (36.9  C) (Oral)     BP Readings from Last 5 Encounters:   04/17/09 110/60   12/17/08 130/68   03/27/08 118/78   01/10/08 120/74   11/12/07 118/72     Pulse Readings from Last 1 Encounters:   04/17/09 88     Resp Readings from Last 1 Encounters:   No data found for Resp     Estimated body mass index is 22.76 kg/m  as calculated from the following:    Height as of 4/17/09: 1.676 m (5' 6\").    Weight as of 4/17/09: 64 kg (141 lb).    GEN: NAD. Healthy appearing adult.   HEENT: MMM.  Anicteric, noninjected sclera. No obvious external lesions of the ear and nose. Hearing intact.  PULM: No increased work of breathing  MSK:  Swelling of the bilateral 2nd MCPs and wrists.  PIPs and DIPs okay.   Pain with abduction of her shoulders above 90 degrees.  SKIN: No rash or jaundice seen  PSYCH: Alert. Appropriate.     Labs / Imaging (select studies)     CBC  Recent Labs   Lab Test 01/12/21  1350 09/28/20  1332   WBC 8.5 7.9   RBC 4.55 4.58   HGB 14.4 14.3   HCT 42.7 41.8   MCV 94 91   RDW 11.4 11.6    198   MCH 31.6 31.2   MCHC 33.7 34.2   NEUTROPHIL 63.0 58.6   LYMPH 26.3 27.4   MONOCYTE 10.7 11.6   EOSINOPHIL 0.0 1.9   BASOPHIL 0.0 0.5   ANEU 5.4 4.6   ALYM 2.2 2.2   MARY 0.9 0.9   AEOS 0.0 0.2   ABAS 0.0 0.0     CMP  Recent Labs   Lab Test 01/12/21  1350 09/28/20  1332 06/17/19   GLC 88 83  --    CR 0.77 0.79 0.78   GFRESTIMATED >90 >90 >60   GFRESTBLACK >90 >90 >60   HA 9.1  --   --    BILITOTAL 0.6 0.4  --    ALBUMIN 4.2 3.8  --  "   PROTTOTAL 8.2 7.5  --    ALKPHOS 56 68  --    AST 14 19 16   ALT 15 18 10     Calcium/VitaminD  Recent Labs   Lab Test 01/12/21  1350   HA 9.1   VITDT 38     ESR/CRP  Recent Labs   Lab Test 01/12/21  1350 09/28/20  1332   SED 5 4   CRP <2.9 6.8       Hepatitis B  Recent Labs   Lab Test 09/28/20  1332   HBCAB Nonreactive   HEPBANG Nonreactive     Hepatitis C  Recent Labs   Lab Test 09/28/20  1332   HCVAB Nonreactive     Lyme ab screening  Recent Labs   Lab Test 09/28/20  1332   LYMEGM 0.08     Tuberculosis Screening  Recent Labs   Lab Test 02/23/21  1112 01/12/21  1350   TBRST Negative Positive*     HIV Screening  Recent Labs   Lab Test 09/28/20  1332   HIAGAB Nonreactive       [facility] Labs reviewed in CareEverywhere:  11/27/2017: .3, .8  1/4/2018: HCV ab negative, SABRINA negative    Immunization History     Immunization History   Administered Date(s) Administered     HPV 01/09/2007, 03/20/2007, 03/27/2008          Chart documentation done in part with Dragon Voice recognition Software. Although reviewed after completion, some word and grammatical error may remain.      Video-Visit Details    Type of service:  Video Visit    Video Start Time: 10:05 AM    Video End Time:10:20 AM    Originating Location (pt. Location): Home, [place]    Distant Location (provider location):  Home    Platform used for Video Visit: Chivo Toussaint MD

## 2021-05-27 ASSESSMENT — PATIENT HEALTH QUESTIONNAIRE - PHQ9: SUM OF ALL RESPONSES TO PHQ QUESTIONS 1-9: 1

## 2021-05-28 ASSESSMENT — ANXIETY QUESTIONNAIRES: GAD7 TOTAL SCORE: 2

## 2021-05-30 VITALS — WEIGHT: 153 LBS | BODY MASS INDEX: 24.32 KG/M2

## 2021-05-30 VITALS — WEIGHT: 150 LBS | BODY MASS INDEX: 24.11 KG/M2 | HEIGHT: 66 IN

## 2021-05-30 VITALS — WEIGHT: 148 LBS | BODY MASS INDEX: 23.53 KG/M2

## 2021-05-30 VITALS — WEIGHT: 150 LBS | BODY MASS INDEX: 23.54 KG/M2 | HEIGHT: 67 IN

## 2021-05-31 VITALS — HEIGHT: 67 IN | WEIGHT: 150 LBS | BODY MASS INDEX: 23.54 KG/M2

## 2021-05-31 VITALS — HEIGHT: 67 IN | WEIGHT: 145 LBS | BODY MASS INDEX: 22.76 KG/M2

## 2021-05-31 VITALS — WEIGHT: 167 LBS | BODY MASS INDEX: 26.55 KG/M2

## 2021-05-31 VITALS — BODY MASS INDEX: 24.8 KG/M2 | WEIGHT: 156 LBS

## 2021-05-31 VITALS — WEIGHT: 159 LBS | BODY MASS INDEX: 25.28 KG/M2

## 2021-05-31 VITALS — BODY MASS INDEX: 23.53 KG/M2 | WEIGHT: 148 LBS

## 2021-05-31 VITALS — WEIGHT: 170 LBS | BODY MASS INDEX: 27.03 KG/M2

## 2021-05-31 VITALS — WEIGHT: 165 LBS | BODY MASS INDEX: 26.23 KG/M2

## 2021-05-31 VITALS — WEIGHT: 160 LBS | BODY MASS INDEX: 25.44 KG/M2

## 2021-05-31 VITALS — HEIGHT: 67 IN | BODY MASS INDEX: 23.39 KG/M2 | WEIGHT: 149 LBS

## 2021-05-31 VITALS — BODY MASS INDEX: 26.07 KG/M2 | WEIGHT: 164 LBS

## 2021-05-31 VITALS — WEIGHT: 169 LBS | BODY MASS INDEX: 26.87 KG/M2

## 2021-06-01 ENCOUNTER — RECORDS - HEALTHEAST (OUTPATIENT)
Dept: ADMINISTRATIVE | Facility: CLINIC | Age: 36
End: 2021-06-01

## 2021-06-02 ENCOUNTER — RECORDS - HEALTHEAST (OUTPATIENT)
Dept: ADMINISTRATIVE | Facility: CLINIC | Age: 36
End: 2021-06-02

## 2021-06-02 VITALS — HEIGHT: 67 IN | WEIGHT: 150.5 LBS | BODY MASS INDEX: 23.62 KG/M2

## 2021-06-06 NOTE — TELEPHONE ENCOUNTER
RN cannot approve Refill Request    RN can NOT refill this medication Protocol failed and NO refill given.     Bobbi Dupont, [facility] Connection Triage/Med Refill 2/24/2020    Requested Prescriptions   Pending Prescriptions Disp Refills     citalopram (CELEXA) 20 MG tablet [Pharmacy Med Name: citalopram 20 mg tablet] 135 tablet 3     Sig: take ONE & ONE-HALF tablets ONCE daily       SSRI Refill Protocol  Failed - 2/22/2020  8:50 AM        Failed - PCP or prescribing provider visit in last year     Last office visit with prescriber/PCP: Visit date not found OR same dept: Visit date not found OR same specialty: 1/25/2018 Vicky Hu MD  Last physical: Visit date not found Last MTM visit: Visit date not found   Next visit within 3 mo: Visit date not found  Next physical within 3 mo: Visit date not found  Prescriber OR PCP: Lisa Barnes DO  Last diagnosis associated with med order: There are no diagnoses linked to this encounter.  If protocol passes may refill for 12 months if within 3 months of last provider visit (or a total of 15 months).

## 2021-06-08 NOTE — PROGRESS NOTES
"Assessment/ Plan     1. Amenorrhea  Patient had a positive pregnancy test in clinic today.  Her dates are unclear as she had a spontaneous miscarriage at the end of November.  We are guessing she is approximately 5 weeks today.  Would recommend a dating ultrasound in approximately 3 weeks.  I will call her after that is done and we will make plans for her first OB visit.  - Pregnancy, Urine  - US OB < 14 Weeks With Transvaginal; Future      Subjective:       Geovanna Schwartz is a 31 y.o. female who presents for pregnancy confirmation.  Patient had a spontaneous miscarriage at the end of November.  This was early, first trimester, spontaneous miscarriage.  We did follow her serum beta hCGs to less than 2.  She has not had a period since then.  She had her first positive pregnancy test at home approximately 5 days ago.  She is having some symptoms of pregnancy, specifically irritability, mild nausea, and some breast tenderness.    The following portions of the patient's history were reviewed and updated as appropriate: allergies, current medications, past family history, past medical history, past social history, past surgical history and problem list.    Review of Systems   A 12 point comprehensive review of systems was negative except as noted.      Current Outpatient Prescriptions   Medication Sig Dispense Refill     albuterol (VENTOLIN HFA) 90 mcg/actuation inhaler Inhale 2 puffs every 4 (four) hours as needed.       citalopram (CELEXA) 20 MG tablet Take 1 tablet (20 mg total) by mouth daily. (Patient taking differently: Take 20 mg by mouth daily. Pt taking 1/2 tab daily) 90 tablet 3     prenatal multivit-Ca-min-Fe-FA (PRENATAL VITAMIN) Tab Take 1 tablet by mouth daily.       No current facility-administered medications for this visit.        Objective:        Visit Vitals     /68     Pulse 84     Temp 97.2  F (36.2  C) (Oral)     Resp 16     Ht 5' 6\" (1.676 m)     Wt 150 lb (68 kg)     BMI 24.21 kg/m2 "         General appearance: alert, appears stated age and cooperative      Recent Results (from the past 168 hour(s))   Pregnancy, Urine   Result Value Ref Range    Pregnancy Test, Urine Positive (!) Negative          This note has been dictated using voice recognition software. Any grammatical or context distortions are unintentional and inherent to the software

## 2021-06-09 NOTE — PROGRESS NOTES
Patient is feeling better today.  She had viral gastroenteritis over the weekend and thinks that is why she has lost some weight.  She is now feeling better and keeping liquids and solids down.  We discussed options for first trimester screening and she would like to proceed with the quad screen today.  We will also give her the order for her 20 week ultrasound.  Follow-up in 4 weeks.

## 2021-06-09 NOTE — PROGRESS NOTES
PRENATAL VISIT   FIRST OBSTETRICAL EXAM - OB    Assessment / Impression       Normal first prenatal visit at 12w1d  Discussed orientation, general information, lifestyle, nutrition, exercise,warning signs, resources, lab testing, risk screening and discussed cystic fibrosis screening with patient.  Questions answered.  Having a lot of joint pains  Plan:     Had dating ultrasound due to recent spontaneous .   Initial labs drawn plus ESR and rheumatoid factor..  Prenatal vitamins.  Problem list reviewed and updated.  Genetic screening test options discussed:  Patient elects to decline all testing  Role of ultrasound in pregnancy discussed; fetal survey: requested.  Follow up: No Follow-up on file.      Subjective:    Geovanna Schwartz is a 31 y.o.  here today for her First Obstetrical Exam.  Patient is in today for her first OB visit.  I saw her previously for pregnancy confirmation and we did a dating ultrasound due to the fact that she got pregnant right after a miscarriage.  Overall, she is feeling well except for increasing joint pain.  Both her hands, feet, and right hip.  She is a strong family history of osteoarthritis.  Otherwise she is just feeling fatigued and a little bit of nausea, this is actually improving.  She declines all prenatal screening at this point due to cost and the fact that it would not change what they do with the pregnancy.  We will still plan on getting a 20 week ultrasound.  She is due for a Pap smear today.  I did discuss with her and her blood work her antibody screen will probably be positive due to the fact that she got RhoGam in December.  We will recheck this later in her pregnancy if it is positive.  OB History    Para Term  AB SAB TAB Ectopic Multiple Living   3 1 1 0 1 1 0 0 0 1      # Outcome Date GA Lbr Christopher/2nd Weight Sex Delivery Anes PTL Lv   3 Current            2 SAB 16 6w0d          1 Term 09/10/14 40w3d 17:00 / 01:09 8 lb 1 oz (3.657 kg)  "F Vag-Spont EPI N Y          Expected Date of Delivery: 9/11/2017, by Ultrasound    Past Medical History:   Diagnosis Date     Abnormal Pap smear of cervix     greater than 5 yrs ago; leep      Mental disorder     anxiety, depression     Rh incompatibility      Past Surgical History:   Procedure Laterality Date     CERVICAL BIOPSY  W/ LOOP ELECTRODE EXCISION       tonsil       WRIST SURGERY      left     Social History   Substance Use Topics     Smoking status: Former Smoker     Smokeless tobacco: None     Alcohol use No     Current Outpatient Prescriptions   Medication Sig Dispense Refill     albuterol (VENTOLIN HFA) 90 mcg/actuation inhaler Inhale 2 puffs every 4 (four) hours as needed.       citalopram (CELEXA) 20 MG tablet Take 1 tablet (20 mg total) by mouth daily. (Patient taking differently: Take 20 mg by mouth daily. Pt taking 1/2 tab daily) 90 tablet 3     prenatal multivit-Ca-min-Fe-FA (PRENATAL VITAMIN) Tab Take 1 tablet by mouth daily.       No current facility-administered medications for this visit.      No Known Allergies          High Risk Behavior: None    Review of Systems  General:  Denies problem  Eyes: Denies problem  Ears/Nose/Throat: Denies problem  Cardiovascular: Denies problem  Respiratory:  Denies problem  Gastrointestinal:  Denies problem, Genitourinary: Denies problem  Musculoskeletal:  Denies problem  Skin: Denies problem  Neurologic: Denies problem  Psychiatric: Denies problem  Endocrine: Denies problem  Heme/Lymphatic: Denies problem   Allergic/Immunologic: Denies problem       Objective:   Objective    Vitals:    02/28/17 1130   BP: 98/56   Pulse: 76   Resp: 16   Temp: 98.6  F (37  C)   TempSrc: Oral   Weight: 150 lb (68 kg)   Height: 5' 6.5\" (1.689 m)     Physical Exam:  General Appearance: Alert, cooperative, no distress, appears stated age  Head: Normocephalic, without obvious abnormality, atraumatic  Eyes: PERRL, conjunctiva/corneas clear, EOM's intact  Ears: Normal TM's and " external ear canals, both ears  Nose: Nares normal, septum midline,mucosa normal, no drainage  Throat: Lips, mucosa, and tongue normal; teeth and gums normal  Neck: Supple, symmetrical, trachea midline, no adenopathy;  thyroid: not enlarged, symmetric, no tenderness/mass/nodules; no carotid bruit or JVD  Back: Symmetric, no curvature, ROM normal, no CVA tenderness  Lungs: Clear to auscultation bilaterally, respirations unlabored  Breasts: No breast masses, tenderness, asymmetry, or nipple discharge.  Heart: Regular rate and rhythm, S1 and S2 normal, no murmur, rub, or gallop, Abdomen: Soft, non-tender, bowel sounds active all four quadrants,  no masses, no organomegaly  Pelvic:Normally developed genitalia with no external lesions or eruptions. Vagina and cervix show no lesions, inflammation, discharge or tenderness. No cystocele, No rectocele. Uterus 12 weeks.  No adnexal mass or tenderness.    Extremities: Extremities normal, atraumatic, no cyanosis or edema  Skin: Skin color, texture, turgor normal, no rashes or lesions  Lymph nodes: Cervical, supraclavicular, and axillary nodes normal  Neurologic: Normal     Lab:   Results for orders placed or performed in visit on 01/05/17   Pregnancy, Urine   Result Value Ref Range    Pregnancy Test, Urine Positive (!) Negative

## 2021-06-10 NOTE — PROGRESS NOTES
Patient is doing well today without complaints.  We reviewed her fetal survey which was normal.  She is having a boy and they are very excited.  She is a little bit stressed as they are temporarily living with her dad and stepmom while they build a house.  Plan to follow-up in 4 weeks.

## 2021-06-10 NOTE — PROGRESS NOTES
Having heartburn several times per week, responds to 1 tums.  Having aching joints in hands and feet, some swelling, no redness.  Recommend ice bath to hands and feet after work shifts.  Have to avoid NSAIDS during pregnancy.  Driving 7 hours to [place] this weekend.  Discussed getting out of car and moving around every few hours.  Plan 1 hour glucose test next visit in 4 weeks.

## 2021-06-11 NOTE — PROGRESS NOTES
Patient is struggling a little bit today.  She feels like her anxiety is worsened as they are temporarily living with her parents want her house is being built.  She is currently taking 10 mg of citalopram and we discussed bumping that up if she wants to.  She is very reluctant to even be taking this during pregnancy, so would like to hold off.  She is having a lot of pubic symphysis discomfort and some left sciatic pain.  Plan to follow-up in 3 weeks.

## 2021-06-11 NOTE — PROGRESS NOTES
She is doing fairly well.  We will do her 1 hour glucose challenge and hemoglobin today.  RhoGam is given today for Rh-.  She has had good weight gain.  Follow-up in 2 weeks.

## 2021-06-12 NOTE — PROGRESS NOTES
Doing better this week.  Having some contractions on and off.  No bleeding or leaking.  Will plan to strip membranes at next visit.

## 2021-06-12 NOTE — PROGRESS NOTES
Pt continues with pubic symphysis pain.  Recommend no longer lifting 3 year old daughter and taking things easy.  Will update tetanus today with follow up 3 weeks.

## 2021-06-12 NOTE — PROGRESS NOTES
"Pt overall is feeling pretty well, just tired.  She is having a lot of natalee matos and worried baby is going to \"fall out\".  Reassured her that cervix is closed.  GBS sent.  Follow up weekly.  "

## 2021-06-12 NOTE — PROGRESS NOTES
Feeling very irritable today.  Having a lot of pubic symphysis pain.  Plan to follow up weekly.  GBS neg.

## 2021-06-12 NOTE — ANESTHESIA PREPROCEDURE EVALUATION
Anesthesia Evaluation      Patient summary reviewed     Airway   Mallampati: II  Neck ROM: full   Pulmonary - negative ROS and normal exam                          Cardiovascular - negative ROS  Exercise tolerance: > or = 4 METS  Rhythm: regular        Neuro/Psych    (+) depression, anxiety/panic attacks,     Endo/Other    (+) pregnant     GI/Hepatic/Renal    (+) GERD,        Other findings:    at 39w3d presents for labor. Previous epidural - worked well.      Dental - normal exam                        Anesthesia Plan  Planned anesthetic: epidural    ASA 2     Anesthetic plan and risks discussed with: patient and spouse    Post-op plan: routine recovery        Lillian Palmer MD  Staff Anesthesiologist  [facility]  17

## 2021-06-12 NOTE — PROGRESS NOTES
Patient is doing much better today.  She states she is sleeping better, occasionally using the Vistaril.  She is not having any more headaches.  We will have her off work until maternity leave.  She has had some contractions in the evening, but they usually stop after an hour or so.  No bleeding or leaking.  After discussion of risks and benefits, stripped the membranes today.  She will follow-up next week.

## 2021-06-12 NOTE — PROGRESS NOTES
I had the patient returns today for evaluation.  She emailed me this morning that she was having a severe headache and I wanted to make sure she was not having preeclampsia.  Her blood pressure is beautiful today and she is actually feeling a little bit better.  She feels like a lot of it is her anxiety.  At night she almost has a restless leg sensation where she feels like she cannot sit in one spot and has to move around.  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  She does not work now until late next week.  Her cervix was unchanged.  Discussed having her rest over the weekend, push fluids.  I gave her prescription for some Vistaril 50 mg to take 1-2 at bedtime as needed to help with sleep and relaxation.  Her headache may be allergy related as she does have hayfever and had the windows open last night.  She will try some Claritin over the next week as well.  She will plan to keep her regular scheduled appointment with me next week.  She will update me if anything is worsening.

## 2021-06-12 NOTE — ANESTHESIA PROCEDURE NOTES
Epidural Block    Patient location during procedure: OB  Time Called: 9/7/2017 3:30 AM  Reason for Block:labor epidural  Staffing:  Performing  Anesthesiologist: LILLIAN PALMER  Preanesthetic Checklist  Completed: patient identified, risks, benefits, and alternatives discussed, timeout performed, consent obtained, at patient's request, airway assessed, oxygen available, suction available, emergency drugs available and hand hygiene performed  Procedure  Patient position: sitting  Prep: ChloraPrep  Patient monitoring: continuous pulse oximetry, heart rate and blood pressure  Approach: midline  Location: L3-L4  Injection technique: CHANDA air and CHANDA saline  Number of Attempts:1  Needle  Needle type: Tuohy   Needle gauge: 17 G     : CHANDA at 5 cm. Catheter at 10 cm at skin.  Assessment  Sensory level:  No complications      Additional Notes:    Patient requests labor epidural. Chart reviewed, including labs. Reviewed options and risks with the patient, including but not limited to: bleeding, infection, damage to tissues under the skin (nerves, muscles, blood vessels), hypotension, headache, and epidural failure. Questions answered, consent signed. Patient agrees to elective labor epidural.     Hands washed, sterile technique used, including scrub hat, mask, and sterile gloves.    Aspiration negative. Test dose negative.    Loading dose with bupivacaine 0.125% 10 cc, fentanyl 100 mcg - in divided doses.      Lillian Palmer MD  Staff Anesthesiologist  [facility]

## 2021-06-12 NOTE — ANESTHESIA POSTPROCEDURE EVALUATION
Patient: Geovanna Schwartz  This is a late entry note.  Anesthesia type: epidural    Patient location: HOME  Last vitals: There were no vitals filed for this visit.  Post vital signs: stable  Level of consciousness: awake and responds to simple questions  Post-anesthesia pain: pain controlled  Post-anesthesia nausea and vomiting: no  Pulmonary: unassisted, return to baseline  Cardiovascular: stable and blood pressure at baseline  Hydration: adequate  Anesthetic events: no    QCDR Measures:  ASA# 11 - Maday-op Cardiac Arrest: ASA11B - Patient did NOT experience unanticipated cardiac arrest  ASA# 12 - Maday-op Mortality Rate: ASA12B - Patient did NOT die  ASA# 13 - PACU Re-Intubation Rate: NA - No ETT / LMA used for case  ASA# 10 - Composite Anes Safety: ASA10A - No serious adverse event    Additional Notes:  Pt dc'ed shortly after delivery.  She reports that an anesthesiologist did come see her after delivery.  She has no further questions or concerns

## 2021-06-13 NOTE — PROGRESS NOTES
Assessment/ Plan     1. Postpartum exam  Patient is doing very well 6 weeks postpartum from a vaginal delivery.  She wants to think about different birth control options.  If she decides to get the IUD again, she can see Dr. Elizabeth for that.    2. Anxiety  Patient's anxiety is under excellent control with the citalopram.  She states this is much better than her last postpartum period.    3. Joint pain  Patient is having joint pain again in her hands, mainly MCP joints.  She had a positive rheumatoid factor about 6 months ago.  Because she was pregnant, our plan was to recheck these 12 weeks postpartum.  She is only about 6 weeks postpartum so we will wait an additional 6 weeks.  Would consider rheumatology referral at some point as well.      Subjective:       Geovanna Schwartz is a 32 y.o. female who presents for postpartum check.  Patient is 6 weeks postpartum from a vaginal delivery.  This was her second delivery.  The baby is a little boy named Mitchel.  Breast-feeding is going well.  She stayed on citalopram through her pregnancy and postpartum period and this is really helped with her anxiety.  Her bleeding has stopped.  Bowel and bladder function are normal.  She did a PHQ9 and scored a 1 for sleep.  Her older daughter is adjusting fairly well to the new baby, but is kind of rough.  We discussed birth control options.  She really does not think she wants to go on something oral at this time.  She had the Mirena in her last interpregnancy period.  She also starting to have joint pain again.  About 6 months ago we did some blood work and she was positive for rheumatoid factor.  This is mainly in her hands in the MCP DIP and PIP joints.  It is hard for her to tell if her shoulder pain is from breast-feeding and carrying around a car seat versus something more than that.    Relevant past medical, family, surgical, and social history reviewed with patient, unless noted in HPI, not pertinent for this  "visit.    Review of Systems   A 12 point comprehensive review of systems was negative except as noted.      Current Outpatient Prescriptions   Medication Sig Dispense Refill     albuterol (VENTOLIN HFA) 90 mcg/actuation inhaler Inhale 2 puffs every 4 (four) hours as needed.       citalopram (CELEXA) 20 MG tablet Take 1 tablet (20 mg total) by mouth daily. (Patient taking differently: Take 10 mg by mouth daily. Pt taking 1/2 tab daily) 90 tablet 3     prenatal multivit-Ca-min-Fe-FA (PRENATAL VITAMIN) Tab Take 1 tablet by mouth daily.       No current facility-administered medications for this visit.        Objective:      /60  Pulse 68  Temp 97.9  F (36.6  C) (Oral)   Resp 14  Ht 5' 6.5\" (1.689 m)  Wt 150 lb (68 kg)  BMI 23.85 kg/m2      General appearance: alert, appears stated age and cooperative  Good eye contact and social smile, speech is normal in fluency and content.  Lungs: clear to auscultation bilaterally  Heart: regular rate and rhythm, S1, S2 normal, no murmur, click, rub or gallop  Abdomen: soft, non-tender; bowel sounds normal; no masses,  no organomegaly  Extremities: The MCPs of her right hand second third and fourth are swollen and tender   exam: Normal external genitalia stitches are well-healed, normal bimanual exam.    No results found for this or any previous visit (from the past 168 hour(s)).       This note has been dictated using voice recognition software. Any grammatical or context distortions are unintentional and inherent to the software  "

## 2021-06-14 NOTE — PROGRESS NOTES
Assessment/ Plan     1. Arthralgia  Patient has had about a year history of joint pain mainly in her MCP joints bilateral hands.  She has had some morning stiffness in her feet.  We will do some initial blood work today and then referral to rheumatology.  She did have a positive rheumatoid factor in March, but she was pregnant at the time.  I wanted her to return to recheck this.  - Ambulatory referral to Rheumatology  - Comprehensive Metabolic Panel  - HM2(CBC w/o Differential)  - Thyroid Stimulating Hormone (TSH)  - Rheumatoid Factor Quant  - CCP Antibodies  - Lyme Antibody Cascade  - Vitamin D, Total (25-Hydroxy)  - Erythrocyte Sedimentation Rate  - C-Reactive Protein      Subjective:       Geovanna Schwartz is a 32 y.o. female who presents for discussion of joint pain.  Patient thinks that this started maybe around a year ago.  It is mainly in her left first MCP and her right first and second MCP.  These will become tender and swollen and oftentimes red.  She occasionally will feel it in her PIP joints, but never in the DIP joints.  She has had some discomfort in her shoulders and her wrists, but she thinks that this is from breast-feeding and taking care of her infant.  She doing a lot of lifting spending time in somewhat awkward positions.  In March, we checked a rheumatoid factor and it was positive.  However, not sure if that was may be falsely elevated due to pregnancy.  She is now having some stiffness in her feet when she wakes up in the morning.  She feels it kind of in her Achilles tendon.  When she is up and moving around and feels better.  If she takes ibuprofen, it feels better.  She did state that her joints actually felt a little better when she was pregnant and now have worsened now that she is postpartum.  She has not had any fevers or rashes.  She has a very strong family history of osteoarthritis.  Both her mom and her dad have had hip replacements.  She has not had any weight loss.   "    Relevant past medical, family, surgical, and social history reviewed with patient, unless noted in HPI, not pertinent for this visit.    Review of Systems   A 12 point comprehensive review of systems was negative except as noted.      Current Outpatient Prescriptions   Medication Sig Dispense Refill     citalopram (CELEXA) 20 MG tablet Take 1 tablet (20 mg total) by mouth daily. (Patient taking differently: Take 10 mg by mouth daily. Pt taking 1/2 tab daily) 90 tablet 3     prenatal multivit-Ca-min-Fe-FA (PRENATAL VITAMIN) Tab Take 1 tablet by mouth daily.       albuterol (VENTOLIN HFA) 90 mcg/actuation inhaler Inhale 2 puffs every 4 (four) hours as needed.       No current facility-administered medications for this visit.        Objective:      /64  Pulse 88  Temp 98.4  F (36.9  C) (Oral)   Resp 16  Ht 5' 6.5\" (1.689 m)  Wt 149 lb (67.6 kg)  BMI 23.69 kg/m2      General appearance: alert, appears stated age and cooperative  Lungs: clear to auscultation bilaterally  Heart: regular rate and rhythm, S1, S2 normal, no murmur, click, rub or gallop  Extremities: Her left MCP of her first finger is tender, swollen, and slightly erythematous.  Her right first and second finger MCP are also somewhat tender.  No tenderness of the DIPs or PIPs.  No tenderness of the wrist.  Skin: Skin color, texture, turgor normal. No rashes or lesions      No results found for this or any previous visit (from the past 168 hour(s)).       This note has been dictated using voice recognition software. Any grammatical or context distortions are unintentional and inherent to the software  "

## 2021-06-15 NOTE — PROGRESS NOTES
Geovanna Schwartz is a 35 y.o. female who is being evaluated via a billable video visit.      How would you like to obtain your AVS? MyChart.  If dropped from the video visit, the video invitation should be resent by: Send to e-mail at: [email or web address]  Will anyone else be joining your video visit? No    Video Start Time: 9:40  1. Adjustment disorder with anxious mood  Geovanna presents for virtual visit for refills of her citalopram.  Her symptoms have been under good control with her current dose of medication, we just had not seen her in 3 years.  Refills are sent.  She is due for a physical and will schedule that at her convenience.  As she will be starting methotrexate for rheumatoid arthritis, will update pneumonia and shingles vaccine when she comes in.  - citalopram (CELEXA) 20 MG tablet; Take 1.5 tablets (30 mg total) by mouth daily. Needs appointment before additional refills.  Dispense: 45 tablet; Refill: 12    2. Rheumatoid arthritis involving both hands with positive rheumatoid factor (H)  Patient will be starting methotrexate soon.  She will follow-up for physical when she is off the prednisone before starting methotrexate and we can give her her Pneumovax and shingles vaccine.  We will update her Pap smear.      Subjective   Geovanna Schwartz is 35 y.o. and presents today for the following health issues   HPI   Geovanna presents for refills today.  She is been on citalopram for quite some time for mainly anxiety.  She states is been under good control despite the pandemic.  She has a new job now working at [place] [employer].  She was diagnosed with rheumatoid arthritis a couple of years ago.  She will be starting methotrexate and they want her to get some immunizations.  Its been 3 years since she is had a Pap so would recommend updating that as well.      Objective    Vitals - Patient Reported  Weight (Patient Reported): 147 lb (66.7 kg)    Physical Exam  Good eye contact and social smile,  speech is normal in fluency and content.            Video-Visit Details    Type of service:  Video Visit    Video End Time (time video stopped): 10:00  Originating Location (pt. Location): Home    Distant Location (provider location):  [facility]     [facility]

## 2021-06-15 NOTE — TELEPHONE ENCOUNTER
RN cannot approve Refill Request    RN can NOT refill this medication PCP messaged that patient is overdue for Office Visit. Last office visit: 1/25/2018 Vicky Hu MD Last Physical: Visit date not found Last MTM visit: Visit date not found Last visit same specialty: 1/25/2018 Vicky Hu MD.  Next visit within 3 mo: Visit date not found  Next physical within 3 mo: Visit date not found      Coni Varela, [facility] Triage/Med Refill 3/6/2021    Requested Prescriptions   Pending Prescriptions Disp Refills     citalopram (CELEXA) 20 MG tablet [Pharmacy Med Name: CITALOPRAM HYDROBROMIDE 20MG TABS] 495 tablet 0     Sig: TAKE ONE AND 1/2 TABLETS BY MOUTH ONCE DAILY       [facility] Refill Protocol  Failed - 3/6/2021 11:22 AM        Failed - PCP or prescribing provider visit in last year     Last office visit with prescriber/PCP: 1/25/2018 Vicky Hu MD OR same dept: Visit date not found OR same specialty: 1/25/2018 Vicky Hu MD  Last physical: Visit date not found Last MTM visit: Visit date not found   Next visit within 3 mo: Visit date not found  Next physical within 3 mo: Visit date not found  Prescriber OR PCP: Vicky Hu MD  Last diagnosis associated with med order: 1. Adjustment disorder with anxious mood  - citalopram (CELEXA) 20 MG tablet [Pharmacy Med Name: CITALOPRAM HYDROBROMIDE 20MG TABS]; TAKE ONE AND 1/2 TABLETS BY MOUTH ONCE DAILY  Dispense: 495 tablet; Refill: 0    If protocol passes may refill for 12 months if within 3 months of last provider visit (or a total of 15 months).

## 2021-06-15 NOTE — TELEPHONE ENCOUNTER
This is Dr. Arndt covering for Dr. Hu:  1 month rx signed. Needs appointment before additional refills.    Please assist patient in scheduling appointment.

## 2021-06-15 NOTE — PROGRESS NOTES
Assessment/ Plan     1. Sore throat  Patient has a history of sore throat for 2 weeks with a negative strep.  We discussed differential includes postnasal drip, reflux, or viral.  She had this once before Nasacort actually helped.  We will have her restart that and restart her Zantac from when she was pregnant.  She will keep me posted if things do not seem to be improving over the next several weeks.  She is taking prednisone for recent diagnosis of rheumatoid arthritis, but is down to 5 mg.  - Rapid Strep A Screen-Throat  - Group A Strep, RNA Direct Detection, Throat      Subjective:       Geovanna Schwartz is a 32 y.o. female who presents for evaluation of sore throat.  She states that symptoms started about 2 weeks ago.  She really has only had the sore throat.  Has not had any nasal congestion or cough.  She has had no fever.  No sick exposures.  She is taking 5 mg of prednisone for a recent diagnosis of rheumatoid arthritis.  She states she remembers this happening a year or 2 ago when she tested negative for strep and then they put on Nasacort and that seemed to help.  She does have some allergies.  She had really bad reflux when she was pregnant, but has not noticed that.  She does notice sometimes she will wake up in the middle the night coughing.    Relevant past medical, family, surgical, and social history reviewed with patient, unless noted in HPI, not pertinent for this visit.    Review of Systems   A 12 point comprehensive review of systems was negative except as noted.      Current Outpatient Prescriptions   Medication Sig Dispense Refill     albuterol (VENTOLIN HFA) 90 mcg/actuation inhaler Inhale 2 puffs every 4 (four) hours as needed.       citalopram (CELEXA) 20 MG tablet Take 1 tablet (20 mg total) by mouth daily. 90 tablet 3     predniSONE (DELTASONE) 5 MG tablet Take 11/2 tab po qd for 2 wks then if stable take 1 tab qd. Take with food. 45 tablet 2     prenatal multivit-Ca-min-Fe-FA  (PRENATAL VITAMIN) Tab Take 1 tablet by mouth daily.       No current facility-administered medications for this visit.        Objective:      /62 (Patient Site: Left Arm, Patient Position: Sitting, Cuff Size: Adult Regular)  Pulse 79  Wt 148 lb (67.1 kg)  SpO2 98%  Breastfeeding? Yes  BMI 23.53 kg/m2      General appearance: alert, appears stated age and cooperative  Head: Normocephalic, without obvious abnormality, atraumatic  Eyes: conjunctivae/corneas clear.   Ears: normal TM's and external ear canals both ears  Nose: Nares normal. Septum midline. Mucosa normal. No drainage or sinus tenderness.  Throat: lips, mucosa, and tongue normal; teeth and gums normal  Neck: no adenopathy  Lungs: clear to auscultation bilaterally  Heart: regular rate and rhythm, S1, S2 normal, no murmur, click, rub or gallop      Recent Results (from the past 168 hour(s))   Rapid Strep A Screen-Throat   Result Value Ref Range    Rapid Strep A Antigen No Group A Strep detected, presumptive negative No Group A Strep detected, presumptive negative          This note has been dictated using voice recognition software. Any grammatical or context distortions are unintentional and inherent to the software

## 2021-06-15 NOTE — PROGRESS NOTES
"Geovanna Schwartz who presents today with a chief complaint of  Consult, joint pains      Joint Pains: Yes  Location: shoulders, wrists, hands, ankles, feet  Onset: onset 10/2016  Intensity: 0 /10, prior to prednisone 8/10  AM Stiffness: couple hrs, prior to prednsioen  Alleviating/Aggravating Factors: prednisone  Tolerating Meds: Yes  Other:      ROS:  Patient denies having any dry eyes, some dry mouth \"b/c nursing\", no: oral ulcers, alopecia, rashes, photosensitivity, history of psoriasis, chest pain, shortness of breath, cough, dysuria, history of kidney stones, abdominal pain, diarrhea, hematochezia, dysphagia, history of peptic ulcer disease, history of HIV, tuberculosis, hepatitis B or C, Lyme disease, seizure history, raynaud's, fevers, recent infections, difficulty sleeping, involuntary weight loss, + some fatigue, +depression, +anxiety, no: loss of appetite, numbness and tingling,  sinusitis,  double vision, loss of vision or painful vision.      Problem List:  Patient Active Problem List   Diagnosis      (normal spontaneous vaginal delivery)        PMH:   Past Medical History:   Diagnosis Date     Abnormal Pap smear of cervix     greater than 5 yrs ago; leep      Mental disorder     anxiety, depression       Surgical History:  Past Surgical History:   Procedure Laterality Date     CERVICAL BIOPSY  W/ LOOP ELECTRODE EXCISION       tonsil       WRIST SURGERY      left       Family History:  Family History   Problem Relation Age of Onset     Depression Mother      Depression Father      Hyperlipidemia Father      Hypertension Father      Pancreatic cancer Paternal Aunt        Social History:   reports that she has been smoking.  She has never used smokeless tobacco. She reports that she does not drink alcohol or use illicit drugs.    Allergies:  No Known Allergies     Current Medications:  Current Outpatient Prescriptions   Medication Sig Dispense Refill     albuterol (VENTOLIN HFA) 90 mcg/actuation " "inhaler Inhale 2 puffs every 4 (four) hours as needed.       citalopram (CELEXA) 20 MG tablet Take 1 tablet (20 mg total) by mouth daily. 90 tablet 3     predniSONE (DELTASONE) 10 mg tablet Take 1 tablet (10 mg total) by mouth 2 (two) times a day. (Patient taking differently: Take 10 mg by mouth daily. ) 60 tablet 2     prenatal multivit-Ca-min-Fe-FA (PRENATAL VITAMIN) Tab Take 1 tablet by mouth daily.       No current facility-administered medications for this visit.            Physical Exam:  /70  Pulse 82  Ht 5' 6.5\" (1.689 m)  Wt 145 lb (65.8 kg)  Breastfeeding? No  BMI 23.05 kg/m2  General: A & O x 3 in NAD  HEENT: EOMI, Non injected/non icteric sclera, no oral lesions noted  Neck: Supple, no cervical LAD or thyromegaly noted  Derm: No malar rash, psoriatic lesions or nail pitting appreciated  CV: s1s2 with RRR, no rubs appreciated   Lungs: CTA B/L, no wheezing , rales or rhonci appreciated  GI: Soft, NT/ND, no rebound, no guarding noted, no hepatomegally appreciated  MS: Passive range of motion testing of hands, wrists, ankles and feet did not reproduce any pains, no synovitis noted.  Otherwise patient demonstrated good passive/active ROM over other joints with no warmth, erythema, tenderness or synovitis noted over these joints.  Back: negative straight leg raising, palpating paraspinal lumbar regions and SI joint regions did not reproduce any pains.    Neuro: 5/5 strength in upper and lower extremities b/l, good sensation b/l,  2+ bicep and patella reflexes b/l          Summary/Assessment:    32-year-old female presents with history of developing joint pains/swelling involving feet and hands in the fall 2016.  Pains improved during pregnancy and resurfaced about 1-2 months post pregnancy in November 2017.    Lately joints involved have been hands (MCPs), wrists, shoulders, ankles, feet.    Patient started 20 mg daily of prednisone about a month ago which immediately helped her joint " pains/swelling.  She decrease to 10 mg daily about 3 weeks ago and has remained on this dose since.    Patient is breast-feeding her 4-month-old child (gave birth September 2017) and desires to continue breast-feeding for several more months if possible.  Patient has been taking prednisone in the evenings making sure there is at least a 4 hour gap between intake and breast-feeding.  Patient's family practitioner who has managed to pregnancy is aware of her joint symptoms and has prescribed prednisone.  Objective swelling involving MCP joint documented on a follow-up visit note.    Given the distribution of symptoms along with symmetrical pattern described, improvement with pregnancy with flare flare post pregnancy, excellent response to prednisone and significantly elevated rheumatoid factor/CCP antibodies, her symptoms are likely related to seropositive rheumatoid arthritis.    Although also admits to having some low back pain believes she has had back pain before with no worsening.  Attributes her back pain to playing hockey over several years.  Pain is nonradiating.      Pertinent rheumatology/past medical history (please refer to above for more detailed history):      Seropositive rheumatoid arthritis    Chronic low back pain, bilateral, nonradiating    Lactating mother    Anxiety/depression      Rheumatology medications provided/suggested:    Works as a nurse      Pertinent medication from other providers or from otc (please refer to above for more detailed med list):    Ibuprofen as needed  Celexa      Pertinent medications already tried:           Pertinent lab history:    Noted to have positive: Rheumatoid factor and CCP antibody.    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    Has 2 children a 3-year-old girl and infant boy.    Plan:      Had lengthy  discussion with the patient regarding her diagnosis of rheumatoid arthritis.  As noted above, patient desires to continue lactating and is okay with remaining on prednisone for a few more months.  Will decrease prednisone to 7.5 mg daily for 2 weeks thereafter stable she can decrease further to 5 mg and remain on this dose.  Made aware that if her symptoms resurface she should increase back to 10 mg daily.  Side effect profile of prednisone discussed with patient.    We discussed once she stops breast-feeding likely initiating methotrexate, will obtain some preparatory labs today and baseline chest x-ray.  Side effect profile of methotrexate discussed at length with the patient.  Patient made aware that if desires to become pregnant again, she would need to hold methotrexate for at least 3 months prior to conception.  On methotrexate she would need a form of contraception.      We will x-ray her hands and feet.    Follow-up in 2 months.        Procedure note:       Spent 60 minutes with greater than half of this time spent with the patient going over differential diagnosis, prognosis, treatment plan, medication side effects and  answering questions.    Side effect profile of medications provided/suggested were discussed with the patient.    This note was transcribed using Dragon voice recognition software as a result unintentional grammatical errors or word substitutions may have occurred. Please contact our Rheumatology department if you need any clarification or if you have any related inquiries.    Thank you for referring this patient to our clinic.      Robby Sparks ....................  1/4/2018   9:35 AM

## 2021-06-15 NOTE — TELEPHONE ENCOUNTER
Incoming call from pt following up on request. Pt has been without medication for 4 days. Please send asap if possible. This is already pending in a refill encounter from 3/6

## 2021-06-22 NOTE — TELEPHONE ENCOUNTER
Covering MD please advise.     Pt requesting refill of Citalopram - dose was recently increase to 1.5 tabs daily to equal 30mg daily.   New rx prepped for MD auth.   Pt requesting refill before this weekend, can covering MD send? Thank you

## 2021-06-24 DIAGNOSIS — Z79.899 HIGH RISK MEDICATION USE: ICD-10-CM

## 2021-06-24 DIAGNOSIS — M05.9 SEROPOSITIVE RHEUMATOID ARTHRITIS (H): ICD-10-CM

## 2021-06-24 LAB
ALBUMIN SERPL-MCNC: 4 G/DL (ref 3.4–5)
ALP SERPL-CCNC: 63 U/L (ref 40–150)
ALT SERPL W P-5'-P-CCNC: 22 U/L (ref 0–50)
AST SERPL W P-5'-P-CCNC: 15 U/L (ref 0–45)
BASOPHILS # BLD AUTO: 0 10E9/L (ref 0–0.2)
BASOPHILS NFR BLD AUTO: 0.3 %
BILIRUB DIRECT SERPL-MCNC: 0.2 MG/DL (ref 0–0.2)
BILIRUB SERPL-MCNC: 0.9 MG/DL (ref 0.2–1.3)
CREAT SERPL-MCNC: 0.78 MG/DL (ref 0.52–1.04)
DIFFERENTIAL METHOD BLD: NORMAL
EOSINOPHIL # BLD AUTO: 0.1 10E9/L (ref 0–0.7)
EOSINOPHIL NFR BLD AUTO: 1.4 %
ERYTHROCYTE [DISTWIDTH] IN BLOOD BY AUTOMATED COUNT: 12.1 % (ref 10–15)
GFR SERPL CREATININE-BSD FRML MDRD: >90 ML/MIN/{1.73_M2}
HCT VFR BLD AUTO: 41.8 % (ref 35–47)
HGB BLD-MCNC: 14.5 G/DL (ref 11.7–15.7)
LYMPHOCYTES # BLD AUTO: 1.9 10E9/L (ref 0.8–5.3)
LYMPHOCYTES NFR BLD AUTO: 28.8 %
MCH RBC QN AUTO: 31.5 PG (ref 26.5–33)
MCHC RBC AUTO-ENTMCNC: 34.7 G/DL (ref 31.5–36.5)
MCV RBC AUTO: 91 FL (ref 78–100)
MONOCYTES # BLD AUTO: 0.5 10E9/L (ref 0–1.3)
MONOCYTES NFR BLD AUTO: 7.8 %
NEUTROPHILS # BLD AUTO: 4 10E9/L (ref 1.6–8.3)
NEUTROPHILS NFR BLD AUTO: 61.7 %
PLATELET # BLD AUTO: 192 10E9/L (ref 150–450)
PROT SERPL-MCNC: 7.4 G/DL (ref 6.8–8.8)
RBC # BLD AUTO: 4.6 10E12/L (ref 3.8–5.2)
WBC # BLD AUTO: 6.5 10E9/L (ref 4–11)

## 2021-06-24 PROCEDURE — 82565 ASSAY OF CREATININE: CPT | Performed by: INTERNAL MEDICINE

## 2021-06-24 PROCEDURE — 85025 COMPLETE CBC W/AUTO DIFF WBC: CPT | Performed by: INTERNAL MEDICINE

## 2021-06-24 PROCEDURE — 36415 COLL VENOUS BLD VENIPUNCTURE: CPT | Performed by: INTERNAL MEDICINE

## 2021-06-24 PROCEDURE — 80076 HEPATIC FUNCTION PANEL: CPT | Performed by: INTERNAL MEDICINE

## 2021-07-14 ENCOUNTER — VIRTUAL VISIT (OUTPATIENT)
Dept: RHEUMATOLOGY | Facility: CLINIC | Age: 36
End: 2021-07-14
Payer: COMMERCIAL

## 2021-07-14 DIAGNOSIS — M05.9 SEROPOSITIVE RHEUMATOID ARTHRITIS (H): Primary | ICD-10-CM

## 2021-07-14 DIAGNOSIS — Z79.899 HIGH RISK MEDICATION USE: ICD-10-CM

## 2021-07-14 PROCEDURE — 99214 OFFICE O/P EST MOD 30 MIN: CPT | Mod: 95 | Performed by: INTERNAL MEDICINE

## 2021-07-14 RX ORDER — METHOTREXATE 2.5 MG/1
20 TABLET ORAL
Qty: 96 TABLET | Refills: 0 | Status: SHIPPED | OUTPATIENT
Start: 2021-07-14 | End: 2021-10-05

## 2021-07-14 RX ORDER — SULFASALAZINE 500 MG/1
1000 TABLET, DELAYED RELEASE ORAL 2 TIMES DAILY
Qty: 360 TABLET | Refills: 1 | Status: SHIPPED | OUTPATIENT
Start: 2021-07-14 | End: 2021-11-16

## 2021-07-14 NOTE — PATIENT INSTRUCTIONS
RHEUMATOLOGY    Dr. Toy Toussaint    [facility]  [address]  [place] [address]    Our new phone number for Rheumatology is [phone], this number will be able to help you schedule appointments for Dr. Toussaint or if you have any message you would like sent to us.    Thank you for choosing [facility]!    Sheyla Stover CMA Rheumatology      -----------------    Labs are needed now, mid-June, and mid-November.  Please call to schedule the lab appointments.

## 2021-07-14 NOTE — PROGRESS NOTES
Geovanna Schwartz  is a 35 year old year old female who is being evaluated via a billable video visit.      How would you like to obtain your AVS? MyChart  If the video visit is dropped, the invitation should be resent by: Text to cell phone: Chivo---[phone]  Will anyone else be joining your video visit? No    Rheumatology Video Visit      Geovanna Schwartz MRN# [id number]   YOB: 1985 Age: 35 year old      Date of visit: 7/14/21   PCP: Dr. Vicky Hu at [facility] ([facility])    Chief Complaint   Patient presents with:  Arthritis: RA    Assessment and Plan     1. Seropositive Nonerosive Rheumatoid Arthritis (.3, .8): Dx'd 2017.  Established care with me on 9/24/2020, at which time she had swelling of the bilateral second MCPs and wrists, and was on prednisone 2.5 mg daily.  Previously on HCQ (GI upset). Currently on prednisone 2.5mg daily and sulfasalazine 1000 mg twice daily, and methotrexate 20 mg once weekly (started 5/18/2021).  Mild joint symptoms at the left fifth MTP and the right third MCP, both improved since starting methotrexate.  Advised that she use topical Voltaren gel for these joints, and if not effective then okay to use ibuprofen 200-800 mg 3 times daily PRN for 7 days.  Labs are needed now, next month, and mid-November.  Hopefully with a longer duration of methotrexate use her disease will be better controlled and prednisone can be discontinued.  Chronic illness, progressive.    - Continue Methotrexate 20mg once every 7 days  - Continue folic acid 1mg daily  - Continue sulfasalazine 1000 mg twice daily    - Continue prednisone 2.5mg daily, with plans to reduce in the future   - Start topical Voltaren gel  - Labs now: CBC, Creatinine, Hepatic Panel, ESR, CRP, glucose  - Labs next month: CBC, Cr, Hepatic Panel  - Labs in November: CBC, Creatinine, Hepatic Panel, ESR, CRP, glucose    High risk medication requiring intensive toxicity monitoring at  least quarterly: labs ordered include CBC, Creatinine, Hepatic panel to monitor for cytopenia and hepatotoxicity; checking creatinine as it affects clearance of medication.      # Pregnancy planning: IUD; she verbalized understanding that DMARDs will need adjustment at least 3 months prior to conception    2. Low vitamin D: low in the past and responded to supplemental vitamin D.   - Continue vitamin D 1000 IU daily (to purchase OTC)    3. Cigarette Smoking: advised complete cessation.      4.  Impingement syndrome of both shoulders: Likely also in part due to rheumatoid arthritis that is being addressed in #1.  Has improved with starting methotrexate    5.  Vaccinations: Vaccinations reviewed with Ms. Schwartz.  Risks and benefits of vaccinations were discussed.   I explained that Shingrix is used off label when under 50 years old and that the safety and efficacy of the vaccine has not been tested in people younger than 50 years old.   - Influenza: encouraged yearly vaccination  - [id number]: advised receiving  - [id number]: to receive at least 8 weeks after [id number] is administered  - Shingrix: Advised receiving; patient is going to check on insurance coverage before determining if it is going to be received   - COVID-19: Status-post 2 doses of the Pfizer COVID-19 vaccine, received on 1/8/2021 and 1/28/2021    Total minutes spent in evaluation with patient, documentation, , and review of pertinent studies and chart notes: 21     Ms. Schwartz verbalized agreement with and understanding of the rational for the diagnosis and treatment plan.  All questions were answered to best of my ability and the patient's satisfaction. Ms. Schwartz was advised to contact the clinic with any questions that may arise after the clinic visit.      Thank you for involving me in the care of the patient    Return to clinic: 3 - 4 months      HPI   Geovanna Schwartz is a 35 year old female with a past medical  history significant for allergic rhinitis, tobacco use, and rheumatoid arthritis who is seen for follow-up of rheumatoid arthritis.    6/7/2019 [facility] note by Dr. Wade Weiner document seropositive rheumatoid arthritis on prednisone 2.5 mg daily and sulfasalazine 1000 mg twice daily    9/24/2020: , Ms. Schwartz reports that she has RA.  On prednisone 2.5mg daily now and it helps.  Previously on SSZ 1000mg daily when she was following with [facility] rheumatology and this was effective but not completely controlling symptoms so she was going to increase to BID but then lost to follow-up due to insurance change. Wrists and 2nd MCPs are the most affected joints; intermittent pain and stiffness in these joints; worse in the AM.  If very swollen in these joints then she doesn't want to move them at all, but when able to move without much pain then she feels better and better with increased physical activity. Felt better when on SSZ but not complete control when on SSZ once daily; so she had just started SSZ BID dosing before changing insurance and then not having rheumatology follow-up .  HCQ was used without much improvement and she had GI upset with it; started 3 years ago when first dx'd and breastfeeding.  Currently not planning to have additional children. Birth control with IUD.   Morning stiffness for >45 min.  Was on vitamin D but has stopped.     1/6/2021: Sulfasalazine has been beneficial but she still has active joint symptoms primarily in her wrists and second MCPs.  She states that her symptoms are much more tolerable and she has more better days than she had in the past but she still has these active symptoms.  Pregnancy plans are not yet determined with her  so she is going to have this conversation with him soon.    4/14/2021: Pain in her shoulders if she raises her arms above her head, worse with abduction.  Wrists, second MCPs and MTPs are achy, worse in the morning improves time  and activity.  Morning stiffness for approximately 30-45 minutes.  She has discussed conception planning with her  and they are planning for no more children so she will continue with the IUD.  She states that she would like to start methotrexate.    Today, 7/14/2021: Mild pain at the right third MCP for the past 2 days without swelling or increased warmth.  Also mild pain at the left fifth MTP.  Both of these joints have improved with initiation of methotrexate, that was started on May 18, 2021.  Other joints are doing well.  Morning stiffness for about 30 minutes.  No gelling phenomenon.  Tolerating medications well.    Denies fevers, chills, nausea, vomiting, constipation, diarrhea. No abdominal pain. No chest pain/pressure, palpitations, or shortness of breath. No LE swelling. No neck pain. No oral or nasal sores.  No rash. No sicca symptoms.     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 [employer]    ROS   12 point review of system was completed and negative except as noted in the HPI     Active Problem List     Patient Active Problem List   Diagnosis     Personal history of contraception, presenting hazards to health     Allergic rhinitis due to other allergen     Viral warts     Tobacco use disorder     Other acne     Female genital symptoms     Adjustment disorder with anxiety     CARDIOVASCULAR SCREENING; LDL GOAL LESS THAN 160     Seropositive rheumatoid arthritis (H)     Past Medical History     Past Medical History:   Diagnosis Date     Abnormal Pap smear of cervix     greater than 5 yrs ago; leep      Depressive disorder, not elsewhere classified      Mental disorder     anxiety, depression     PAP SMEAR CERVIX W LGSIL 12/29/2006    Colpo HPV 6 and 16 DNA.-   RUTHANN 1 on of her cervical bx.   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 This  is a more severe abnormality than indicated by the previous biopsies. I would recommend Pap smears as outlined. Please call. Alejandro Peter M.D. She will need follow-up paps at 4,8,12 months. If these are n     Past Surgical History     Past Surgical History:   Procedure Laterality Date     BIOPSY CERVICAL, LOCAL EXCISION, SINGLE/MULTIPLE       LEEP TX, CERVICAL  2/20/2007    RUTHANN I-II     OTHER SURGICAL HISTORY      tonsil     SURGICAL HISTORY OF -   6/22/2004    Left wrist arthroscopy plus triangular      WRIST SURGERY      left     Allergy     Allergies   Allergen Reactions     Hydroxychloroquine Other (See Comments)     Stomach upset, diarrhea     Current Medication List     Current Outpatient Medications   Medication Sig     CELEXA 10 MG OR TABS 30 mg as of 9/23/2020     folic acid (FOLVITE) 1 MG tablet Take 1 tablet (1 mg) by mouth daily      MG OR TABS Take by mouth 3 times daily as needed      methotrexate sodium 2.5 MG TABS 10mg (4 tabs) once weekly x1 week, then increase by 2.5mg (1 tab) each week until taking the goal weekly dose of 20mg (8 tabs) once weekly     predniSONE (DELTASONE) 2.5 MG tablet Take 1 tablet (2.5 mg) by mouth daily     BUPROPION HCL# 300 MG OR TB24 1 TABLET DAILY (Patient not taking: No sig reported)     LORATADINE 10 MG OR TABS ONE DAILY (Patient not taking: No sig reported)     SPRINTEC 28 0.25-35 MG-MCG OR TABS 1 TABLET DAILY (Patient not taking: No sig reported)     sulfaSALAzine ER (AZULFIDINE EN) 500 MG EC tablet Take 2 tablets (1,000 mg) by mouth 2 times daily (Patient not taking: Reported on 7/14/2021)     No current facility-administered medications for this visit.         Social History   See HPI    Family History     Family History   Problem Relation Age of Onset     Hypertension Father      Allergies Father      Allergies Brother      Allergies Sister      Depression Mother      Depression Father      Hyperlipidemia Father      Pancreatic Cancer Paternal Aunt   "    Physical Exam     Temp Readings from Last 3 Encounters:   04/17/09 97.8  F (36.6  C) (Tympanic)   12/13/05 98.7  F (37.1  C) (Oral)   11/14/05 98.5  F (36.9  C) (Oral)     BP Readings from Last 5 Encounters:   04/17/09 110/60   12/17/08 130/68   03/27/08 118/78   01/10/08 120/74   11/12/07 118/72     Pulse Readings from Last 1 Encounters:   04/17/09 88     Resp Readings from Last 1 Encounters:   No data found for Resp     Estimated body mass index is 23.93 kg/m  as calculated from the following:    Height as of 11/23/18: 1.689 m (5' 6.5\").    Weight as of 11/23/18: 68.3 kg (150 lb 8 oz).      GEN: NAD. Healthy appearing adult.   HEENT: MMM.  Anicteric, noninjected sclera. No obvious external lesions of the ear and nose. Hearing intact.  PULM: No increased work of breathing  MSK:  Hands and wrists without swelling.   SKIN: No rash or jaundice seen  PSYCH: Alert. Appropriate.        Labs / Imaging (select studies)     CBC  Recent Labs   Lab Test 06/24/21  1141 01/12/21  1350 09/28/20  1332   WBC 6.5 8.5 7.9   RBC 4.60 4.55 4.58   HGB 14.5 14.4 14.3   HCT 41.8 42.7 41.8   MCV 91 94 91   RDW 12.1 11.4 11.6    205 198   MCH 31.5 31.6 31.2   MCHC 34.7 33.7 34.2   NEUTROPHIL 61.7 63.0 58.6   LYMPH 28.8 26.3 27.4   MONOCYTE 7.8 10.7 11.6   EOSINOPHIL 1.4 0.0 1.9   BASOPHIL 0.3 0.0 0.5   ANEU 4.0 5.4 4.6   ALYM 1.9 2.2 2.2   MARY 0.5 0.9 0.9   AEOS 0.1 0.0 0.2   ABAS 0.0 0.0 0.0     CMP  Recent Labs   Lab Test 06/24/21  1141 01/12/21  1350 09/28/20  1332   GLC  --  88 83   CR 0.78 0.77 0.79   GFRESTIMATED >90 >90 >90   GFRESTBLACK >90 >90 >90   HA  --  9.1  --    BILITOTAL 0.9 0.6 0.4   ALBUMIN 4.0 4.2 3.8   PROTTOTAL 7.4 8.2 7.5   ALKPHOS 63 56 68   AST 15 14 19   ALT 22 15 18     Calcium/VitaminD  Recent Labs   Lab Test 01/12/21  1350   HA 9.1   VITDT 38     ESR/CRP  Recent Labs   Lab Test 01/12/21  1350 09/28/20  1332   SED 5 4   CRP <2.9 6.8       Hepatitis B  Recent Labs   Lab Test 09/28/20  1332 " 02/28/17  1201   HBCAB Nonreactive  --    HEPBANG Nonreactive Negative     Hepatitis C  Recent Labs   Lab Test 09/28/20  1332 01/04/18  1039   HCVAB Nonreactive Negative     Lyme ab screening  Recent Labs   Lab Test 09/28/20  1332   LYMEGM 0.08     Tuberculosis Screening  Recent Labs   Lab Test 02/23/21  1112 01/12/21  1350   TBRST Negative Positive*     HIV Screening  Recent Labs   Lab Test 09/28/20  1332 02/28/17  1201 02/20/14  1046   HIAGAB Nonreactive Negative Negative           [facility] reviewed in CareEverywhere:  11/27/2017: .3, .8  1/4/2018: HCV ab negative, SABRINA negative    Immunization History     Immunization History   Administered Date(s) Administered     COVID-19,PF,Pfizer 01/08/2021     HPV 01/09/2007, 03/20/2007, 03/27/2008          Chart documentation done in part with Dragon Voice recognition Software. Although reviewed after completion, some word and grammatical error may remain.      Video-Visit Details    Type of service:  Video Visit    Video Start Time: 1:28 PM    Video End Time:1:41 PM    Originating Location (pt. Location): [place], [place]    Distant Location (provider location):  [place], [place]     Platform used for Video Visit: Chivo Toussaint MD

## 2021-07-30 ENCOUNTER — LAB (OUTPATIENT)
Dept: LAB | Facility: CLINIC | Age: 36
End: 2021-07-30
Payer: COMMERCIAL

## 2021-07-30 DIAGNOSIS — M05.9 SEROPOSITIVE RHEUMATOID ARTHRITIS (H): ICD-10-CM

## 2021-07-30 DIAGNOSIS — Z79.899 HIGH RISK MEDICATION USE: ICD-10-CM

## 2021-07-30 LAB
ALBUMIN SERPL-MCNC: 4.1 G/DL (ref 3.4–5)
ALP SERPL-CCNC: 54 U/L (ref 40–150)
ALT SERPL W P-5'-P-CCNC: 16 U/L (ref 0–50)
AST SERPL W P-5'-P-CCNC: 16 U/L (ref 0–45)
BASOPHILS # BLD AUTO: 0 10E3/UL (ref 0–0.2)
BASOPHILS NFR BLD AUTO: 0 %
BILIRUB DIRECT SERPL-MCNC: 0.3 MG/DL (ref 0–0.2)
BILIRUB SERPL-MCNC: 1.1 MG/DL (ref 0.2–1.3)
CREAT SERPL-MCNC: 0.83 MG/DL (ref 0.52–1.04)
EOSINOPHIL # BLD AUTO: 0.1 10E3/UL (ref 0–0.7)
EOSINOPHIL NFR BLD AUTO: 1 %
ERYTHROCYTE [DISTWIDTH] IN BLOOD BY AUTOMATED COUNT: 12.4 % (ref 10–15)
GFR SERPL CREATININE-BSD FRML MDRD: >90 ML/MIN/1.73M2
HCT VFR BLD AUTO: 39.4 % (ref 35–47)
HGB BLD-MCNC: 13.5 G/DL (ref 11.7–15.7)
LYMPHOCYTES # BLD AUTO: 2.3 10E3/UL (ref 0.8–5.3)
LYMPHOCYTES NFR BLD AUTO: 22 %
MCH RBC QN AUTO: 31.8 PG (ref 26.5–33)
MCHC RBC AUTO-ENTMCNC: 34.3 G/DL (ref 31.5–36.5)
MCV RBC AUTO: 93 FL (ref 78–100)
MONOCYTES # BLD AUTO: 1 10E3/UL (ref 0–1.3)
MONOCYTES NFR BLD AUTO: 9 %
NEUTROPHILS # BLD AUTO: 7.1 10E3/UL (ref 1.6–8.3)
NEUTROPHILS NFR BLD AUTO: 68 %
PLATELET # BLD AUTO: 207 10E3/UL (ref 150–450)
PROT SERPL-MCNC: 7.3 G/DL (ref 6.8–8.8)
RBC # BLD AUTO: 4.24 10E6/UL (ref 3.8–5.2)
WBC # BLD AUTO: 10.4 10E3/UL (ref 4–11)

## 2021-07-30 PROCEDURE — 82565 ASSAY OF CREATININE: CPT

## 2021-07-30 PROCEDURE — 85025 COMPLETE CBC W/AUTO DIFF WBC: CPT

## 2021-07-30 PROCEDURE — 36415 COLL VENOUS BLD VENIPUNCTURE: CPT

## 2021-07-30 PROCEDURE — 80076 HEPATIC FUNCTION PANEL: CPT

## 2021-09-09 ENCOUNTER — LAB (OUTPATIENT)
Dept: LAB | Facility: CLINIC | Age: 36
End: 2021-09-09
Payer: COMMERCIAL

## 2021-09-09 DIAGNOSIS — Z79.899 HIGH RISK MEDICATION USE: ICD-10-CM

## 2021-09-09 DIAGNOSIS — M05.9 SEROPOSITIVE RHEUMATOID ARTHRITIS (H): ICD-10-CM

## 2021-09-09 LAB
ALBUMIN SERPL-MCNC: 3.8 G/DL (ref 3.4–5)
ALP SERPL-CCNC: 49 U/L (ref 40–150)
ALT SERPL W P-5'-P-CCNC: 15 U/L (ref 0–50)
AST SERPL W P-5'-P-CCNC: 13 U/L (ref 0–45)
BASOPHILS # BLD AUTO: 0 10E3/UL (ref 0–0.2)
BASOPHILS NFR BLD AUTO: 0 %
BILIRUB DIRECT SERPL-MCNC: 0.2 MG/DL (ref 0–0.2)
BILIRUB SERPL-MCNC: 0.7 MG/DL (ref 0.2–1.3)
CREAT SERPL-MCNC: 0.77 MG/DL (ref 0.52–1.04)
EOSINOPHIL # BLD AUTO: 0.1 10E3/UL (ref 0–0.7)
EOSINOPHIL NFR BLD AUTO: 2 %
ERYTHROCYTE [DISTWIDTH] IN BLOOD BY AUTOMATED COUNT: 12.4 % (ref 10–15)
GFR SERPL CREATININE-BSD FRML MDRD: >90 ML/MIN/1.73M2
HCT VFR BLD AUTO: 39.1 % (ref 35–47)
HGB BLD-MCNC: 13.5 G/DL (ref 11.7–15.7)
LYMPHOCYTES # BLD AUTO: 1.8 10E3/UL (ref 0.8–5.3)
LYMPHOCYTES NFR BLD AUTO: 24 %
MCH RBC QN AUTO: 32 PG (ref 26.5–33)
MCHC RBC AUTO-ENTMCNC: 34.5 G/DL (ref 31.5–36.5)
MCV RBC AUTO: 93 FL (ref 78–100)
MONOCYTES # BLD AUTO: 0.8 10E3/UL (ref 0–1.3)
MONOCYTES NFR BLD AUTO: 10 %
NEUTROPHILS # BLD AUTO: 4.8 10E3/UL (ref 1.6–8.3)
NEUTROPHILS NFR BLD AUTO: 64 %
PLATELET # BLD AUTO: 207 10E3/UL (ref 150–450)
PROT SERPL-MCNC: 7 G/DL (ref 6.8–8.8)
RBC # BLD AUTO: 4.22 10E6/UL (ref 3.8–5.2)
WBC # BLD AUTO: 7.6 10E3/UL (ref 4–11)

## 2021-09-09 PROCEDURE — 82565 ASSAY OF CREATININE: CPT

## 2021-09-09 PROCEDURE — 36415 COLL VENOUS BLD VENIPUNCTURE: CPT

## 2021-09-09 PROCEDURE — 80076 HEPATIC FUNCTION PANEL: CPT

## 2021-09-09 PROCEDURE — 85025 COMPLETE CBC W/AUTO DIFF WBC: CPT

## 2021-09-18 ENCOUNTER — HEALTH MAINTENANCE LETTER (OUTPATIENT)
Age: 36
End: 2021-09-18

## 2021-11-16 ENCOUNTER — LAB (OUTPATIENT)
Dept: LAB | Facility: CLINIC | Age: 36
End: 2021-11-16
Payer: COMMERCIAL

## 2021-11-16 ENCOUNTER — VIRTUAL VISIT (OUTPATIENT)
Dept: RHEUMATOLOGY | Facility: CLINIC | Age: 36
End: 2021-11-16
Payer: COMMERCIAL

## 2021-11-16 DIAGNOSIS — M05.9 SEROPOSITIVE RHEUMATOID ARTHRITIS (H): ICD-10-CM

## 2021-11-16 DIAGNOSIS — Z79.899 HIGH RISK MEDICATION USE: ICD-10-CM

## 2021-11-16 DIAGNOSIS — M05.9 SEROPOSITIVE RHEUMATOID ARTHRITIS (H): Primary | ICD-10-CM

## 2021-11-16 LAB
ALBUMIN SERPL-MCNC: 3.7 G/DL (ref 3.4–5)
ALP SERPL-CCNC: 53 U/L (ref 40–150)
ALT SERPL W P-5'-P-CCNC: 33 U/L (ref 0–50)
AST SERPL W P-5'-P-CCNC: 17 U/L (ref 0–45)
BASOPHILS # BLD AUTO: 0 10E3/UL (ref 0–0.2)
BASOPHILS NFR BLD AUTO: 0 %
BILIRUB DIRECT SERPL-MCNC: 0.2 MG/DL (ref 0–0.2)
BILIRUB SERPL-MCNC: 0.8 MG/DL (ref 0.2–1.3)
CREAT SERPL-MCNC: 0.74 MG/DL (ref 0.52–1.04)
CRP SERPL-MCNC: 3.2 MG/L (ref 0–8)
EOSINOPHIL # BLD AUTO: 0.2 10E3/UL (ref 0–0.7)
EOSINOPHIL NFR BLD AUTO: 2 %
ERYTHROCYTE [DISTWIDTH] IN BLOOD BY AUTOMATED COUNT: 12.2 % (ref 10–15)
ERYTHROCYTE [SEDIMENTATION RATE] IN BLOOD BY WESTERGREN METHOD: 5 MM/HR (ref 0–20)
GFR SERPL CREATININE-BSD FRML MDRD: >90 ML/MIN/1.73M2
HCT VFR BLD AUTO: 41.6 % (ref 35–47)
HGB BLD-MCNC: 14.3 G/DL (ref 11.7–15.7)
LYMPHOCYTES # BLD AUTO: 1.8 10E3/UL (ref 0.8–5.3)
LYMPHOCYTES NFR BLD AUTO: 24 %
MCH RBC QN AUTO: 31.2 PG (ref 26.5–33)
MCHC RBC AUTO-ENTMCNC: 34.4 G/DL (ref 31.5–36.5)
MCV RBC AUTO: 91 FL (ref 78–100)
MONOCYTES # BLD AUTO: 0.7 10E3/UL (ref 0–1.3)
MONOCYTES NFR BLD AUTO: 9 %
NEUTROPHILS # BLD AUTO: 4.8 10E3/UL (ref 1.6–8.3)
NEUTROPHILS NFR BLD AUTO: 64 %
PLATELET # BLD AUTO: 222 10E3/UL (ref 150–450)
PROT SERPL-MCNC: 7.4 G/DL (ref 6.8–8.8)
RBC # BLD AUTO: 4.58 10E6/UL (ref 3.8–5.2)
WBC # BLD AUTO: 7.4 10E3/UL (ref 4–11)

## 2021-11-16 PROCEDURE — 86140 C-REACTIVE PROTEIN: CPT

## 2021-11-16 PROCEDURE — 36415 COLL VENOUS BLD VENIPUNCTURE: CPT

## 2021-11-16 PROCEDURE — 99214 OFFICE O/P EST MOD 30 MIN: CPT | Mod: 95 | Performed by: INTERNAL MEDICINE

## 2021-11-16 PROCEDURE — 80076 HEPATIC FUNCTION PANEL: CPT

## 2021-11-16 PROCEDURE — 85025 COMPLETE CBC W/AUTO DIFF WBC: CPT

## 2021-11-16 PROCEDURE — 82565 ASSAY OF CREATININE: CPT

## 2021-11-16 PROCEDURE — 85652 RBC SED RATE AUTOMATED: CPT

## 2021-11-16 RX ORDER — METHOTREXATE 2.5 MG/1
22.5 TABLET ORAL
Qty: 108 TABLET | Refills: 1 | Status: SHIPPED | OUTPATIENT
Start: 2021-11-16 | End: 2022-03-08

## 2021-11-16 RX ORDER — PREDNISONE 2.5 MG/1
2.5 TABLET ORAL DAILY
Qty: 90 TABLET | Refills: 2 | Status: SHIPPED | OUTPATIENT
Start: 2021-11-16 | End: 2022-03-08

## 2021-11-16 NOTE — PATIENT INSTRUCTIONS
RHEUMATOLOGY    Dr. Toy Toussaint    [address] [place] [address]  Phone number: [phone]  Fax number: [phone]    Thank you for choosing [facility]!    Sheyla Stover CMA Rheumatology

## 2021-11-16 NOTE — PROGRESS NOTES
Geovanna Schwartz is a 36 year old year old female who is being evaluated via a billable telephone visit.      What phone number would you like to be contacted at? [phone]   How would you like to obtain your AVS? [facility]      Rheumatology Telephone/Telehealth  Visit      Geovanna Schwartz MRN# [id number]   YOB: 1985 Age: 36 year old      Date of visit: 11/16/21   PCP: Dr. Vicky Hu at [facility] ([facility])    Chief Complaint   Patient presents with:  Rheumatoid Arthritis    Assessment and Plan     1. Seropositive Nonerosive Rheumatoid Arthritis (.3, .8): Dx'd 2017.  Established care with me on 9/24/2020, at which time she had swelling of the bilateral second MCPs and wrists, and was on prednisone 2.5 mg daily.  Previously on HCQ (GI upset), SSZ (partially effective; she thought methotrexate was replacing sulfasalazine so stopped it previously when it was supposed to be combination therapy; she notes that she has a hard time taking twice daily medications). Currently on prednisone 2.5mg daily and methotrexate 20 mg once weekly (started 5/18/2021).  Doing well on this current regimen, but ideally would be off of prednisone.  She was able to stop prednisone 2.5 mg daily for 2 weeks before return of symptoms, suggesting that she needs slight adjustment of her medications to successfully stop prednisone.  Increase methotrexate as noted below.  Advised that she try again to stop prednisone in 3 months.  Chronic illness, progressive.      - Increase Methotrexate from 20mg once every 7 days, to 22.5 mg once every 7 days  - Continue folic acid 1mg daily  - Remove from medication list as she has already discontinued: Sulfasalazine 1000 mg twice daily    - Continue prednisone 2.5mg daily for 3 months, then stop  - Labs monthly [id number]: CBC, Cr, Hepatic Panel  - Labs in November: CBC, Creatinine, Hepatic Panel, ESR, CRP, glucose    High risk medication requiring intensive  toxicity monitoring at least quarterly: labs ordered include CBC, Creatinine, Hepatic panel to monitor for cytopenia and hepatotoxicity; checking creatinine as it affects clearance of medication.      # Pregnancy planning: IUD; she verbalized understanding that DMARDs will need adjustment at least 3 months prior to conception    2. Low vitamin D: low in the past and responded to supplemental vitamin D.   - Continue vitamin D 1000 IU daily (OTC)    3.  Vaccinations: Vaccinations reviewed with Ms. Schwartz.  Risks and benefits of vaccinations were discussed.    - Influenza: encouraged yearly vaccination  - [id number]: advised receiving   - [id number]: to receive at least 8 weeks after [id number] is administered   - COVID-19: Pfizer vaccine on 1/8/2021, 1/28/2021, 11/11/2021    Total minutes spent in evaluation with patient, documentation, , and review of pertinent studies and chart notes: 20     Ms. Schwartz verbalized agreement with and understanding of the rational for the diagnosis and treatment plan.  All questions were answered to best of my ability and the patient's satisfaction. Ms. Schwartz was advised to contact the clinic with any questions that may arise after the clinic visit.      Thank you for involving me in the care of the patient    Return to clinic: 3 - 4 months      HPI   Geovanna Schwartz is a 36 year old female with a past medical history significant for allergic rhinitis, tobacco use, and rheumatoid arthritis who is seen for follow-up of rheumatoid arthritis.    6/7/2019 [facility] clinic note by Dr. Wade Weiner document seropositive rheumatoid arthritis on prednisone 2.5 mg daily and sulfasalazine 1000 mg twice daily    9/24/2020: Ms. Schwartz reports that she has RA.  On prednisone 2.5mg daily now and it helps.  Previously on SSZ 1000mg daily when she was following with [facility] and this was effective but not completely controlling symptoms so she was  going to increase to BID but then lost to follow-up due to insurance change. Wrists and 2nd MCPs are the most affected joints; intermittent pain and stiffness in these joints; worse in the AM.  If very swollen in these joints then she doesn't want to move them at all, but when able to move without much pain then she feels better and better with increased physical activity. Felt better when on SSZ but not complete control when on SSZ once daily; so she had just started SSZ BID dosing before changing insurance and then not having rheumatology follow-up .  HCQ was used without much improvement and she had GI upset with it; started 3 years ago when first dx'd and breastfeeding.  Currently not planning to have additional children. Birth control with IUD.   Morning stiffness for >45 min.  Was on vitamin D but has stopped.     1/6/2021: Sulfasalazine has been beneficial but she still has active joint symptoms primarily in her wrists and second MCPs.  She states that her symptoms are much more tolerable and she has more better days than she had in the past but she still has these active symptoms.  Pregnancy plans are not yet determined with her  so she is going to have this conversation with him soon.    4/14/2021: Pain in her shoulders if she raises her arms above her head, worse with abduction.  Wrists, second MCPs and MTPs are achy, worse in the morning improves time and activity.  Morning stiffness for approximately 30-45 minutes.  She has discussed conception planning with her  and they are planning for no more children so she will continue with the IUD.  She states that she would like to start methotrexate.    7/14/2021: Mild pain at the right third MCP for the past 2 days without swelling or increased warmth.  Also mild pain at the left fifth MTP.  Both of these joints have improved with initiation of methotrexate, that was started on May 18, 2021.  Other joints are doing well.  Morning stiffness for  about 30 minutes.  No gelling phenomenon.  Tolerating medications well.    Today, 11/16/2021: Geovanna Schwartz was unable to connect by video due to her internet connection that she suspects was poor due to it being windy outside and having satellite Internet; she wanted changed to telephone visit.  Doing well with regard to arthritis as long as she remains on prednisone.  She was able to stop prednisone 2.5 mg daily for about 2 weeks before her symptoms started to return so she will be started prednisone and has been doing well since that time.  She also informs me today that she is not taking sulfasalazine and has not been since she started methotrexate because she thought methotrexate was replacing sulfasalazine.  Currently without joint pain or swelling.  No morning stiffness or gelling phenomenon.    Denies fevers, chills, nausea, vomiting, constipation, diarrhea. No abdominal pain. No chest pain/pressure, palpitations, or shortness of breath. No LE swelling. No neck pain. No oral or nasal sores.  No rash. No sicca symptoms.     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 [facility]    ROS   12 point review of system was completed and negative except as noted in the HPI     Active Problem List     Patient Active Problem List   Diagnosis     Personal history of contraception, presenting hazards to health     Allergic rhinitis due to other allergen     Viral warts     Tobacco use disorder     Other acne     Female genital symptoms     Adjustment disorder with anxiety     CARDIOVASCULAR SCREENING; LDL GOAL LESS THAN 160     Seropositive rheumatoid arthritis (H)     Past Medical History     Past Medical History:   Diagnosis Date     Abnormal Pap smear of cervix     greater than 5 yrs ago; leep      Depressive disorder, not elsewhere classified      Mental disorder     anxiety, depression     PAP SMEAR CERVIX W LGSIL 12/29/2006    Colpo HPV 6  and 16 DNA.-   RUTHANN 1 on of her cervical bx.   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 This is a more severe abnormality than indicated by the previous biopsies. I would recommend Pap smears as outlined. Please call. Alejandro Peter M.D. She will need follow-up paps at 4,8,12 months. If these are n     Past Surgical History     Past Surgical History:   Procedure Laterality Date     BIOPSY CERVICAL, LOCAL EXCISION, SINGLE/MULTIPLE       LEEP TX, CERVICAL  2/20/2007    RUTHANN I-II     OTHER SURGICAL HISTORY      tonsil     SURGICAL HISTORY OF -   6/22/2004    Left wrist arthroscopy plus triangular      WRIST SURGERY      left     Allergy     Allergies   Allergen Reactions     Hydroxychloroquine Other (See Comments)     Stomach upset, diarrhea     Current Medication List     Current Outpatient Medications   Medication Sig     CELEXA 10 MG OR TABS 30 mg as of 9/23/2020     diclofenac (VOLTAREN) 1 % topical gel Apply up to 2 grams of 1% gel to hands up to 4 times daily as needed for joint pain (maximum: 8 g per upper extremity per day)     folic acid (FOLVITE) 1 MG tablet Take 1 tablet (1 mg) by mouth daily      MG OR TABS Take by mouth 3 times daily as needed      methotrexate sodium 2.5 MG TABS Take 8 tablets (20 mg) by mouth every 7 days .  This is a once weekly medication.     predniSONE (DELTASONE) 2.5 MG tablet Take 1 tablet (2.5 mg) by mouth daily     sulfaSALAzine ER (AZULFIDINE EN) 500 MG EC tablet Take 2 tablets (1,000 mg) by mouth 2 times daily     BUPROPION HCL# 300 MG OR TB24 1 TABLET DAILY (Patient not taking: No sig reported)     LORATADINE 10 MG OR TABS ONE DAILY (Patient not taking: No sig reported)     SPRINTEC 28 0.25-35 MG-MCG OR TABS 1 TABLET DAILY (Patient not taking: No sig reported)     No current facility-administered medications for this visit.         Social History   See HPI    Family History  "    Family History   Problem Relation Age of Onset     Hypertension Father      Allergies Father      Allergies Brother      Allergies Sister      Depression Mother      Depression Father      Hyperlipidemia Father      Pancreatic Cancer Paternal Aunt      Physical Exam     Temp Readings from Last 3 Encounters:   04/17/09 97.8  F (36.6  C) (Tympanic)   12/13/05 98.7  F (37.1  C) (Oral)   11/14/05 98.5  F (36.9  C) (Oral)     BP Readings from Last 5 Encounters:   04/17/09 110/60   12/17/08 130/68   03/27/08 118/78   01/10/08 120/74   11/12/07 118/72     Pulse Readings from Last 1 Encounters:   04/17/09 88     Resp Readings from Last 1 Encounters:   No data found for Resp     Estimated body mass index is 23.93 kg/m  as calculated from the following:    Height as of 11/23/18: 1.689 m (5' 6.5\").    Weight as of 11/23/18: 68.3 kg (150 lb 8 oz).    GEN: alert and no distress  PSYCH: Alert; coherent speech, normal rate and volume, able to articulate logical thoughts, able   to abstract reason, no tangential thoughts. Normal affect.   RESP: No cough, no audible wheezing, able to talk in full sentences  Remainder of exam unable to be completed due to telephone visits      Labs / Imaging (select studies)     CBC  Recent Labs   Lab Test 11/16/21  0936 09/09/21  0919 07/30/21  1334 06/24/21  1141 01/12/21  1350 09/28/20  1332   WBC 7.4 7.6 10.4 6.5 8.5 7.9   RBC 4.58 4.22 4.24 4.60 4.55 4.58   HGB 14.3 13.5 13.5 14.5 14.4 14.3   HCT 41.6 39.1 39.4 41.8 42.7 41.8   MCV 91 93 93 91 94 91   RDW 12.2 12.4 12.4 12.1 11.4 11.6    207 207 192 205 198   MCH 31.2 32.0 31.8 31.5 31.6 31.2   MCHC 34.4 34.5 34.3 34.7 33.7 34.2   NEUTROPHIL 64 64 68 61.7 63.0 58.6   LYMPH 24 24 22 28.8 26.3 27.4   MONOCYTE 9 10 9 7.8 10.7 11.6   EOSINOPHIL 2 2 1 1.4 0.0 1.9   BASOPHIL 0 0 0 0.3 0.0 0.5   ANEU  --   --   --  4.0 5.4 4.6   ALYM  --   --   --  1.9 2.2 2.2   MARY  --   --   --  0.5 0.9 0.9   AEOS  --   --   --  0.1 0.0 0.2   ABAS  --   " --   --  0.0 0.0 0.0   ANEUTAUTO 4.8 4.8 7.1  --   --   --    ALYMPAUTO 1.8 1.8 2.3  --   --   --    AMONOAUTO 0.7 0.8 1.0  --   --   --    AEOSAUTO 0.2 0.1 0.1  --   --   --    ABSBASO 0.0 0.0 0.0  --   --   --      CMP  Recent Labs   Lab Test 09/09/21  0919 07/30/21  1334 06/24/21  1141 01/12/21  1350 09/28/20  1332   GLC  --   --   --  88 83   CR 0.77 0.83 0.78 0.77 0.79   GFRESTIMATED >90 >90 >90 >90 >90   GFRESTBLACK  --   --  >90 >90 >90   HA  --   --   --  9.1  --    BILITOTAL 0.7 1.1 0.9 0.6 0.4   ALBUMIN 3.8 4.1 4.0 4.2 3.8   PROTTOTAL 7.0 7.3 7.4 8.2 7.5   ALKPHOS 49 54 63 56 68   AST 13 16 15 14 19   ALT 15 16 22 15 18     Calcium/VitaminD  Recent Labs   Lab Test 01/12/21  1350   HA 9.1   VITDT 38     ESR/CRP  Recent Labs   Lab Test 11/16/21  0936 01/12/21  1350 09/28/20  1332   SED 5 5 4   CRP  --  <2.9 6.8     Hepatitis B  Recent Labs   Lab Test 09/28/20  1332 02/28/17  1201   HBCAB Nonreactive  --    HEPBANG Nonreactive Negative     Hepatitis C  Recent Labs   Lab Test 09/28/20  1332 01/04/18  1039   HCVAB Nonreactive Negative     Lyme ab screening  Recent Labs   Lab Test 09/28/20  1332   LYMEGM 0.08     Tuberculosis Screening  Recent Labs   Lab Test 02/23/21  1112 01/12/21  1350   TBRST Negative Positive*     HIV Screening  Recent Labs   Lab Test 09/28/20  1332 02/28/17  1201 02/20/14  1046   HIAGAB Nonreactive Negative Negative     [facility] reviewed in CareEverywhere:  11/27/2017: .3, .8  1/4/2018: HCV ab negative, SABRINA negative    Immunization History     Immunization History   Administered Date(s) Administered     COVID-19,PF,Pfizer (12+ Yrs) 01/08/2021     HPV 01/09/2007, 03/20/2007, 03/27/2008          Chart documentation done in part with Dragon Voice recognition Software. Although reviewed after completion, some word and grammatical error may remain.    Phone call duration with patient (in minutes): 12    Location of patient: [place], [place]   Location of provider:  home    Toy Toussaint MD

## 2022-01-02 ENCOUNTER — HEALTH MAINTENANCE LETTER (OUTPATIENT)
Age: 37
End: 2022-01-02

## 2022-01-03 ENCOUNTER — LAB (OUTPATIENT)
Dept: LAB | Facility: CLINIC | Age: 37
End: 2022-01-03
Payer: COMMERCIAL

## 2022-01-03 DIAGNOSIS — M05.9 SEROPOSITIVE RHEUMATOID ARTHRITIS (H): ICD-10-CM

## 2022-01-03 DIAGNOSIS — Z79.899 HIGH RISK MEDICATION USE: ICD-10-CM

## 2022-01-03 LAB
ALBUMIN SERPL-MCNC: 3.6 G/DL (ref 3.4–5)
ALP SERPL-CCNC: 56 U/L (ref 40–150)
ALT SERPL W P-5'-P-CCNC: 16 U/L (ref 0–50)
AST SERPL W P-5'-P-CCNC: 11 U/L (ref 0–45)
BASOPHILS # BLD AUTO: 0 10E3/UL (ref 0–0.2)
BASOPHILS NFR BLD AUTO: 0 %
BILIRUB DIRECT SERPL-MCNC: 0.1 MG/DL (ref 0–0.2)
BILIRUB SERPL-MCNC: 0.3 MG/DL (ref 0.2–1.3)
CREAT SERPL-MCNC: 0.94 MG/DL (ref 0.52–1.04)
CRP SERPL-MCNC: <2.9 MG/L (ref 0–8)
EOSINOPHIL # BLD AUTO: 0.1 10E3/UL (ref 0–0.7)
EOSINOPHIL NFR BLD AUTO: 2 %
ERYTHROCYTE [DISTWIDTH] IN BLOOD BY AUTOMATED COUNT: 12.5 % (ref 10–15)
ERYTHROCYTE [SEDIMENTATION RATE] IN BLOOD BY WESTERGREN METHOD: 4 MM/HR (ref 0–20)
FASTING STATUS PATIENT QL REPORTED: ABNORMAL
GFR SERPL CREATININE-BSD FRML MDRD: 81 ML/MIN/1.73M2
GLUCOSE BLD-MCNC: 101 MG/DL (ref 70–99)
HCT VFR BLD AUTO: 39.9 % (ref 35–47)
HGB BLD-MCNC: 13.6 G/DL (ref 11.7–15.7)
LYMPHOCYTES # BLD AUTO: 2.4 10E3/UL (ref 0.8–5.3)
LYMPHOCYTES NFR BLD AUTO: 29 %
MCH RBC QN AUTO: 31.2 PG (ref 26.5–33)
MCHC RBC AUTO-ENTMCNC: 34.1 G/DL (ref 31.5–36.5)
MCV RBC AUTO: 92 FL (ref 78–100)
MONOCYTES # BLD AUTO: 0.7 10E3/UL (ref 0–1.3)
MONOCYTES NFR BLD AUTO: 8 %
NEUTROPHILS # BLD AUTO: 5 10E3/UL (ref 1.6–8.3)
NEUTROPHILS NFR BLD AUTO: 61 %
PLATELET # BLD AUTO: 217 10E3/UL (ref 150–450)
PROT SERPL-MCNC: 7.3 G/DL (ref 6.8–8.8)
RBC # BLD AUTO: 4.36 10E6/UL (ref 3.8–5.2)
WBC # BLD AUTO: 8.2 10E3/UL (ref 4–11)

## 2022-01-03 PROCEDURE — 86140 C-REACTIVE PROTEIN: CPT

## 2022-01-03 PROCEDURE — 80076 HEPATIC FUNCTION PANEL: CPT

## 2022-01-03 PROCEDURE — 82565 ASSAY OF CREATININE: CPT

## 2022-01-03 PROCEDURE — 36415 COLL VENOUS BLD VENIPUNCTURE: CPT

## 2022-01-03 PROCEDURE — 82947 ASSAY GLUCOSE BLOOD QUANT: CPT

## 2022-01-03 PROCEDURE — 85652 RBC SED RATE AUTOMATED: CPT

## 2022-01-03 PROCEDURE — 85025 COMPLETE CBC W/AUTO DIFF WBC: CPT

## 2022-02-07 ENCOUNTER — LAB (OUTPATIENT)
Dept: LAB | Facility: CLINIC | Age: 37
End: 2022-02-07
Payer: COMMERCIAL

## 2022-02-07 DIAGNOSIS — Z79.899 HIGH RISK MEDICATION USE: ICD-10-CM

## 2022-02-07 DIAGNOSIS — M05.9 SEROPOSITIVE RHEUMATOID ARTHRITIS (H): ICD-10-CM

## 2022-02-07 LAB
ALBUMIN SERPL-MCNC: 3.8 G/DL (ref 3.4–5)
ALP SERPL-CCNC: 52 U/L (ref 40–150)
ALT SERPL W P-5'-P-CCNC: 17 U/L (ref 0–50)
AST SERPL W P-5'-P-CCNC: 13 U/L (ref 0–45)
BASOPHILS # BLD AUTO: 0 10E3/UL (ref 0–0.2)
BASOPHILS NFR BLD AUTO: 0 %
BILIRUB DIRECT SERPL-MCNC: 0.2 MG/DL (ref 0–0.2)
BILIRUB SERPL-MCNC: 0.4 MG/DL (ref 0.2–1.3)
CREAT SERPL-MCNC: 0.7 MG/DL (ref 0.52–1.04)
EOSINOPHIL # BLD AUTO: 0.1 10E3/UL (ref 0–0.7)
EOSINOPHIL NFR BLD AUTO: 1 %
ERYTHROCYTE [DISTWIDTH] IN BLOOD BY AUTOMATED COUNT: 12.4 % (ref 10–15)
GFR SERPL CREATININE-BSD FRML MDRD: >90 ML/MIN/1.73M2
HCT VFR BLD AUTO: 38.7 % (ref 35–47)
HGB BLD-MCNC: 13.2 G/DL (ref 11.7–15.7)
LYMPHOCYTES # BLD AUTO: 1.6 10E3/UL (ref 0.8–5.3)
LYMPHOCYTES NFR BLD AUTO: 16 %
MCH RBC QN AUTO: 32 PG (ref 26.5–33)
MCHC RBC AUTO-ENTMCNC: 34.1 G/DL (ref 31.5–36.5)
MCV RBC AUTO: 94 FL (ref 78–100)
MONOCYTES # BLD AUTO: 0.6 10E3/UL (ref 0–1.3)
MONOCYTES NFR BLD AUTO: 6 %
NEUTROPHILS # BLD AUTO: 7.3 10E3/UL (ref 1.6–8.3)
NEUTROPHILS NFR BLD AUTO: 77 %
PLATELET # BLD AUTO: 206 10E3/UL (ref 150–450)
PROT SERPL-MCNC: 6.8 G/DL (ref 6.8–8.8)
RBC # BLD AUTO: 4.12 10E6/UL (ref 3.8–5.2)
WBC # BLD AUTO: 9.5 10E3/UL (ref 4–11)

## 2022-02-07 PROCEDURE — 36415 COLL VENOUS BLD VENIPUNCTURE: CPT

## 2022-02-07 PROCEDURE — 80076 HEPATIC FUNCTION PANEL: CPT

## 2022-02-07 PROCEDURE — 85025 COMPLETE CBC W/AUTO DIFF WBC: CPT

## 2022-02-07 PROCEDURE — 82565 ASSAY OF CREATININE: CPT

## 2022-03-08 ENCOUNTER — VIRTUAL VISIT (OUTPATIENT)
Dept: RHEUMATOLOGY | Facility: CLINIC | Age: 37
End: 2022-03-08
Payer: COMMERCIAL

## 2022-03-08 DIAGNOSIS — M05.9 SEROPOSITIVE RHEUMATOID ARTHRITIS (H): Primary | ICD-10-CM

## 2022-03-08 DIAGNOSIS — Z79.899 HIGH RISK MEDICATION USE: ICD-10-CM

## 2022-03-08 PROCEDURE — 99214 OFFICE O/P EST MOD 30 MIN: CPT | Mod: 95 | Performed by: INTERNAL MEDICINE

## 2022-03-08 RX ORDER — PREDNISONE 1 MG/1
2 TABLET ORAL DAILY
Qty: 180 TABLET | Refills: 0 | Status: SHIPPED | OUTPATIENT
Start: 2022-03-08 | End: 2022-06-08

## 2022-03-08 RX ORDER — METHOTREXATE 2.5 MG/1
22.5 TABLET ORAL
Qty: 117 TABLET | Refills: 0 | Status: SHIPPED | OUTPATIENT
Start: 2022-03-08 | End: 2022-06-08

## 2022-03-08 RX ORDER — FOLIC ACID 1 MG/1
1 TABLET ORAL DAILY
Qty: 90 TABLET | Refills: 3 | Status: SHIPPED | OUTPATIENT
Start: 2022-03-08 | End: 2023-01-02

## 2022-03-08 NOTE — PATIENT INSTRUCTIONS
RHEUMATOLOGY    Dr. Toy Toussaint    [address] [place] [address]  Phone number: [phone]  Fax number: [phone]      Thank you for choosing [facility]!    Sheyla Stover CMA Rheumatology

## 2022-03-08 NOTE — PROGRESS NOTES
Geovanna Schwartz  is a 36 year old year old female who is being evaluated via a billable video visit.      How would you like to obtain your AVS? MyChart  If the video visit is dropped, the invitation should be resent by: Text to cell phone: [phone]  Will anyone else be joining your video visit? No    Rheumatology Video Visit      Geovanna Schwartz MRN# [id number]   YOB: 1985 Age: 36 year old      Date of visit: 3/08/22   PCP: Dr. Vicky Hu at [facility] ([facility])    Chief Complaint   Patient presents with:  Rheumatoid Arthritis    Assessment and Plan     1. Seropositive Nonerosive Rheumatoid Arthritis (.3, .8): Dx'd 2017.  Established care with me on 9/24/2020, at which time she had swelling of the bilateral second MCPs and wrists, and was on prednisone 2.5 mg daily.  Previously on HCQ (GI upset), SSZ (partially effective; she thought methotrexate was replacing sulfasalazine so stopped it previously when it was supposed to be combination therapy; she notes that she has a hard time taking twice daily medications). Currently on prednisone 2.5mg every other day and methotrexate 22.5 mg once weekly (started 5/18/2021).  Doing well on this current regimen, but ideally would be off of prednisone.  She was able to stop prednisone 2.5 mg daily for 2 weeks before return of symptoms when taking methotrexate 20 mg once weekly, so the dose of methotrexate was increased and prednisone is slowly being reduced.  When she reduce prednisone to 2.5 mg every other day she felt much worse.  Therefore, reduce prednisone to 2mg daily for now with plans to reduce to 1.5mg or 1mg daily and then continue taper with goal of eventually stopping, and she is hopeful to stop prednisone too.   Chronic illness.   - Continue Methotrexate 22.5 mg once every 7 days  - Continue folic acid 1mg daily  - Reduce prednisone to 2mg daily   - Labs in 3 months: CBC, Creatinine, Hepatic Panel, ESR, CRP,  glucose    High risk medication requiring intensive toxicity monitoring at least quarterly: labs ordered include CBC, Creatinine, Hepatic panel to monitor for cytopenia and hepatotoxicity; checking creatinine as it affects clearance of medication.      # Pregnancy planning: IUD; she verbalized understanding that DMARDs will need adjustment at least 3 months prior to conception    2. Low vitamin D: low in the past and responded to supplemental vitamin D.   - Continue vitamin D 1000 IU daily (OTC)    3.  Vaccinations: Vaccinations reviewed with Ms. Schwartz.  Risks and benefits of vaccinations were discussed.    - Influenza: encouraged yearly vaccination  - [id number]: advised receiving   - [id number]: to receive at least 8 weeks after [id number] is administered   - COVID-19: Pfizer vaccine on 1/8/2021, 1/28/2021, 11/11/2021.  4th dose of the mRNA COVID-19 vaccination is recommended to be received 3 months after the third mRNA COVID-19 vaccination was received.  Discussed delaying due to Guzman COVID19 infection until mid-March or April  Based on our current understanding (and this may change over time as we learn more), medications should be adjusted as noted below, if disease activity allows:  - NSAIDs and Acetaminophen: hold for 24 hours prior to vaccination if able to do so  - Methotrexate should be held for 1-2 weeks after the mRNA COVID-19 vaccine      Total minutes spent in evaluation with patient, documentation, , and review of pertinent studies and chart notes: 20     Ms. Schwartz verbalized agreement with and understanding of the rational for the diagnosis and treatment plan.  All questions were answered to best of my ability and the patient's satisfaction. Ms. Schwartz was advised to contact the clinic with any questions that may arise after the clinic visit.      Thank you for involving me in the care of the patient    Return to clinic: 3 - 4 months      HPI   Geovanna MICHELLE Schwartz is a 36  year old female with a past medical history significant for allergic rhinitis, tobacco use, and rheumatoid arthritis who is seen for follow-up of rheumatoid arthritis.    6/7/2019 [facility] clinic note by Dr. Wade Weiner document seropositive rheumatoid arthritis on prednisone 2.5 mg daily and sulfasalazine 1000 mg twice daily    9/24/2020: Ms. Schwartz reports that she has RA.  On prednisone 2.5mg daily now and it helps.  Previously on SSZ 1000mg daily when she was following with [facility] rheumatology and this was effective but not completely controlling symptoms so she was going to increase to BID but then lost to follow-up due to insurance change. Wrists and 2nd MCPs are the most affected joints; intermittent pain and stiffness in these joints; worse in the AM.  If very swollen in these joints then she doesn't want to move them at all, but when able to move without much pain then she feels better and better with increased physical activity. Felt better when on SSZ but not complete control when on SSZ once daily; so she had just started SSZ BID dosing before changing insurance and then not having rheumatology follow-up .  HCQ was used without much improvement and she had GI upset with it; started 3 years ago when first dx'd and breastfeeding.  Currently not planning to have additional children. Birth control with IUD.   Morning stiffness for >45 min.  Was on vitamin D but has stopped.     1/6/2021: Sulfasalazine has been beneficial but she still has active joint symptoms primarily in her wrists and second MCPs.  She states that her symptoms are much more tolerable and she has more better days than she had in the past but she still has these active symptoms.  Pregnancy plans are not yet determined with her  so she is going to have this conversation with him soon.    4/14/2021: Pain in her shoulders if she raises her arms above her head, worse with abduction.  Wrists, second MCPs and MTPs are achy,  worse in the morning improves time and activity.  Morning stiffness for approximately 30-45 minutes.  She has discussed conception planning with her  and they are planning for no more children so she will continue with the IUD.  She states that she would like to start methotrexate.    7/14/2021: Mild pain at the right third MCP for the past 2 days without swelling or increased warmth.  Also mild pain at the left fifth MTP.  Both of these joints have improved with initiation of methotrexate, that was started on May 18, 2021.  Other joints are doing well.  Morning stiffness for about 30 minutes.  No gelling phenomenon.  Tolerating medications well.    11/16/2021: Geovanna Schwartz was unable to connect by video due to her internet connection that she suspects was poor due to it being windy outside and having satellite Internet; she wanted changed to telephone visit.  Doing well with regard to arthritis as long as she remains on prednisone.  She was able to stop prednisone 2.5 mg daily for about 2 weeks before her symptoms started to return so she will be started prednisone and has been doing well since that time.  She also informs me today that she is not taking sulfasalazine and has not been since she started methotrexate because she thought methotrexate was replacing sulfasalazine.  Currently without joint pain or swelling.  No morning stiffness or gelling phenomenon.    Today, 3/8/2022: Currently doing well.  A bit more achy since reducing prednisone from 2.5 mg daily, to 2.5 mg every other day and has been doing for so for more than 1 week now.  Tolerating methotrexate well.  Arthritis is not limiting her daily activities.  Morning stiffness for less than 20 minutes.    Denies fevers, chills, nausea, vomiting, constipation, diarrhea. No abdominal pain. No chest pain/pressure, palpitations, or shortness of breath. No LE swelling. No neck pain. No oral or nasal sores.  No rash. No sicca symptoms.      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[employer] [place]    ROS   12 point review of system was completed and negative except as noted in the HPI     Active Problem List     Patient Active Problem List   Diagnosis     Personal history of contraception, presenting hazards to health     Allergic rhinitis due to other allergen     Viral warts     Tobacco use disorder     Other acne     Female genital symptoms     Adjustment disorder with anxiety     CARDIOVASCULAR SCREENING; LDL GOAL LESS THAN 160     Seropositive rheumatoid arthritis (H)     Past Medical History     Past Medical History:   Diagnosis Date     Abnormal Pap smear of cervix     greater than 5 yrs ago; leep      Depressive disorder, not elsewhere classified      Mental disorder     anxiety, depression     PAP SMEAR CERVIX W LGSIL 12/29/2006    Colpo HPV 6 and 16 DNA.-   RUTHANN 1 on of her cervical bx.   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 This is a more severe abnormality than indicated by the previous biopsies. I would recommend Pap smears as outlined. Please call. Alejandro Peter M.D. She will need follow-up paps at 4,8,12 months. If these are n     Past Surgical History     Past Surgical History:   Procedure Laterality Date     BIOPSY CERVICAL, LOCAL EXCISION, SINGLE/MULTIPLE       LEEP TX, CERVICAL  2/20/2007    RUTHANN I-II     OTHER SURGICAL HISTORY      tonsil     SURGICAL HISTORY OF -   6/22/2004    Left wrist arthroscopy plus triangular      WRIST SURGERY      left     Allergy     Allergies   Allergen Reactions     Hydroxychloroquine Other (See Comments)     Stomach upset, diarrhea     Current Medication List     Current Outpatient Medications   Medication Sig     CELEXA 10 MG OR TABS 30 mg as of 9/23/2020     diclofenac (VOLTAREN) 1 % topical gel Apply up to 2 grams of 1% gel to hands up  "to 4 times daily as needed for joint pain (maximum: 8 g per upper extremity per day)     folic acid (FOLVITE) 1 MG tablet Take 1 tablet (1 mg) by mouth daily      MG OR TABS Take by mouth 3 times daily as needed      methotrexate sodium 2.5 MG TABS Take 9 tablets (22.5 mg) by mouth every 7 days .  This is a once weekly medication.     predniSONE (DELTASONE) 2.5 MG tablet Take 1 tablet (2.5 mg) by mouth daily     BUPROPION HCL# 300 MG OR TB24 1 TABLET DAILY (Patient not taking: No sig reported)     LORATADINE 10 MG OR TABS ONE DAILY (Patient not taking: No sig reported)     SPRINTEC 28 0.25-35 MG-MCG OR TABS 1 TABLET DAILY (Patient not taking: No sig reported)     No current facility-administered medications for this visit.         Social History   See HPI    Family History     Family History   Problem Relation Age of Onset     Hypertension Father      Allergies Father      Allergies Brother      Allergies Sister      Depression Mother      Depression Father      Hyperlipidemia Father      Pancreatic Cancer Paternal Aunt      Physical Exam     Temp Readings from Last 3 Encounters:   04/17/09 97.8  F (36.6  C) (Tympanic)   12/13/05 98.7  F (37.1  C) (Oral)   11/14/05 98.5  F (36.9  C) (Oral)     BP Readings from Last 5 Encounters:   04/17/09 110/60   12/17/08 130/68   03/27/08 118/78   01/10/08 120/74   11/12/07 118/72     Pulse Readings from Last 1 Encounters:   04/17/09 88     Resp Readings from Last 1 Encounters:   No data found for Resp     Estimated body mass index is 23.93 kg/m  as calculated from the following:    Height as of 11/23/18: 1.689 m (5' 6.5\").    Weight as of 11/23/18: 68.3 kg (150 lb 8 oz).      GEN: NAD. Healthy appearing adult.   HEENT: MMM.  Anicteric, noninjected sclera. No obvious external lesions of the ear and nose. Hearing intact.  PULM: No increased work of breathing  MSK:  Hands and wrists without swelling.  SKIN: No rash or jaundice seen  PSYCH: Alert. Appropriate.        Labs / " Imaging (select studies)     CBC  Recent Labs   Lab Test 02/07/22  1316 01/03/22  1459 11/16/21  0936 07/30/21  1334 06/24/21  1141 01/12/21  1350 09/28/20  1332   WBC 9.5 8.2 7.4   < > 6.5 8.5 7.9   RBC 4.12 4.36 4.58   < > 4.60 4.55 4.58   HGB 13.2 13.6 14.3   < > 14.5 14.4 14.3   HCT 38.7 39.9 41.6   < > 41.8 42.7 41.8   MCV 94 92 91   < > 91 94 91   RDW 12.4 12.5 12.2   < > 12.1 11.4 11.6    217 222   < > 192 205 198   MCH 32.0 31.2 31.2   < > 31.5 31.6 31.2   MCHC 34.1 34.1 34.4   < > 34.7 33.7 34.2   NEUTROPHIL 77 61 64   < > 61.7 63.0 58.6   LYMPH 16 29 24   < > 28.8 26.3 27.4   MONOCYTE 6 8 9   < > 7.8 10.7 11.6   EOSINOPHIL 1 2 2   < > 1.4 0.0 1.9   BASOPHIL 0 0 0   < > 0.3 0.0 0.5   ANEU  --   --   --   --  4.0 5.4 4.6   ALYM  --   --   --   --  1.9 2.2 2.2   MARY  --   --   --   --  0.5 0.9 0.9   AEOS  --   --   --   --  0.1 0.0 0.2   ABAS  --   --   --   --  0.0 0.0 0.0   ANEUTAUTO 7.3 5.0 4.8   < >  --   --   --    ALYMPAUTO 1.6 2.4 1.8   < >  --   --   --    AMONOAUTO 0.6 0.7 0.7   < >  --   --   --    AEOSAUTO 0.1 0.1 0.2   < >  --   --   --    ABSBASO 0.0 0.0 0.0   < >  --   --   --     < > = values in this interval not displayed.     CMP  Recent Labs   Lab Test 02/07/22  1316 01/03/22  1459 11/16/21  0936 07/30/21  1334 06/24/21  1141 01/12/21  1350 09/28/20  1332   GLC  --  101*  --   --   --  88 83   CR 0.70 0.94 0.74   < > 0.78 0.77 0.79   GFRESTIMATED >90 81 >90   < > >90 >90 >90   GFRESTBLACK  --   --   --   --  >90 >90 >90   HA  --   --   --   --   --  9.1  --    BILITOTAL 0.4 0.3 0.8   < > 0.9 0.6 0.4   ALBUMIN 3.8 3.6 3.7   < > 4.0 4.2 3.8   PROTTOTAL 6.8 7.3 7.4   < > 7.4 8.2 7.5   ALKPHOS 52 56 53   < > 63 56 68   AST 13 11 17   < > 15 14 19   ALT 17 16 33   < > 22 15 18    < > = values in this interval not displayed.     Calcium/VitaminD  Recent Labs   Lab Test 01/12/21  1350   HA 9.1   VITDT 38     ESR/CRP  Recent Labs   Lab Test 01/03/22  1459 11/16/21  0936 01/12/21  1350    SED 4 5 5   CRP <2.9 3.2 <2.9       Hepatitis B  Recent Labs   Lab Test 09/28/20  1332 02/28/17  1201   HBCAB Nonreactive  --    HEPBANG Nonreactive Negative     Hepatitis C  Recent Labs   Lab Test 09/28/20  1332 01/04/18  1039   HCVAB Nonreactive Negative     Lyme ab screening  Recent Labs   Lab Test 09/28/20  1332   LYMEGM 0.08     Tuberculosis Screening  Recent Labs   Lab Test 02/23/21  1112 01/12/21  1350   TBRST Negative Positive*     HIV Screening  Recent Labs   Lab Test 09/28/20  1332 02/28/17  1201 02/20/14  1046   HIAGAB Nonreactive Negative Negative       [facility] reviewed in CareEverywhere:  11/27/2017: .3, .8  1/4/2018: HCV ab negative, SABRINA negative    Immunization History     Immunization History   Administered Date(s) Administered     COVID-19,PF,Pfizer (12+ Yrs) 01/08/2021     HPV 01/09/2007, 03/20/2007, 03/27/2008          Chart documentation done in part with Dragon Voice recognition Software. Although reviewed after completion, some word and grammatical error may remain.        Video-Visit Details    Type of service:  Video Visit    Video Start Time: 12:48 PM    Video End Time: 1:00 PM    Originating Location (pt. Location): [place], [place]    Distant Location (provider location):  [place]    Platform used for Video Visit: Suresh Toussaint MD

## 2022-04-01 ENCOUNTER — LAB REQUISITION (OUTPATIENT)
Dept: LAB | Facility: CLINIC | Age: 37
End: 2022-04-01

## 2022-04-01 ENCOUNTER — APPOINTMENT (OUTPATIENT)
Dept: LAB | Facility: CLINIC | Age: 37
End: 2022-04-01
Payer: COMMERCIAL

## 2022-04-01 PROCEDURE — 86481 TB AG RESPONSE T-CELL SUSP: CPT | Performed by: INTERNAL MEDICINE

## 2022-04-03 DIAGNOSIS — F43.22 ADJUSTMENT DISORDER WITH ANXIOUS MOOD: Primary | ICD-10-CM

## 2022-04-03 LAB
GAMMA INTERFERON BACKGROUND BLD IA-ACNC: 0.03 IU/ML
M TB IFN-G BLD-IMP: NEGATIVE
M TB IFN-G CD4+ BCKGRND COR BLD-ACNC: 9.97 IU/ML
MITOGEN IGNF BCKGRD COR BLD-ACNC: 0.19 IU/ML
MITOGEN IGNF BCKGRD COR BLD-ACNC: 0.26 IU/ML
QUANTIFERON MITOGEN: 10 IU/ML
QUANTIFERON NIL TUBE: 0.03 IU/ML
QUANTIFERON TB1 TUBE: 0.22 IU/ML
QUANTIFERON TB2 TUBE: 0.29

## 2022-04-04 NOTE — TELEPHONE ENCOUNTER
"Routing refill request to provider for review/approval because:  Patient needs to be seen because it has been more than 1 year since last office visit.  No upcoming appt.    Due to medication information not transferring due to [facility] please review the medication information prior to signing to ensure accuracy.     Disp Refills Start End OLIVER   citalopram (CELEXA) 20 MG tablet 45 tablet 12 3/15/2021  No   Sig - Route: Take 1.5 tablets (30 mg total) by mouth daily. Needs appointment before additional refills. - Oral   Sent to pharmacy as: citalopram 20 mg tablet (celeXA)   E-Prescribing Status: Receipt confirmed by pharmacy (3/15/2021 10:02 AM CDT)     Last Written Prescription Date:  03/15/2021  Last Fill Quantity: 45,  # refills: 12   Last office visit provider:  03/15/2021    Requested Prescriptions   Pending Prescriptions Disp Refills     citalopram (CELEXA) 20 MG tablet [Pharmacy Med Name: CITALOPRAM HYDROBROMIDE 20MG TABS] 45 tablet 12     Sig: TAKE ONE AND ONE-HALF TABLETS BY MOUTH EVERY DAY       SSRIs Protocol Failed - 4/3/2022  8:48 AM        Failed - Recent (12 mo) or future (30 days) visit within the authorizing provider's specialty     Patient has had an office visit with the authorizing provider or a provider within the authorizing providers department within the previous 12 mos or has a future within next 30 days. See \"Patient Info\" tab in inbasket, or \"Choose Columns\" in Meds & Orders section of the refill encounter.              Passed - Medication is active on med list        Passed - Patient is age 18 or older        Passed - No active pregnancy on record        Passed - No positive pregnancy test in last 12 months             Lashawn Jaeger 04/04/22 4:12 PM  "

## 2022-04-05 RX ORDER — CITALOPRAM HYDROBROMIDE 20 MG/1
TABLET ORAL
Qty: 45 TABLET | Refills: 12 | Status: SHIPPED | OUTPATIENT
Start: 2022-04-05 | End: 2023-05-08

## 2022-05-31 ENCOUNTER — LAB (OUTPATIENT)
Dept: LAB | Facility: CLINIC | Age: 37
End: 2022-05-31
Payer: COMMERCIAL

## 2022-05-31 DIAGNOSIS — M05.9 SEROPOSITIVE RHEUMATOID ARTHRITIS (H): ICD-10-CM

## 2022-05-31 DIAGNOSIS — Z79.899 HIGH RISK MEDICATION USE: ICD-10-CM

## 2022-05-31 LAB
ALBUMIN SERPL-MCNC: 4 G/DL (ref 3.4–5)
ALP SERPL-CCNC: 58 U/L (ref 40–150)
ALT SERPL W P-5'-P-CCNC: 21 U/L (ref 0–50)
AST SERPL W P-5'-P-CCNC: 16 U/L (ref 0–45)
BASOPHILS # BLD AUTO: 0 10E3/UL (ref 0–0.2)
BASOPHILS NFR BLD AUTO: 0 %
BILIRUB DIRECT SERPL-MCNC: 0.2 MG/DL (ref 0–0.2)
BILIRUB SERPL-MCNC: 0.8 MG/DL (ref 0.2–1.3)
CREAT SERPL-MCNC: 0.82 MG/DL (ref 0.52–1.04)
CRP SERPL-MCNC: <2.9 MG/L (ref 0–8)
EOSINOPHIL # BLD AUTO: 0.1 10E3/UL (ref 0–0.7)
EOSINOPHIL NFR BLD AUTO: 2 %
ERYTHROCYTE [DISTWIDTH] IN BLOOD BY AUTOMATED COUNT: 12 % (ref 10–15)
ERYTHROCYTE [SEDIMENTATION RATE] IN BLOOD BY WESTERGREN METHOD: 4 MM/HR (ref 0–20)
FASTING STATUS PATIENT QL REPORTED: NO
GFR SERPL CREATININE-BSD FRML MDRD: >90 ML/MIN/1.73M2
GLUCOSE BLD-MCNC: 100 MG/DL (ref 70–99)
HCT VFR BLD AUTO: 42.4 % (ref 35–47)
HGB BLD-MCNC: 14.2 G/DL (ref 11.7–15.7)
IMM GRANULOCYTES # BLD: 0 10E3/UL
IMM GRANULOCYTES NFR BLD: 0 %
LYMPHOCYTES # BLD AUTO: 2 10E3/UL (ref 0.8–5.3)
LYMPHOCYTES NFR BLD AUTO: 29 %
MCH RBC QN AUTO: 31.5 PG (ref 26.5–33)
MCHC RBC AUTO-ENTMCNC: 33.5 G/DL (ref 31.5–36.5)
MCV RBC AUTO: 94 FL (ref 78–100)
MONOCYTES # BLD AUTO: 0.5 10E3/UL (ref 0–1.3)
MONOCYTES NFR BLD AUTO: 7 %
NEUTROPHILS # BLD AUTO: 4.3 10E3/UL (ref 1.6–8.3)
NEUTROPHILS NFR BLD AUTO: 62 %
PLATELET # BLD AUTO: 225 10E3/UL (ref 150–450)
PROT SERPL-MCNC: 7.6 G/DL (ref 6.8–8.8)
RBC # BLD AUTO: 4.51 10E6/UL (ref 3.8–5.2)
WBC # BLD AUTO: 6.9 10E3/UL (ref 4–11)

## 2022-05-31 PROCEDURE — 85652 RBC SED RATE AUTOMATED: CPT

## 2022-05-31 PROCEDURE — 36415 COLL VENOUS BLD VENIPUNCTURE: CPT

## 2022-05-31 PROCEDURE — 86140 C-REACTIVE PROTEIN: CPT

## 2022-05-31 PROCEDURE — 85025 COMPLETE CBC W/AUTO DIFF WBC: CPT

## 2022-05-31 PROCEDURE — 82947 ASSAY GLUCOSE BLOOD QUANT: CPT

## 2022-05-31 PROCEDURE — 82565 ASSAY OF CREATININE: CPT

## 2022-05-31 PROCEDURE — 80076 HEPATIC FUNCTION PANEL: CPT

## 2022-06-08 ENCOUNTER — OFFICE VISIT (OUTPATIENT)
Dept: RHEUMATOLOGY | Facility: CLINIC | Age: 37
End: 2022-06-08
Payer: COMMERCIAL

## 2022-06-08 VITALS — HEART RATE: 75 BPM | DIASTOLIC BLOOD PRESSURE: 79 MMHG | OXYGEN SATURATION: 98 % | SYSTOLIC BLOOD PRESSURE: 120 MMHG

## 2022-06-08 DIAGNOSIS — Z79.899 HIGH RISK MEDICATION USE: ICD-10-CM

## 2022-06-08 DIAGNOSIS — M05.9 SEROPOSITIVE RHEUMATOID ARTHRITIS (H): Primary | ICD-10-CM

## 2022-06-08 PROCEDURE — 99214 OFFICE O/P EST MOD 30 MIN: CPT | Performed by: INTERNAL MEDICINE

## 2022-06-08 RX ORDER — PREDNISONE 1 MG/1
2 TABLET ORAL DAILY
Qty: 180 TABLET | Refills: 1 | Status: SHIPPED | OUTPATIENT
Start: 2022-06-08 | End: 2023-01-02

## 2022-06-08 RX ORDER — METHOTREXATE 2.5 MG/1
25 TABLET ORAL
Qty: 130 TABLET | Refills: 0 | Status: SHIPPED | OUTPATIENT
Start: 2022-06-08 | End: 2022-09-13

## 2022-06-08 NOTE — PROGRESS NOTES
Rheumatology Video Visit      Geovanna Schwartz MRN# [id number]   YOB: 1985 Age: 36 year old      Date of visit: 6/08/22   PCP: Dr. Vicky Hu at [facility] ([facility])    Chief Complaint   Patient presents with:  Rheumatoid Arthritis    Assessment and Plan     1. Seropositive Nonerosive Rheumatoid Arthritis (.3, .8): Dx'd 2017.  Established care with me on 9/24/2020, at which time she had swelling of the bilateral second MCPs and wrists, and was on prednisone 2.5 mg daily.  Previously on HCQ (GI upset), SSZ (partially effective; she thought methotrexate was replacing sulfasalazine so stopped it previously when it was supposed to be combination therapy; she notes that she has a hard time taking twice daily medications). Currently on prednisone 2.5mg every other day and methotrexate 22.5 mg once weekly (started 5/18/2021).  Doing well on this current regimen, but ideally would be off of prednisone.  She was able to stop prednisone 2.5 mg daily for 2 weeks before return of symptoms when taking methotrexate 20 mg once weekly, so the dose of methotrexate was increased and prednisone is slowly being reduced to the current dose of 2mg daily but still with mild symptoms. Discussed increasing MTX and she is in agreement; then in 3-4 mo try to taper prednisone further. If unable to taper prednisone further then consider addition of a bDMARD.  Chronic illness  - Increase Methotrexate from 22.5 mg once every 7 days, to 25mg once every 7 days to be taken all within the same 24 hours of each week  - Continue folic acid 1mg daily  - Continue prednisone 2mg daily with plans to reduce in the future (next visit)  - Labs in 3 months: CBC, Creatinine, Hepatic Panel, ESR, CRP, glucose    High risk medication requiring intensive toxicity monitoring at least quarterly: labs ordered include CBC, Creatinine, Hepatic panel to monitor for cytopenia and hepatotoxicity; checking creatinine as it  affects clearance of medication.      # Pregnancy planning: IUD; she verbalized understanding that DMARDs will need adjustment at least 3 months prior to conception    2. Low vitamin D: low in the past and responded to supplemental vitamin D.   - Continue vitamin D 1000 IU daily (OTC)    3.  Vaccinations: Vaccinations reviewed with Ms. Schwartz.  Risks and benefits of vaccinations were discussed.    - Influenza: encouraged yearly vaccination  - COVID-19: Pfizer vaccine on 1/8/2021, 1/28/2021, 11/11/2021.  4th dose of the mRNA COVID-19 vaccination is recommended to be received 3 months after the third mRNA COVID-19 vaccination was received.  Discussed delaying due to Guzman COVID19 infection until mid-March or April  Based on our current understanding (and this may change over time as we learn more), medications should be adjusted as noted below, if disease activity allows:  - NSAIDs and Acetaminophen: hold for 24 hours prior to vaccination if able to do so  - Methotrexate should be held for 1-2 weeks after the mRNA COVID-19 vaccine      Total minutes spent in evaluation with patient, documentation, , and review of pertinent studies and chart notes: 14     Ms. Schwartz verbalized agreement with and understanding of the rational for the diagnosis and treatment plan.  All questions were answered to best of my ability and the patient's satisfaction. Ms. Schwartz was advised to contact the clinic with any questions that may arise after the clinic visit.      Thank you for involving me in the care of the patient    Return to clinic: 3 - 4 months      HPI   Geovanna Schwartz is a 36 year old female with a past medical history significant for allergic rhinitis, tobacco use, and rheumatoid arthritis who is seen for follow-up of rheumatoid arthritis.    [facility] by Dr. Wade Weiner document seropositive rheumatoid arthritis on prednisone 2.5 mg daily and sulfasalazine 1000 mg  twice daily    9/24/2020: Ms. Schwartz reports that she has RA.  On prednisone 2.5mg daily now and it helps.  Previously on SSZ 1000mg daily when she was following with [facility] rheumatology and this was effective but not completely controlling symptoms so she was going to increase to BID but then lost to follow-up due to insurance change. Wrists and 2nd MCPs are the most affected joints; intermittent pain and stiffness in these joints; worse in the AM.  If very swollen in these joints then she doesn't want to move them at all, but when able to move without much pain then she feels better and better with increased physical activity. Felt better when on SSZ but not complete control when on SSZ once daily; so she had just started SSZ BID dosing before changing insurance and then not having rheumatology follow-up .  HCQ was used without much improvement and she had GI upset with it; started 3 years ago when first dx'd and breastfeeding.  Currently not planning to have additional children. Birth control with IUD.   Morning stiffness for >45 min.  Was on vitamin D but has stopped.     1/6/2021: Sulfasalazine has been beneficial but she still has active joint symptoms primarily in her wrists and second MCPs.  She states that her symptoms are much more tolerable and she has more better days than she had in the past but she still has these active symptoms.  Pregnancy plans are not yet determined with her  so she is going to have this conversation with him soon.    4/14/2021: Pain in her shoulders if she raises her arms above her head, worse with abduction.  Wrists, second MCPs and MTPs are achy, worse in the morning improves time and activity.  Morning stiffness for approximately 30-45 minutes.  She has discussed conception planning with her  and they are planning for no more children so she will continue with the IUD.  She states that she would like to start methotrexate.    7/14/2021: Mild pain at the right  third MCP for the past 2 days without swelling or increased warmth.  Also mild pain at the left fifth MTP.  Both of these joints have improved with initiation of methotrexate, that was started on May 18, 2021.  Other joints are doing well.  Morning stiffness for about 30 minutes.  No gelling phenomenon.  Tolerating medications well.    11/16/2021: Geovanna Schwartz was unable to connect by video due to her internet connection that she suspects was poor due to it being windy outside and having satellite Internet; she wanted changed to telephone visit.  Doing well with regard to arthritis as long as she remains on prednisone.  She was able to stop prednisone 2.5 mg daily for about 2 weeks before her symptoms started to return so she will be started prednisone and has been doing well since that time.  She also informs me today that she is not taking sulfasalazine and has not been since she started methotrexate because she thought methotrexate was replacing sulfasalazine.  Currently without joint pain or swelling.  No morning stiffness or gelling phenomenon.    3/8/2022: Currently doing well.  A bit more achy since reducing prednisone from 2.5 mg daily, to 2.5 mg every other day and has been doing for so for more than 1 week now.  Tolerating methotrexate well.  Arthritis is not limiting her daily activities.  Morning stiffness for less than 20 minutes.    Today, 6/8/2022: achy when reducing prednisone to 2mg daily but is tolerable, mild. Morning stiffness <30 min. Tolerating medications well. Arthritis not limiting daily activities.     Denies fevers, chills, nausea, vomiting, constipation, diarrhea. No abdominal pain. No chest pain/pressure, palpitations, or shortness of breath. No LE swelling. No neck pain. No oral or nasal sores.  No rash. No sicca symptoms.     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[place] [employer] [facility] [place]    ROS   12 point review of  system was completed and negative except as noted in the HPI     Active Problem List     Patient Active Problem List   Diagnosis     Personal history of contraception, presenting hazards to health     Allergic rhinitis due to other allergen     Viral warts     Tobacco use disorder     Other acne     Female genital symptoms     Adjustment disorder with anxiety     CARDIOVASCULAR SCREENING; LDL GOAL LESS THAN 160     Seropositive rheumatoid arthritis (H)     Past Medical History     Past Medical History:   Diagnosis Date     Abnormal Pap smear of cervix     greater than 5 yrs ago; leep      Depressive disorder, not elsewhere classified      Mental disorder     anxiety, depression     PAP SMEAR CERVIX W LGSIL 12/29/2006    Colpo HPV 6 and 16 DNA.-   RUTHANN 1 on of her cervical bx.   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 This is a more severe abnormality than indicated by the previous biopsies. I would recommend Pap smears as outlined. Please call. Alejandro Peter M.D. She will need follow-up paps at 4,8,12 months. If these are n     Past Surgical History     Past Surgical History:   Procedure Laterality Date     BIOPSY CERVICAL, LOCAL EXCISION, SINGLE/MULTIPLE       LEEP TX, CERVICAL  2/20/2007    RUTHANN I-II     OTHER SURGICAL HISTORY      tonsil     SURGICAL HISTORY OF -   6/22/2004    Left wrist arthroscopy plus triangular      WRIST SURGERY      left     Allergy     Allergies   Allergen Reactions     Hydroxychloroquine Other (See Comments)     Stomach upset, diarrhea     Current Medication List     Current Outpatient Medications   Medication Sig     CELEXA 10 MG OR TABS 30 mg as of 9/23/2020     citalopram (CELEXA) 20 MG tablet TAKE ONE AND ONE-HALF TABLETS BY MOUTH EVERY DAY     diclofenac (VOLTAREN) 1 % topical gel Apply up to 2 grams of 1% gel to hands up to 4 times daily as needed for joint pain (maximum: 8 g per upper  "extremity per day)     folic acid (FOLVITE) 1 MG tablet Take 1 tablet (1 mg) by mouth daily      MG OR TABS Take by mouth 3 times daily as needed      methotrexate sodium 2.5 MG TABS Take 9 tablets (22.5 mg) by mouth every 7 days .  This is a once weekly medication.     BUPROPION HCL# 300 MG OR TB24 1 TABLET DAILY (Patient not taking: No sig reported)     LORATADINE 10 MG OR TABS ONE DAILY (Patient not taking: No sig reported)     SPRINTEC 28 0.25-35 MG-MCG OR TABS 1 TABLET DAILY (Patient not taking: No sig reported)     No current facility-administered medications for this visit.         Social History   See HPI    Family History     Family History   Problem Relation Age of Onset     Hypertension Father      Allergies Father      Allergies Brother      Allergies Sister      Depression Mother      Depression Father      Hyperlipidemia Father      Pancreatic Cancer Paternal Aunt      Physical Exam     Temp Readings from Last 3 Encounters:   04/17/09 97.8  F (36.6  C) (Tympanic)   12/13/05 98.7  F (37.1  C) (Oral)   11/14/05 98.5  F (36.9  C) (Oral)     BP Readings from Last 5 Encounters:   06/08/22 120/79   04/17/09 110/60   12/17/08 130/68   03/27/08 118/78   01/10/08 120/74     Pulse Readings from Last 1 Encounters:   06/08/22 75     Resp Readings from Last 1 Encounters:   No data found for Resp     Estimated body mass index is 23.93 kg/m  as calculated from the following:    Height as of 11/23/18: 1.689 m (5' 6.5\").    Weight as of 11/23/18: 68.3 kg (150 lb 8 oz).      GEN: NAD. Healthy appearing adult.   HEENT:  Anicteric, noninjected sclera. No obvious external lesions of the ear and nose. Hearing intact.  CV: S1, S2. RRR. No m/r/g  PULM: No increased work of breathing. CTA bilaterally   MSK: MCPs, PIPs, DIPs without swelling or tenderness to palpation.  Wrists without swelling or tenderness to palpation.  Elbows and shoulders without swelling or tenderness to palpation.   Knees, ankles, and MTPs without " swelling or tenderness to palpation.    SKIN: No rash or jaundice seen  PSYCH: Alert. Appropriate.            Labs / Imaging (select studies)     CBC  Recent Labs   Lab Test 05/31/22  1452 02/07/22  1316 01/03/22  1459 07/30/21  1334 06/24/21  1141 01/12/21  1350 09/28/20  1332   WBC 6.9 9.5 8.2   < > 6.5 8.5 7.9   RBC 4.51 4.12 4.36   < > 4.60 4.55 4.58   HGB 14.2 13.2 13.6   < > 14.5 14.4 14.3   HCT 42.4 38.7 39.9   < > 41.8 42.7 41.8   MCV 94 94 92   < > 91 94 91   RDW 12.0 12.4 12.5   < > 12.1 11.4 11.6    206 217   < > 192 205 198   MCH 31.5 32.0 31.2   < > 31.5 31.6 31.2   MCHC 33.5 34.1 34.1   < > 34.7 33.7 34.2   NEUTROPHIL 62 77 61   < > 61.7 63.0 58.6   LYMPH 29 16 29   < > 28.8 26.3 27.4   MONOCYTE 7 6 8   < > 7.8 10.7 11.6   EOSINOPHIL 2 1 2   < > 1.4 0.0 1.9   BASOPHIL 0 0 0   < > 0.3 0.0 0.5   ANEU  --   --   --   --  4.0 5.4 4.6   ALYM  --   --   --   --  1.9 2.2 2.2   MARY  --   --   --   --  0.5 0.9 0.9   AEOS  --   --   --   --  0.1 0.0 0.2   ABAS  --   --   --   --  0.0 0.0 0.0   ANEUTAUTO 4.3 7.3 5.0   < >  --   --   --    ALYMPAUTO 2.0 1.6 2.4   < >  --   --   --    AMONOAUTO 0.5 0.6 0.7   < >  --   --   --    AEOSAUTO 0.1 0.1 0.1   < >  --   --   --    ABSBASO 0.0 0.0 0.0   < >  --   --   --     < > = values in this interval not displayed.     CMP  Recent Labs   Lab Test 05/31/22  1452 02/07/22  1316 01/03/22  1459 07/30/21  1334 06/24/21  1141 01/12/21  1350 09/28/20  1332   *  --  101*  --   --  88 83   CR 0.82 0.70 0.94   < > 0.78 0.77 0.79   GFRESTIMATED >90 >90 81   < > >90 >90 >90   GFRESTBLACK  --   --   --   --  >90 >90 >90   HA  --   --   --   --   --  9.1  --    BILITOTAL 0.8 0.4 0.3   < > 0.9 0.6 0.4   ALBUMIN 4.0 3.8 3.6   < > 4.0 4.2 3.8   PROTTOTAL 7.6 6.8 7.3   < > 7.4 8.2 7.5   ALKPHOS 58 52 56   < > 63 56 68   AST 16 13 11   < > 15 14 19   ALT 21 17 16   < > 22 15 18    < > = values in this interval not displayed.     Calcium/VitaminD  Recent Labs   Lab Test  01/12/21  1350   HA 9.1   VITDT 38     ESR/CRP  Recent Labs   Lab Test 05/31/22  1452 01/03/22  1459 11/16/21  0936   SED 4 4 5   CRP <2.9 <2.9 3.2     Hepatitis B  Recent Labs   Lab Test 09/28/20  1332 02/28/17  1201   HBCAB Nonreactive  --    HEPBANG Nonreactive Negative     Hepatitis C  Recent Labs   Lab Test 09/28/20  1332 01/04/18  1039   HCVAB Nonreactive Negative     Lyme ab screening  Recent Labs   Lab Test 09/28/20  1332   LYMEGM 0.08     Tuberculosis Screening  Recent Labs   Lab Test 04/01/22  1427 02/23/21  1112 01/12/21  1350   TBRES Negative  --   --    TBRST  --  Negative Positive*     HIV Screening  Recent Labs   Lab Test 09/28/20  1332 02/28/17  1201   HIAGAB Nonreactive Negative       [facility] reviewed in CareEverywhere:  11/27/2017: .3, .8  1/4/2018: HCV ab negative, SABRINA negative    Immunization History     Immunization History   Administered Date(s) Administered     COVID-19,PF,Pfizer (12+ Yrs) 01/08/2021     HPV 01/09/2007, 03/20/2007, 03/27/2008          Chart documentation done in part with Dragon Voice recognition Software. Although reviewed after completion, some word and grammatical error may remain.        Toy Toussaint MD

## 2022-06-08 NOTE — NURSING NOTE
RAPID3 (0-30) Cumulative Score  8.0          RAPID3 Weighted Score (divide #4 by 3 and that is the weighted score)  2.6

## 2022-06-08 NOTE — PATIENT INSTRUCTIONS
COVID-19 vaccination    4th dose of the mRNA COVID-19 vaccination is recommended to be received 3 months after the third mRNA COVID-19 vaccination was received.  Discussed delaying due to Guzman COVID19 infection until mid-March or April    Based on our current understanding (and this may change over time as we learn more), medications should be adjusted as noted below, if disease activity allows:  - NSAIDs and Acetaminophen: hold for 24 hours prior to vaccination if able to do so  - Methotrexate should be held for 1-2 weeks after the mRNA COVID-19 vaccine

## 2022-07-27 ENCOUNTER — LAB (OUTPATIENT)
Dept: LAB | Facility: CLINIC | Age: 37
End: 2022-07-27
Payer: COMMERCIAL

## 2022-07-27 DIAGNOSIS — Z79.899 HIGH RISK MEDICATION USE: ICD-10-CM

## 2022-07-27 DIAGNOSIS — M05.9 SEROPOSITIVE RHEUMATOID ARTHRITIS (H): ICD-10-CM

## 2022-07-27 LAB
ALBUMIN SERPL-MCNC: 3.9 G/DL (ref 3.4–5)
ALP SERPL-CCNC: 52 U/L (ref 40–150)
ALT SERPL W P-5'-P-CCNC: 23 U/L (ref 0–50)
AST SERPL W P-5'-P-CCNC: 17 U/L (ref 0–45)
BASOPHILS # BLD AUTO: 0 10E3/UL (ref 0–0.2)
BASOPHILS NFR BLD AUTO: 1 %
BILIRUB DIRECT SERPL-MCNC: 0.2 MG/DL (ref 0–0.2)
BILIRUB SERPL-MCNC: 0.9 MG/DL (ref 0.2–1.3)
CREAT SERPL-MCNC: 1.08 MG/DL (ref 0.52–1.04)
CRP SERPL-MCNC: 3.3 MG/L (ref 0–8)
EOSINOPHIL # BLD AUTO: 0.1 10E3/UL (ref 0–0.7)
EOSINOPHIL NFR BLD AUTO: 2 %
ERYTHROCYTE [DISTWIDTH] IN BLOOD BY AUTOMATED COUNT: 12.9 % (ref 10–15)
ERYTHROCYTE [SEDIMENTATION RATE] IN BLOOD BY WESTERGREN METHOD: 5 MM/HR (ref 0–20)
FASTING STATUS PATIENT QL REPORTED: NORMAL
GFR SERPL CREATININE-BSD FRML MDRD: 68 ML/MIN/1.73M2
GLUCOSE BLD-MCNC: 82 MG/DL (ref 70–99)
HCT VFR BLD AUTO: 40.3 % (ref 35–47)
HGB BLD-MCNC: 13.6 G/DL (ref 11.7–15.7)
IMM GRANULOCYTES # BLD: 0 10E3/UL
IMM GRANULOCYTES NFR BLD: 0 %
LYMPHOCYTES # BLD AUTO: 1.8 10E3/UL (ref 0.8–5.3)
LYMPHOCYTES NFR BLD AUTO: 26 %
MCH RBC QN AUTO: 31.8 PG (ref 26.5–33)
MCHC RBC AUTO-ENTMCNC: 33.7 G/DL (ref 31.5–36.5)
MCV RBC AUTO: 94 FL (ref 78–100)
MONOCYTES # BLD AUTO: 0.7 10E3/UL (ref 0–1.3)
MONOCYTES NFR BLD AUTO: 10 %
NEUTROPHILS # BLD AUTO: 4.1 10E3/UL (ref 1.6–8.3)
NEUTROPHILS NFR BLD AUTO: 61 %
PLATELET # BLD AUTO: 217 10E3/UL (ref 150–450)
PROT SERPL-MCNC: 7.5 G/DL (ref 6.8–8.8)
RBC # BLD AUTO: 4.28 10E6/UL (ref 3.8–5.2)
WBC # BLD AUTO: 6.7 10E3/UL (ref 4–11)

## 2022-07-27 PROCEDURE — 36415 COLL VENOUS BLD VENIPUNCTURE: CPT

## 2022-07-27 PROCEDURE — 80076 HEPATIC FUNCTION PANEL: CPT

## 2022-07-27 PROCEDURE — 86140 C-REACTIVE PROTEIN: CPT

## 2022-07-27 PROCEDURE — 82947 ASSAY GLUCOSE BLOOD QUANT: CPT

## 2022-07-27 PROCEDURE — 82565 ASSAY OF CREATININE: CPT

## 2022-07-27 PROCEDURE — 85652 RBC SED RATE AUTOMATED: CPT

## 2022-07-27 PROCEDURE — 85025 COMPLETE CBC W/AUTO DIFF WBC: CPT

## 2022-08-08 ENCOUNTER — E-VISIT (OUTPATIENT)
Dept: FAMILY MEDICINE | Facility: CLINIC | Age: 37
End: 2022-08-08
Payer: COMMERCIAL

## 2022-08-08 DIAGNOSIS — F43.23 ADJUSTMENT DISORDER WITH MIXED ANXIETY AND DEPRESSED MOOD: Primary | ICD-10-CM

## 2022-08-08 PROCEDURE — 99421 OL DIG E/M SVC 5-10 MIN: CPT | Performed by: FAMILY MEDICINE

## 2022-08-08 RX ORDER — HYDROXYZINE PAMOATE 25 MG/1
25 CAPSULE ORAL 3 TIMES DAILY PRN
Qty: 30 CAPSULE | Refills: 3 | Status: SHIPPED | OUTPATIENT
Start: 2022-08-08

## 2022-08-08 ASSESSMENT — ANXIETY QUESTIONNAIRES
4. TROUBLE RELAXING: NOT AT ALL
3. WORRYING TOO MUCH ABOUT DIFFERENT THINGS: SEVERAL DAYS
GAD7 TOTAL SCORE: 4
8. IF YOU CHECKED OFF ANY PROBLEMS, HOW DIFFICULT HAVE THESE MADE IT FOR YOU TO DO YOUR WORK, TAKE CARE OF THINGS AT HOME, OR GET ALONG WITH OTHER PEOPLE?: SOMEWHAT DIFFICULT
1. FEELING NERVOUS, ANXIOUS, OR ON EDGE: MORE THAN HALF THE DAYS
GAD7 TOTAL SCORE: 4
2. NOT BEING ABLE TO STOP OR CONTROL WORRYING: NOT AT ALL
GAD7 TOTAL SCORE: 4
5. BEING SO RESTLESS THAT IT IS HARD TO SIT STILL: NOT AT ALL
6. BECOMING EASILY ANNOYED OR IRRITABLE: SEVERAL DAYS
7. FEELING AFRAID AS IF SOMETHING AWFUL MIGHT HAPPEN: NOT AT ALL
7. FEELING AFRAID AS IF SOMETHING AWFUL MIGHT HAPPEN: NOT AT ALL

## 2022-08-09 ASSESSMENT — ANXIETY QUESTIONNAIRES: GAD7 TOTAL SCORE: 4

## 2022-09-09 ENCOUNTER — LAB (OUTPATIENT)
Dept: LAB | Facility: CLINIC | Age: 37
End: 2022-09-09
Payer: COMMERCIAL

## 2022-09-09 DIAGNOSIS — M05.9 SEROPOSITIVE RHEUMATOID ARTHRITIS (H): ICD-10-CM

## 2022-09-09 DIAGNOSIS — Z79.899 HIGH RISK MEDICATION USE: ICD-10-CM

## 2022-09-09 LAB
ALBUMIN SERPL BCG-MCNC: 4.5 G/DL (ref 3.5–5.2)
ALP SERPL-CCNC: 58 U/L (ref 35–104)
ALT SERPL W P-5'-P-CCNC: 30 U/L (ref 10–35)
AST SERPL W P-5'-P-CCNC: 30 U/L (ref 10–35)
BASOPHILS # BLD AUTO: 0 10E3/UL (ref 0–0.2)
BASOPHILS NFR BLD AUTO: 0 %
BILIRUB DIRECT SERPL-MCNC: <0.2 MG/DL (ref 0–0.3)
BILIRUB SERPL-MCNC: 0.9 MG/DL
CREAT SERPL-MCNC: 0.78 MG/DL (ref 0.51–0.95)
CRP SERPL-MCNC: <3 MG/L
EOSINOPHIL # BLD AUTO: 0.1 10E3/UL (ref 0–0.7)
EOSINOPHIL NFR BLD AUTO: 1 %
ERYTHROCYTE [DISTWIDTH] IN BLOOD BY AUTOMATED COUNT: 13.1 % (ref 10–15)
ERYTHROCYTE [SEDIMENTATION RATE] IN BLOOD BY WESTERGREN METHOD: 5 MM/HR (ref 0–20)
FASTING STATUS PATIENT QL REPORTED: YES
GFR SERPL CREATININE-BSD FRML MDRD: >90 ML/MIN/1.73M2
GLUCOSE SERPL-MCNC: 116 MG/DL (ref 70–99)
HCT VFR BLD AUTO: 43.2 % (ref 35–47)
HGB BLD-MCNC: 14.6 G/DL (ref 11.7–15.7)
IMM GRANULOCYTES # BLD: 0 10E3/UL
IMM GRANULOCYTES NFR BLD: 0 %
LYMPHOCYTES # BLD AUTO: 1.8 10E3/UL (ref 0.8–5.3)
LYMPHOCYTES NFR BLD AUTO: 23 %
MCH RBC QN AUTO: 32 PG (ref 26.5–33)
MCHC RBC AUTO-ENTMCNC: 33.8 G/DL (ref 31.5–36.5)
MCV RBC AUTO: 95 FL (ref 78–100)
MONOCYTES # BLD AUTO: 0.3 10E3/UL (ref 0–1.3)
MONOCYTES NFR BLD AUTO: 4 %
NEUTROPHILS # BLD AUTO: 5.6 10E3/UL (ref 1.6–8.3)
NEUTROPHILS NFR BLD AUTO: 71 %
PLATELET # BLD AUTO: 217 10E3/UL (ref 150–450)
PROT SERPL-MCNC: 7.1 G/DL (ref 6.4–8.3)
RBC # BLD AUTO: 4.56 10E6/UL (ref 3.8–5.2)
WBC # BLD AUTO: 7.8 10E3/UL (ref 4–11)

## 2022-09-09 PROCEDURE — 82947 ASSAY GLUCOSE BLOOD QUANT: CPT

## 2022-09-09 PROCEDURE — 82565 ASSAY OF CREATININE: CPT

## 2022-09-09 PROCEDURE — 80076 HEPATIC FUNCTION PANEL: CPT

## 2022-09-09 PROCEDURE — 86140 C-REACTIVE PROTEIN: CPT

## 2022-09-09 PROCEDURE — 85652 RBC SED RATE AUTOMATED: CPT

## 2022-09-09 PROCEDURE — 36415 COLL VENOUS BLD VENIPUNCTURE: CPT

## 2022-09-09 PROCEDURE — 85025 COMPLETE CBC W/AUTO DIFF WBC: CPT

## 2022-09-13 ENCOUNTER — VIRTUAL VISIT (OUTPATIENT)
Dept: RHEUMATOLOGY | Facility: CLINIC | Age: 37
End: 2022-09-13
Payer: COMMERCIAL

## 2022-09-13 DIAGNOSIS — Z79.899 HIGH RISK MEDICATION USE: ICD-10-CM

## 2022-09-13 DIAGNOSIS — M05.9 SEROPOSITIVE RHEUMATOID ARTHRITIS (H): Primary | ICD-10-CM

## 2022-09-13 PROCEDURE — 99214 OFFICE O/P EST MOD 30 MIN: CPT | Mod: 95 | Performed by: INTERNAL MEDICINE

## 2022-09-13 RX ORDER — FAMOTIDINE 20 MG
1000 TABLET ORAL DAILY
Qty: 90 CAPSULE | Refills: 12 | Status: SHIPPED | OUTPATIENT
Start: 2022-09-13

## 2022-09-13 RX ORDER — METHOTREXATE 2.5 MG/1
20 TABLET ORAL
Qty: 104 TABLET | Refills: 1 | Status: SHIPPED | OUTPATIENT
Start: 2022-09-13 | End: 2023-01-02

## 2022-09-13 NOTE — PROGRESS NOTES
Geovanna Schwartz  is a 36 year old year old who is being evaluated via a billable video visit.      How would you like to obtain your AVS? MyChart  If the video visit is dropped, the invitation should be resent by: Text to cell phone: [phone]   Will anyone else be joining your video visit? No     Rheumatology Video Visit      Geovanna Schwartz MRN# [id number]   YOB: 1985 Age: 36 year old      Date of visit: 9/13/22   PCP: Dr. Vicky Hu at [facility] ([facility])    Chief Complaint   Patient presents with:  Rheumatoid Arthritis    Assessment and Plan     1. Seropositive Nonerosive Rheumatoid Arthritis (.3, .8): Dx'd 2017.  Established care with me on 9/24/2020, at which time she had swelling of the bilateral second MCPs and wrists, and was on prednisone 2.5 mg daily.  Previously on HCQ (GI upset), SSZ (partially effective; she thought methotrexate was replacing sulfasalazine so stopped it previously when it was supposed to be combination therapy; she notes that she has a hard time taking twice daily medications). Was doing well on prednisone 2.5mg every other day and methotrexate 22.5 mg once weekly (started 5/18/2021), but hyperglycemia and had difficulty tapering off prednisone so increased MTX previously in anticipation of trying to taper prednisone again starting at this visit, but more fatigue that she correlates with the MTX dose increase. Joint swelling today. Discussed tx options; reduce MTX and start Humira.   Chronic illness, progressive.    - Reduce Methotrexate from 25mg once every 7 days, to be 20mg once every 7 days  - Continue folic acid 1mg daily  - Continue prednisone 2mg daily with plans to reduce in the future (next visit)  - Start Humira 40mg SQ every 14 days if labs and x-rays are okay  - chest x-ray now  - lab now: quantiferon TB gold plus  - Labs in 3 months: CBC, Creatinine, Hepatic Panel, ESR, CRP, glucose    High risk medication requiring  intensive toxicity monitoring at least quarterly: labs ordered include CBC, Creatinine, Hepatic panel to monitor for cytopenia and hepatotoxicity; checking creatinine as it affects clearance of medication.      # Adalimumab (Humira) Risks and Benefits: The risks and benefits of adalimumab were discussed in detail and the patient verbalized understanding.  The risks discussed include, but are not limited to, the risk for hypersensitivity, anaphylaxis, anaphylactoid reactions, an increased risk for serious infections leading to hospitalization or death, a possible increased risk for lymphoma and other malignancies, a possible worsening of demyelinating diseases, a possible worsening of heart failure, risk for cytopenias, risk for drug induced lupus, possible reactivation of hepatitis B, and possible reactivation of latent tuberculosis.  Subcutaneous injections may result in injection site reactions and/or pain at the site of injection.  The most common adverse reactions are infections, injection site reactions, headache, and rash.  It was discussed that the medication would need to be discontinued if a serious infection develops.  It was discussed that live vaccinations should not be received while using adalimumab or within 30 days prior to starting adalimumab.  I encouraged reviewing the package insert and asking any questions about the medication.      # Pregnancy planning: IUD; she verbalized understanding that DMARDs will need adjustment at least 3 months prior to conception    2. Low vitamin D: low in the past and responded to supplemental vitamin D.   - Continue vitamin D 1000 IU daily (OTC)    3.  Vaccinations: Vaccinations reviewed with Ms. Schwartz.  Risks and benefits of vaccinations were discussed.    - Influenza: encouraged yearly vaccination  - COVID-19: advised getting the updated bivalent COVID-19 vaccination   Based on our current understanding (and this may change over time as we learn more),  medications should be adjusted as noted below, if disease activity allows:  - NSAIDs and Acetaminophen: hold for 24 hours prior to vaccination if able to do so  - Methotrexate should be held for 1-2 weeks after the mRNA COVID-19 vaccine      Total minutes spent in evaluation with patient, documentation, , and review of pertinent studies and chart notes: 28     Ms. Schwartz verbalized agreement with and understanding of the rational for the diagnosis and treatment plan.  All questions were answered to best of my ability and the patient's satisfaction. Ms. Schwartz was advised to contact the clinic with any questions that may arise after the clinic visit.      Thank you for involving me in the care of the patient    Return to clinic: 3 - 4 months      HPI   Geovanna Schwartz is a 36 year old female with a past medical history significant for allergic rhinitis, tobacco use, and rheumatoid arthritis who is seen for follow-up of rheumatoid arthritis.    6/7/2019 [facility] rheumatology clinic note by Dr. Wade Weiner document seropositive rheumatoid arthritis on prednisone 2.5 mg daily and sulfasalazine 1000 mg twice daily    9/24/2020: Ms. Schwartz reports that she has RA.  On prednisone 2.5mg daily now and it helps.  Previously on SSZ 1000mg daily when she was following with [facility] rheumatology and this was effective but not completely controlling symptoms so she was going to increase to BID but then lost to follow-up due to insurance change. Wrists and 2nd MCPs are the most affected joints; intermittent pain and stiffness in these joints; worse in the AM.  If very swollen in these joints then she doesn't want to move them at all, but when able to move without much pain then she feels better and better with increased physical activity. Felt better when on SSZ but not complete control when on SSZ once daily; so she had just started SSZ BID dosing before changing insurance and then not having rheumatology  follow-up .  HCQ was used without much improvement and she had GI upset with it; started 3 years ago when first dx'd and breastfeeding.  Currently not planning to have additional children. Birth control with IUD.   Morning stiffness for >45 min.  Was on vitamin D but has stopped.     1/6/2021: Sulfasalazine has been beneficial but she still has active joint symptoms primarily in her wrists and second MCPs.  She states that her symptoms are much more tolerable and she has more better days than she had in the past but she still has these active symptoms.  Pregnancy plans are not yet determined with her  so she is going to have this conversation with him soon.    4/14/2021: Pain in her shoulders if she raises her arms above her head, worse with abduction.  Wrists, second MCPs and MTPs are achy, worse in the morning improves time and activity.  Morning stiffness for approximately 30-45 minutes.  She has discussed conception planning with her  and they are planning for no more children so she will continue with the IUD.  She states that she would like to start methotrexate.    7/14/2021: Mild pain at the right third MCP for the past 2 days without swelling or increased warmth.  Also mild pain at the left fifth MTP.  Both of these joints have improved with initiation of methotrexate, that was started on May 18, 2021.  Other joints are doing well.  Morning stiffness for about 30 minutes.  No gelling phenomenon.  Tolerating medications well.    11/16/2021: Geovanna Schwartz was unable to connect by video due to her internet connection that she suspects was poor due to it being windy outside and having satellite Internet; she wanted changed to telephone visit.  Doing well with regard to arthritis as long as she remains on prednisone.  She was able to stop prednisone 2.5 mg daily for about 2 weeks before her symptoms started to return so she will be started prednisone and has been doing well since that time.   She also informs me today that she is not taking sulfasalazine and has not been since she started methotrexate because she thought methotrexate was replacing sulfasalazine.  Currently without joint pain or swelling.  No morning stiffness or gelling phenomenon.    3/8/2022: Currently doing well.  A bit more achy since reducing prednisone from 2.5 mg daily, to 2.5 mg every other day and has been doing for so for more than 1 week now.  Tolerating methotrexate well.  Arthritis is not limiting her daily activities.  Morning stiffness for less than 20 minutes.    6/8/2022: achy when reducing prednisone to 2mg daily but is tolerable, mild. Morning stiffness <30 min. Tolerating medications well. Arthritis not limiting daily activities.     Today, 9/13/2022: more fatigue since increasing MTX to 25mg wily.  Joint pain at the MCPs and PIPs that is worse in the AM and improved with time and activity. Morning stiffness 20 min - 3 hours.      Denies fevers, chills, nausea, vomiting, constipation, diarrhea. No abdominal pain. No chest pain/pressure, palpitations, or shortness of breath. No LE swelling. No neck pain. No oral or nasal sores.  No rash. No sicca symptoms.     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 [employer]    ROS   12 point review of system was completed and negative except as noted in the HPI     Active Problem List     Patient Active Problem List   Diagnosis     Personal history of contraception, presenting hazards to health     Allergic rhinitis due to other allergen     Viral warts     Tobacco use disorder     Other acne     Female genital symptoms     Adjustment disorder with anxiety     CARDIOVASCULAR SCREENING; LDL GOAL LESS THAN 160     Seropositive rheumatoid arthritis (H)     Past Medical History     Past Medical History:   Diagnosis Date     Abnormal Pap smear of cervix     greater than 5 yrs ago; leep      Depressive disorder,  not elsewhere classified      Mental disorder     anxiety, depression     PAP SMEAR CERVIX W LGSIL 12/29/2006    Colpo HPV 6 and 16 DNA.-   RUTHANN 1 on of her cervical bx.   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 This is a more severe abnormality than indicated by the previous biopsies. I would recommend Pap smears as outlined. Please call. Alejandro Peter M.D. She will need follow-up paps at 4,8,12 months. If these are n     Past Surgical History     Past Surgical History:   Procedure Laterality Date     BIOPSY CERVICAL, LOCAL EXCISION, SINGLE/MULTIPLE       LEEP TX, CERVICAL  2/20/2007    RUTHANN I-II     OTHER SURGICAL HISTORY      tonsil     SURGICAL HISTORY OF -   6/22/2004    Left wrist arthroscopy plus triangular      WRIST SURGERY      left     Allergy     Allergies   Allergen Reactions     Hydroxychloroquine Other (See Comments)     Stomach upset, diarrhea     Current Medication List     Current Outpatient Medications   Medication Sig     citalopram (CELEXA) 20 MG tablet TAKE ONE AND ONE-HALF TABLETS BY MOUTH EVERY DAY     diclofenac (VOLTAREN) 1 % topical gel Apply up to 2 grams of 1% gel to hands up to 4 times daily as needed for joint pain (maximum: 8 g per upper extremity per day)     folic acid (FOLVITE) 1 MG tablet Take 1 tablet (1 mg) by mouth daily     hydrOXYzine (VISTARIL) 25 MG capsule Take 1 capsule (25 mg) by mouth 3 times daily as needed for anxiety      MG OR TABS Take by mouth 3 times daily as needed      methotrexate sodium 2.5 MG TABS Take 10 tablets (25 mg) by mouth every 7 days .  Take all 10 tablets within the same 24 hour period of each week.     predniSONE (DELTASONE) 1 MG tablet Take 2 tablets (2 mg) by mouth daily     VITAMIN D PO      BUPROPION HCL# 300 MG OR TB24 1 TABLET DAILY (Patient not taking: No sig reported)     CELEXA 10 MG OR TABS 30 mg as of 9/23/2020     LORATADINE 10 MG OR TABS  "ONE DAILY (Patient not taking: No sig reported)     SPRINTEC 28 0.25-35 MG-MCG OR TABS 1 TABLET DAILY (Patient not taking: No sig reported)     No current facility-administered medications for this visit.         Social History   See HPI    Family History     Family History   Problem Relation Age of Onset     Hypertension Father      Allergies Father      Allergies Brother      Allergies Sister      Depression Mother      Depression Father      Hyperlipidemia Father      Pancreatic Cancer Paternal Aunt      Physical Exam     Temp Readings from Last 3 Encounters:   04/17/09 97.8  F (36.6  C) (Tympanic)   12/13/05 98.7  F (37.1  C) (Oral)   11/14/05 98.5  F (36.9  C) (Oral)     BP Readings from Last 5 Encounters:   06/08/22 120/79   04/17/09 110/60   12/17/08 130/68   03/27/08 118/78   01/10/08 120/74     Pulse Readings from Last 1 Encounters:   06/08/22 75     Resp Readings from Last 1 Encounters:   No data found for Resp     Estimated body mass index is 23.93 kg/m  as calculated from the following:    Height as of 11/23/18: 1.689 m (5' 6.5\").    Weight as of 11/23/18: 68.3 kg (150 lb 8 oz).      GEN: NAD. Healthy appearing adult.   HEENT: MMM.  Anicteric, noninjected sclera. No obvious external lesions of the ear and nose. Hearing intact.  PULM: No increased work of breathing  MSK:  Mild swelling of the right 3rd MCP and left 2nd MCP  SKIN: No rash or jaundice seen  PSYCH: Alert. Appropriate.           Labs / Imaging (select studies)     CBC  Recent Labs   Lab Test 09/09/22  1122 07/27/22  1044 05/31/22  1452 07/30/21  1334 06/24/21  1141 01/12/21  1350 09/28/20  1332   WBC 7.8 6.7 6.9   < > 6.5 8.5 7.9   RBC 4.56 4.28 4.51   < > 4.60 4.55 4.58   HGB 14.6 13.6 14.2   < > 14.5 14.4 14.3   HCT 43.2 40.3 42.4   < > 41.8 42.7 41.8   MCV 95 94 94   < > 91 94 91   RDW 13.1 12.9 12.0   < > 12.1 11.4 11.6    217 225   < > 192 205 198   MCH 32.0 31.8 31.5   < > 31.5 31.6 31.2   MCHC 33.8 33.7 33.5   < > 34.7 33.7 34.2 "   NEUTROPHIL 71 61 62   < > 61.7 63.0 58.6   LYMPH 23 26 29   < > 28.8 26.3 27.4   MONOCYTE 4 10 7   < > 7.8 10.7 11.6   EOSINOPHIL 1 2 2   < > 1.4 0.0 1.9   BASOPHIL 0 1 0   < > 0.3 0.0 0.5   ANEU  --   --   --   --  4.0 5.4 4.6   ALYM  --   --   --   --  1.9 2.2 2.2   MARY  --   --   --   --  0.5 0.9 0.9   AEOS  --   --   --   --  0.1 0.0 0.2   ABAS  --   --   --   --  0.0 0.0 0.0   ANEUTAUTO 5.6 4.1 4.3   < >  --   --   --    ALYMPAUTO 1.8 1.8 2.0   < >  --   --   --    AMONOAUTO 0.3 0.7 0.5   < >  --   --   --    AEOSAUTO 0.1 0.1 0.1   < >  --   --   --    ABSBASO 0.0 0.0 0.0   < >  --   --   --     < > = values in this interval not displayed.     CMP  Recent Labs   Lab Test 09/09/22  1122 07/27/22  1044 05/31/22  1452 07/30/21  1334 06/24/21  1141 01/12/21  1350 09/28/20  1332   * 82 100*   < >  --  88 83   CR 0.78 1.08* 0.82   < > 0.78 0.77 0.79   GFRESTIMATED >90 68 >90   < > >90 >90 >90   GFRESTBLACK  --   --   --   --  >90 >90 >90   HA  --   --   --   --   --  9.1  --    BILITOTAL 0.9 0.9 0.8   < > 0.9 0.6 0.4   ALBUMIN 4.5 3.9 4.0   < > 4.0 4.2 3.8   PROTTOTAL 7.1 7.5 7.6   < > 7.4 8.2 7.5   ALKPHOS 58 52 58   < > 63 56 68   AST 30 17 16   < > 15 14 19   ALT 30 23 21   < > 22 15 18    < > = values in this interval not displayed.     Calcium/VitaminD  Recent Labs   Lab Test 01/12/21  1350   HA 9.1   VITDT 38     ESR/CRP  Recent Labs   Lab Test 09/09/22  1122 07/27/22  1044 05/31/22  1452   SED 5 5 4   CRP <3.00 3.3 <2.9     Hepatitis B  Recent Labs   Lab Test 09/28/20  1332 02/28/17  1201   HBCAB Nonreactive  --    HEPBANG Nonreactive Negative     Hepatitis C  Recent Labs   Lab Test 09/28/20  1332 01/04/18  1039   HCVAB Nonreactive Negative     Lyme ab screening  Recent Labs   Lab Test 09/28/20  1332   LYMEGM 0.08     Tuberculosis Screening  Recent Labs   Lab Test 04/01/22  1427 02/23/21  1112 01/12/21  1350   TBRES Negative  --   --    TBRST  --  Negative Positive*     HIV Screening  Recent Labs    Lab Test 09/28/20  1332 02/28/17  1201   HIAGAB Nonreactive Negative       [facility] Labs reviewed in CareEverywhere:  11/27/2017: .3, .8  1/4/2018: HCV ab negative, SABRINA negative    Immunization History     Immunization History   Administered Date(s) Administered     COVID-19,PF,Pfizer (12+ Yrs) 01/08/2021, 01/28/2021, 11/11/2021     HPV 01/09/2007, 03/20/2007, 03/27/2008          Chart documentation done in part with Dragon Voice recognition Software. Although reviewed after completion, some word and grammatical error may remain.        Video-Visit Details    Type of service:  Video Visit    Video Start Time: 10:00 AM    Video End Time:10:25 AM    Originating Location (pt. Location): [place], [place]    Distant Location (provider location):  [place]    Platform used for Video Visit: Chivo Toussaint MD

## 2022-09-13 NOTE — PATIENT INSTRUCTIONS
RHEUMATOLOGY    Dr. Toy Toussaint    [facility]  [address] [place] [address]  [place] [address]  Phone number: [phone]  Fax number: [phone]    You may schedule your FLU shot by calling [phone] or if you would like to get your shot at a [facility] pharmacy you may schedule online at [email or web address].    Thank you for choosing [facility]!    Sheyla Stover CMA Rheumatology

## 2022-09-22 ENCOUNTER — HOSPITAL ENCOUNTER (OUTPATIENT)
Dept: GENERAL RADIOLOGY | Facility: CLINIC | Age: 37
Discharge: HOME | End: 2022-09-22
Attending: INTERNAL MEDICINE | Admitting: INTERNAL MEDICINE
Payer: COMMERCIAL

## 2022-09-22 DIAGNOSIS — Z79.899 HIGH RISK MEDICATION USE: ICD-10-CM

## 2022-09-22 DIAGNOSIS — M05.9 SEROPOSITIVE RHEUMATOID ARTHRITIS (H): ICD-10-CM

## 2022-09-22 PROCEDURE — 71046 X-RAY EXAM CHEST 2 VIEWS: CPT

## 2022-10-14 ENCOUNTER — TELEPHONE (OUTPATIENT)
Dept: RHEUMATOLOGY | Facility: CLINIC | Age: 37
End: 2022-10-14

## 2022-10-14 NOTE — TELEPHONE ENCOUNTER
Informed patient that quantiferon gold test is still needed before humira can be ordered. Pt stated understanding that it needs to be done    Dania WHITE RN Specialty Triage 10/14/2022 3:22 PM

## 2022-10-14 NOTE — TELEPHONE ENCOUNTER
RN: please call Geovanna Schwartz. Waiting on the tuberculosis blood test before humira can be prescribed.     Toy Toussaint MD  10/14/2022

## 2022-10-26 ENCOUNTER — LAB (OUTPATIENT)
Dept: LAB | Facility: CLINIC | Age: 37
End: 2022-10-26
Payer: COMMERCIAL

## 2022-10-26 DIAGNOSIS — M05.9 SEROPOSITIVE RHEUMATOID ARTHRITIS (H): ICD-10-CM

## 2022-10-26 DIAGNOSIS — Z79.899 HIGH RISK MEDICATION USE: ICD-10-CM

## 2022-10-26 LAB
ALBUMIN SERPL BCG-MCNC: 4.3 G/DL (ref 3.5–5.2)
ALP SERPL-CCNC: 58 U/L (ref 35–104)
ALT SERPL W P-5'-P-CCNC: 17 U/L (ref 10–35)
AST SERPL W P-5'-P-CCNC: 15 U/L (ref 10–35)
BASOPHILS # BLD AUTO: 0 10E3/UL (ref 0–0.2)
BASOPHILS NFR BLD AUTO: 0 %
BILIRUB DIRECT SERPL-MCNC: <0.2 MG/DL (ref 0–0.3)
BILIRUB SERPL-MCNC: 0.4 MG/DL
CREAT SERPL-MCNC: 0.76 MG/DL (ref 0.51–0.95)
CRP SERPL-MCNC: <3 MG/L
EOSINOPHIL # BLD AUTO: 0.1 10E3/UL (ref 0–0.7)
EOSINOPHIL NFR BLD AUTO: 2 %
ERYTHROCYTE [DISTWIDTH] IN BLOOD BY AUTOMATED COUNT: 12.3 % (ref 10–15)
ERYTHROCYTE [SEDIMENTATION RATE] IN BLOOD BY WESTERGREN METHOD: 6 MM/HR (ref 0–20)
FASTING STATUS PATIENT QL REPORTED: YES
GFR SERPL CREATININE-BSD FRML MDRD: >90 ML/MIN/1.73M2
GLUCOSE SERPL-MCNC: 87 MG/DL (ref 70–99)
HCT VFR BLD AUTO: 39.4 % (ref 35–47)
HGB BLD-MCNC: 13.4 G/DL (ref 11.7–15.7)
IMM GRANULOCYTES # BLD: 0 10E3/UL
IMM GRANULOCYTES NFR BLD: 0 %
LYMPHOCYTES # BLD AUTO: 1.8 10E3/UL (ref 0.8–5.3)
LYMPHOCYTES NFR BLD AUTO: 25 %
MCH RBC QN AUTO: 32.2 PG (ref 26.5–33)
MCHC RBC AUTO-ENTMCNC: 34 G/DL (ref 31.5–36.5)
MCV RBC AUTO: 95 FL (ref 78–100)
MONOCYTES # BLD AUTO: 0.6 10E3/UL (ref 0–1.3)
MONOCYTES NFR BLD AUTO: 9 %
NEUTROPHILS # BLD AUTO: 4.5 10E3/UL (ref 1.6–8.3)
NEUTROPHILS NFR BLD AUTO: 63 %
PLATELET # BLD AUTO: 206 10E3/UL (ref 150–450)
PROT SERPL-MCNC: 6.9 G/DL (ref 6.4–8.3)
RBC # BLD AUTO: 4.16 10E6/UL (ref 3.8–5.2)
WBC # BLD AUTO: 7.2 10E3/UL (ref 4–11)

## 2022-10-26 PROCEDURE — 86140 C-REACTIVE PROTEIN: CPT

## 2022-10-26 PROCEDURE — 80076 HEPATIC FUNCTION PANEL: CPT

## 2022-10-26 PROCEDURE — 85025 COMPLETE CBC W/AUTO DIFF WBC: CPT

## 2022-10-26 PROCEDURE — 86481 TB AG RESPONSE T-CELL SUSP: CPT

## 2022-10-26 PROCEDURE — 36415 COLL VENOUS BLD VENIPUNCTURE: CPT

## 2022-10-26 PROCEDURE — 82947 ASSAY GLUCOSE BLOOD QUANT: CPT

## 2022-10-26 PROCEDURE — 85652 RBC SED RATE AUTOMATED: CPT

## 2022-10-26 PROCEDURE — 82565 ASSAY OF CREATININE: CPT

## 2022-10-28 LAB
GAMMA INTERFERON BACKGROUND BLD IA-ACNC: 0.06 IU/ML
M TB IFN-G BLD-IMP: NEGATIVE
M TB IFN-G CD4+ BCKGRND COR BLD-ACNC: 9.94 IU/ML
MITOGEN IGNF BCKGRD COR BLD-ACNC: 0.16 IU/ML
MITOGEN IGNF BCKGRD COR BLD-ACNC: 0.23 IU/ML
QUANTIFERON MITOGEN: 10 IU/ML
QUANTIFERON NIL TUBE: 0.06 IU/ML
QUANTIFERON TB1 TUBE: 0.29 IU/ML
QUANTIFERON TB2 TUBE: 0.22

## 2022-11-03 ENCOUNTER — TELEPHONE (OUTPATIENT)
Dept: RHEUMATOLOGY | Facility: CLINIC | Age: 37
End: 2022-11-03

## 2022-11-03 NOTE — TELEPHONE ENCOUNTER
Prior Authorization Approval    Authorization Effective Date: 11/3/2022  Authorization Expiration Date: 5/2/2023  Medication: Humira PA Approved  Approved Dose/Quantity: 2 pens per 28 days  Reference #: [id number]   Insurance Company: [employer] - Phone [phone] Fax [phone]  Expected CoPay:       CoPay Card Available:      Foundation Assistance Needed:    Which Pharmacy is filling the prescription (Not needed for infusion/clinic administered): [facility] - [place] [address]  Pharmacy Notified:    Patient Notified:    Yes, via [facility] [email or web address]  Phone: [phone]  Fax: [phone]

## 2022-11-03 NOTE — TELEPHONE ENCOUNTER
PA Initiation    Medication: Humira PA Initiated  Insurance Company: [employer] - Phone [phone] Fax [phone]  Pharmacy Filling the Rx: [facility] - [place], [place] - [facility] [address]  Filling Pharmacy Phone:    Filling Pharmacy Fax:    Start Date: 11/3/2022    [facility] [email or web address]  Phone: [phone]  Fax: [phone]

## 2022-12-04 ENCOUNTER — OFFICE VISIT (OUTPATIENT)
Dept: URGENT CARE | Facility: URGENT CARE | Age: 37
End: 2022-12-04
Payer: COMMERCIAL

## 2022-12-04 VITALS
OXYGEN SATURATION: 100 % | HEART RATE: 91 BPM | TEMPERATURE: 98.1 F | DIASTOLIC BLOOD PRESSURE: 83 MMHG | BODY MASS INDEX: 24.01 KG/M2 | WEIGHT: 151 LBS | SYSTOLIC BLOOD PRESSURE: 118 MMHG

## 2022-12-04 DIAGNOSIS — H65.92 OME (OTITIS MEDIA WITH EFFUSION), LEFT: Primary | ICD-10-CM

## 2022-12-04 DIAGNOSIS — J01.80 ACUTE NON-RECURRENT SINUSITIS OF OTHER SINUS: ICD-10-CM

## 2022-12-04 PROCEDURE — 99213 OFFICE O/P EST LOW 20 MIN: CPT | Performed by: EMERGENCY MEDICINE

## 2022-12-04 NOTE — PATIENT INSTRUCTIONS
Augmentin as instructed  Tylenol or ibuprofen as needed  Follow-up 5 to 7 days if symptoms persist

## 2022-12-04 NOTE — PROGRESS NOTES
CHIEF COMPLAINT: Left ear pain.  Possible sinus infection      HPI: Patient is a 37-year-old female whose been ill for 7 or 8 days with presumed id influenza.  Her sinus symptoms have not improved with tenacious rhinorrhea and facial pressure.  She is also had a several day history of left ear pain and decreased hearing.  No drainage.      ROS: See HPI otherwise normal.    Allergies   Allergen Reactions     Hydroxychloroquine Other (See Comments)     Stomach upset, diarrhea      Current Outpatient Medications   Medication Sig Dispense Refill     adalimumab (HUMIRA *CF*) 40 MG/0.4ML pen kit Inject 0.4 mLs (40 mg) Subcutaneous every 14 days . Hold for signs of infection, then seek medical attention. 0.8 mL 4     citalopram (CELEXA) 20 MG tablet TAKE ONE AND ONE-HALF TABLETS BY MOUTH EVERY DAY 45 tablet 12     diclofenac (VOLTAREN) 1 % topical gel Apply up to 2 grams of 1% gel to hands up to 4 times daily as needed for joint pain (maximum: 8 g per upper extremity per day) 200 g 1     folic acid (FOLVITE) 1 MG tablet Take 1 tablet (1 mg) by mouth daily 90 tablet 3     hydrOXYzine (VISTARIL) 25 MG capsule Take 1 capsule (25 mg) by mouth 3 times daily as needed for anxiety 30 capsule 3     methotrexate sodium 2.5 MG TABS Take 8 tablets (20 mg) by mouth every 7 days .  Take all 8 tablets within the same 24 hour period of each week. 104 tablet 1     predniSONE (DELTASONE) 1 MG tablet Take 2 tablets (2 mg) by mouth daily 180 tablet 1     Vitamin D, Cholecalciferol, 25 MCG (1000 UT) CAPS Take 1,000 Units by mouth daily 90 capsule 12     amoxicillin-clavulanate (AUGMENTIN) 875-125 MG tablet Take 1 tablet by mouth 2 times daily for 10 days 20 tablet 0     BUPROPION HCL# 300 MG OR TB24 1 TABLET DAILY (Patient not taking: No sig reported) 35 1 year     CELEXA 10 MG OR TABS 30 mg as of 9/23/2020 30 1      MG OR TABS Take by mouth 3 times daily as needed  (Patient not taking: Reported on 12/4/2022)       LORATADINE 10 MG OR  TABS ONE DAILY (Patient not taking: No sig reported) 30 1 YEAR     SPRINTEC 28 0.25-35 MG-MCG OR TABS 1 TABLET DAILY (Patient not taking: No sig reported) 28 1 year     VITAMIN D PO  (Patient not taking: Reported on 12/4/2022)           PE: Physical exam reveals a 37-year-old female to be no acute distress.  Vital signs are normal including temp 98.1.  HEENT reveals her left ear to be nontender externally.  Her TM is erythematous and dull and retracted but intact.  Right ear structures are normal.  Oropharynx reveals mild injection.        TREATMENT: None.      ASSESSMENT: Left otitis media, sinusitis both as sequelae to viral URI      DIAGNOSIS: Left otitis media.  Acute sinusitis.      PLAN: Augmentin as instructed.  Follow-up 5 to 7 days if symptoms do not improve

## 2022-12-29 DIAGNOSIS — Z79.899 HIGH RISK MEDICATION USE: ICD-10-CM

## 2022-12-29 DIAGNOSIS — M05.9 SEROPOSITIVE RHEUMATOID ARTHRITIS (H): ICD-10-CM

## 2023-01-02 ENCOUNTER — VIRTUAL VISIT (OUTPATIENT)
Dept: RHEUMATOLOGY | Facility: CLINIC | Age: 38
End: 2023-01-02
Payer: COMMERCIAL

## 2023-01-02 DIAGNOSIS — Z79.899 HIGH RISK MEDICATION USE: ICD-10-CM

## 2023-01-02 DIAGNOSIS — M05.9 SEROPOSITIVE RHEUMATOID ARTHRITIS (H): Primary | ICD-10-CM

## 2023-01-02 PROCEDURE — 99214 OFFICE O/P EST MOD 30 MIN: CPT | Mod: 95 | Performed by: INTERNAL MEDICINE

## 2023-01-02 RX ORDER — FOLIC ACID 1 MG/1
1 TABLET ORAL DAILY
Qty: 90 TABLET | Refills: 2 | Status: SHIPPED | OUTPATIENT
Start: 2023-01-02 | End: 2023-09-15

## 2023-01-02 RX ORDER — PREDNISONE 1 MG/1
2 TABLET ORAL DAILY
Qty: 180 TABLET | Refills: 1 | Status: SHIPPED | OUTPATIENT
Start: 2023-01-02 | End: 2023-04-28

## 2023-01-02 RX ORDER — METHOTREXATE 2.5 MG/1
20 TABLET ORAL
Qty: 104 TABLET | Refills: 2 | Status: SHIPPED | OUTPATIENT
Start: 2023-01-02 | End: 2023-09-15

## 2023-01-02 NOTE — PATIENT INSTRUCTIONS
RHEUMATOLOGY    Dr. Toy Toussaint    [facility]  [address] [place] [address]  [place] [address]  Phone number: [phone]  Fax number: [phone]    You may schedule your FLU shot by calling [phone] or if you would like to get your shot at a [facility] pharmacy you may schedule online at [email or web address].    Thank you for choosing [facility]!    Sheyla Stover CMA Rheumatology

## 2023-01-02 NOTE — PROGRESS NOTES
Geovanna Schwartz  is a 37 year old year old who is being evaluated via a billable video visit.      How would you like to obtain your AVS? MyChart  If the video visit is dropped, the invitation should be resent by: Text to cell phone: [phone]   Will anyone else be joining your video visit? No     Rheumatology Video Visit      Geovanna Shcwartz MRN# [id number]   YOB: 1985 Age: 37 year old      Date of visit: 1/02/23   PCP: Dr. Vicky Hu at [facility] ([facility])    Chief Complaint   Patient presents with:  Rheumatoid Arthritis: Tested positive for covid on Friday    Assessment and Plan     1. Seropositive Nonerosive Rheumatoid Arthritis (.3, .8): Dx'd 2017.  Established care with me on 9/24/2020, at which time she had swelling of the bilateral second MCPs and wrists, and was on prednisone 2.5 mg daily.  Previously on HCQ (GI upset), SSZ (partially effective; she thought methotrexate was replacing sulfasalazine so stopped it previously when it was supposed to be combination therapy; she notes that she has a hard time taking twice daily medications). Was doing well on prednisone 2.5mg every other day and methotrexate 22.5 mg once weekly (started 5/18/2021), but hyperglycemia and had difficulty tapering off prednisone; higher dose of methotrexate was associated with fatigue.  Currently doing well on a combination of Humira 40 mg SQ every 14 days, methotrexate 20 mg once weekly, and prednisone 2 mg daily.  Has not taken Humira consistently because of flu, and now COVID-19 infection.  She is holding DMARDs because of COVID-19; she says that she tested positive for COVID-19 infection only because other family members were symptomatic and she now has only mild symptoms.  Discussed the option for Paxlovid.  She says that she will monitor her symptoms for now.  Holding medications while ill.  Hopefully a follow-up in April prednisone will be able to be reduced at that  time, after being on DMARD for a more consistent period of time.  Overall joints are doing well at this time.   Chronic illness, stable.    - Continue Methotrexate 20mg oral once every 7 days  - Continue folic acid 1mg daily  - Continue prednisone 2mg daily with plans to reduce in the future (next visit)  - Continue Humira 40mg SQ every 14 days, after infection has resolved  - Labs every 8-12 weeks: CBC, Creatinine, Hepatic Panel, ESR, CRP, glucose    High risk medication requiring intensive toxicity monitoring at least quarterly: labs ordered include CBC, Creatinine, Hepatic panel to monitor for cytopenia and hepatotoxicity; checking creatinine as it affects clearance of medication.      # Pregnancy planning: IUD; she verbalized understanding that DMARDs will need adjustment at least 3 months prior to conception    2. Low vitamin D: Doing well with vitamin D supplementation.  - Continue vitamin D 1000 IU daily (OTC)    3.  Vaccinations: Vaccinations reviewed with Ms. Schwartz.  Risks and benefits of vaccinations were discussed.    - Influenza: encouraged yearly vaccination  - COVID-19: Advised updating the COVID-19 vaccination after all COVID-19 infection symptoms have resolved    Total minutes spent in evaluation with patient, documentation, , and review of pertinent studies and chart notes: 18     Ms. Schwartz verbalized agreement with and understanding of the rational for the diagnosis and treatment plan.  All questions were answered to best of my ability and the patient's satisfaction. Ms. Schwartz was advised to contact the clinic with any questions that may arise after the clinic visit.      Thank you for involving me in the care of the patient    Return to clinic: 3 - 4 months      HPI   Geovanna Schwartz is a 37 year old female with a past medical history significant for allergic rhinitis, tobacco use, and rheumatoid arthritis who is seen for follow-up of rheumatoid  arthritis.    6/7/2019 [facility] rheumatology clinic note by Dr. Wade Weiner document seropositive rheumatoid arthritis on prednisone 2.5 mg daily and sulfasalazine 1000 mg twice daily    9/24/2020: Ms. Schwartz reports that she has RA.  On prednisone 2.5mg daily now and it helps.  Previously on SSZ 1000mg daily when she was following with [facility] rheumatology and this was effective but not completely controlling symptoms so she was going to increase to BID but then lost to follow-up due to insurance change. Wrists and 2nd MCPs are the most affected joints; intermittent pain and stiffness in these joints; worse in the AM.  If very swollen in these joints then she doesn't want to move them at all, but when able to move without much pain then she feels better and better with increased physical activity. Felt better when on SSZ but not complete control when on SSZ once daily; so she had just started SSZ BID dosing before changing insurance and then not having rheumatology follow-up .  HCQ was used without much improvement and she had GI upset with it; started 3 years ago when first dx'd and breastfeeding.  Currently not planning to have additional children. Birth control with IUD.   Morning stiffness for >45 min.  Was on vitamin D but has stopped.     1/6/2021: Sulfasalazine has been beneficial but she still has active joint symptoms primarily in her wrists and second MCPs.  She states that her symptoms are much more tolerable and she has more better days than she had in the past but she still has these active symptoms.  Pregnancy plans are not yet determined with her  so she is going to have this conversation with him soon.    4/14/2021: Pain in her shoulders if she raises her arms above her head, worse with abduction.  Wrists, second MCPs and MTPs are achy, worse in the morning improves time and activity.  Morning stiffness for approximately 30-45 minutes.  She has discussed conception planning with her   and they are planning for no more children so she will continue with the IUD.  She states that she would like to start methotrexate.    7/14/2021: Mild pain at the right third MCP for the past 2 days without swelling or increased warmth.  Also mild pain at the left fifth MTP.  Both of these joints have improved with initiation of methotrexate, that was started on May 18, 2021.  Other joints are doing well.  Morning stiffness for about 30 minutes.  No gelling phenomenon.  Tolerating medications well.    11/16/2021: Geovanna Schwartz was unable to connect by video due to her internet connection that she suspects was poor due to it being windy outside and having satellite Internet; she wanted changed to telephone visit.  Doing well with regard to arthritis as long as she remains on prednisone.  She was able to stop prednisone 2.5 mg daily for about 2 weeks before her symptoms started to return so she will be started prednisone and has been doing well since that time.  She also informs me today that she is not taking sulfasalazine and has not been since she started methotrexate because she thought methotrexate was replacing sulfasalazine.  Currently without joint pain or swelling.  No morning stiffness or gelling phenomenon.    3/8/2022: Currently doing well.  A bit more achy since reducing prednisone from 2.5 mg daily, to 2.5 mg every other day and has been doing for so for more than 1 week now.  Tolerating methotrexate well.  Arthritis is not limiting her daily activities.  Morning stiffness for less than 20 minutes.    6/8/2022: achy when reducing prednisone to 2mg daily but is tolerable, mild. Morning stiffness <30 min. Tolerating medications well. Arthritis not limiting daily activities.     9/13/2022: more fatigue since increasing MTX to 25mg wily.  Joint pain at the MCPs and PIPs that is worse in the AM and improved with time and activity. Morning stiffness 20 min - 3 hours.      Today, 1/2/2023: Taking  methotrexate once weekly, folic acid daily, prednisone 2 mg daily, and is taken about 3 doses of Humira.  Feels like her arthritis is doing well at this time.  Humira has not been taken regularly because her whole family had a very significant flu infection, and now her family has COVID-19 infection.  She says that she tested positive for COVID-19 infection on Friday, testing only because her family had COVID-19 infection, but she did not have any symptoms until just recently and they are very mild nasal congestion symptoms so she prefers to monitor without treatment at this time.  No fevers or chills.    Denies fevers, chills, nausea, vomiting, constipation, diarrhea. No abdominal pain. No chest pain/pressure, palpitations, or shortness of breath. No LE swelling. No neck pain. No oral or nasal sores.  No rash. No sicca symptoms.     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[facility] [employer]    ROS   12 point review of system was completed and negative except as noted in the HPI     Active Problem List     Patient Active Problem List   Diagnosis     Personal history of contraception, presenting hazards to health     Allergic rhinitis due to other allergen     Viral warts     Tobacco use disorder     Other acne     Female genital symptoms     Adjustment disorder with anxiety     CARDIOVASCULAR SCREENING; LDL GOAL LESS THAN 160     Seropositive rheumatoid arthritis (H)     Past Medical History     Past Medical History:   Diagnosis Date     Abnormal Pap smear of cervix     greater than 5 yrs ago; leep      Depressive disorder, not elsewhere classified      Mental disorder     anxiety, depression     PAP SMEAR CERVIX W LGSIL 12/29/2006    Colpo HPV 6 and 16 DNA.-   RUTHANN 1 on of her cervical bx.   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 This is a more severe  abnormality than indicated by the previous biopsies. I would recommend Pap smears as outlined. Please call. Alejandro Peter M.D. She will need follow-up paps at 4,8,12 months. If these are n     Past Surgical History     Past Surgical History:   Procedure Laterality Date     BIOPSY CERVICAL, LOCAL EXCISION, SINGLE/MULTIPLE       LEEP TX, CERVICAL  2/20/2007    RUTHANN I-II     OTHER SURGICAL HISTORY      tonsil     SURGICAL HISTORY OF -   6/22/2004    Left wrist arthroscopy plus triangular      WRIST SURGERY      left     Allergy     Allergies   Allergen Reactions     Hydroxychloroquine Other (See Comments)     Stomach upset, diarrhea     Current Medication List     Current Outpatient Medications   Medication Sig     adalimumab (HUMIRA *CF*) 40 MG/0.4ML pen kit Inject 0.4 mLs (40 mg) Subcutaneous every 14 days . Hold for signs of infection, then seek medical attention.     citalopram (CELEXA) 20 MG tablet TAKE ONE AND ONE-HALF TABLETS BY MOUTH EVERY DAY     diclofenac (VOLTAREN) 1 % topical gel Apply up to 2 grams of 1% gel to hands up to 4 times daily as needed for joint pain (maximum: 8 g per upper extremity per day)     folic acid (FOLVITE) 1 MG tablet Take 1 tablet (1 mg) by mouth daily     hydrOXYzine (VISTARIL) 25 MG capsule Take 1 capsule (25 mg) by mouth 3 times daily as needed for anxiety     methotrexate sodium 2.5 MG TABS Take 8 tablets (20 mg) by mouth every 7 days .  Take all 8 tablets within the same 24 hour period of each week.     predniSONE (DELTASONE) 1 MG tablet Take 2 tablets (2 mg) by mouth daily     Vitamin D, Cholecalciferol, 25 MCG (1000 UT) CAPS Take 1,000 Units by mouth daily     BUPROPION HCL# 300 MG OR TB24 1 TABLET DAILY (Patient not taking: No sig reported)     CELEXA 10 MG OR TABS 30 mg as of 9/23/2020      MG OR TABS Take by mouth 3 times daily as needed  (Patient not taking: Reported on 12/4/2022)     LORATADINE 10 MG OR TABS ONE DAILY (Patient not taking: No sig reported)      "SPRINTEC 28 0.25-35 MG-MCG OR TABS 1 TABLET DAILY (Patient not taking: No sig reported)     VITAMIN D PO  (Patient not taking: Reported on 12/4/2022)     No current facility-administered medications for this visit.         Social History   See HPI    Family History     Family History   Problem Relation Age of Onset     Hypertension Father      Allergies Father      Allergies Brother      Allergies Sister      Depression Mother      Depression Father      Hyperlipidemia Father      Pancreatic Cancer Paternal Aunt      Physical Exam     Temp Readings from Last 3 Encounters:   12/04/22 98.1  F (36.7  C) (Tympanic)   04/17/09 97.8  F (36.6  C) (Tympanic)   12/13/05 98.7  F (37.1  C) (Oral)     BP Readings from Last 5 Encounters:   12/04/22 118/83   06/08/22 120/79   04/17/09 110/60   12/17/08 130/68   03/27/08 118/78     Pulse Readings from Last 1 Encounters:   12/04/22 91     Resp Readings from Last 1 Encounters:   No data found for Resp     Estimated body mass index is 24.01 kg/m  as calculated from the following:    Height as of 11/23/18: 1.689 m (5' 6.5\").    Weight as of 12/4/22: 68.5 kg (151 lb).    GEN: NAD.  HEENT:  Anicteric, noninjected sclera. No obvious external lesions of the ear and nose. Hearing intact.  PULM: No increased work of breathing  MSK:  Hands and wrists without swelling.    SKIN: No rash or jaundice seen  PSYCH: Alert. Appropriate.      Labs / Imaging (select studies)       CBC  Recent Labs   Lab Test 10/26/22  0952 09/09/22  1122 07/27/22  1044 07/30/21  1334 06/24/21  1141 01/12/21  1350 09/28/20  1332   WBC 7.2 7.8 6.7   < > 6.5 8.5 7.9   RBC 4.16 4.56 4.28   < > 4.60 4.55 4.58   HGB 13.4 14.6 13.6   < > 14.5 14.4 14.3   HCT 39.4 43.2 40.3   < > 41.8 42.7 41.8   MCV 95 95 94   < > 91 94 91   RDW 12.3 13.1 12.9   < > 12.1 11.4 11.6    217 217   < > 192 205 198   MCH 32.2 32.0 31.8   < > 31.5 31.6 31.2   MCHC 34.0 33.8 33.7   < > 34.7 33.7 34.2   NEUTROPHIL 63 71 61   < > 61.7 63.0 58.6 "   LYMPH 25 23 26   < > 28.8 26.3 27.4   MONOCYTE 9 4 10   < > 7.8 10.7 11.6   EOSINOPHIL 2 1 2   < > 1.4 0.0 1.9   BASOPHIL 0 0 1   < > 0.3 0.0 0.5   ANEU  --   --   --   --  4.0 5.4 4.6   ALYM  --   --   --   --  1.9 2.2 2.2   MARY  --   --   --   --  0.5 0.9 0.9   AEOS  --   --   --   --  0.1 0.0 0.2   ABAS  --   --   --   --  0.0 0.0 0.0   ANEUTAUTO 4.5 5.6 4.1   < >  --   --   --    ALYMPAUTO 1.8 1.8 1.8   < >  --   --   --    AMONOAUTO 0.6 0.3 0.7   < >  --   --   --    AEOSAUTO 0.1 0.1 0.1   < >  --   --   --    ABSBASO 0.0 0.0 0.0   < >  --   --   --     < > = values in this interval not displayed.     CMP  Recent Labs   Lab Test 10/26/22  0952 09/09/22  1122 07/27/22  1044 07/30/21  1334 06/24/21  1141 01/12/21  1350 09/28/20  1332   GLC 87 116* 82   < >  --  88 83   CR 0.76 0.78 1.08*   < > 0.78 0.77 0.79   GFRESTIMATED >90 >90 68   < > >90 >90 >90   GFRESTBLACK  --   --   --   --  >90 >90 >90   HA  --   --   --   --   --  9.1  --    BILITOTAL 0.4 0.9 0.9   < > 0.9 0.6 0.4   ALBUMIN 4.3 4.5 3.9   < > 4.0 4.2 3.8   PROTTOTAL 6.9 7.1 7.5   < > 7.4 8.2 7.5   ALKPHOS 58 58 52   < > 63 56 68   AST 15 30 17   < > 15 14 19   ALT 17 30 23   < > 22 15 18    < > = values in this interval not displayed.     Calcium/VitaminD  Recent Labs   Lab Test 01/12/21  1350   HA 9.1   VITDT 38     ESR/CRP  Recent Labs   Lab Test 10/26/22  0952 09/09/22  1122 07/27/22  1044   SED 6 5 5   CRP <3.00 <3.00 3.3     Hepatitis B  Recent Labs   Lab Test 09/28/20  1332 02/28/17  1201   HBCAB Nonreactive  --    HEPBANG Nonreactive Negative     Hepatitis C  Recent Labs   Lab Test 09/28/20  1332 01/04/18  1039   HCVAB Nonreactive Negative     Lyme ab screening  Recent Labs   Lab Test 09/28/20  1332   LYMEGM 0.08     Tuberculosis Screening  Recent Labs   Lab Test 10/26/22  0952 04/01/22  1427 02/23/21  1112 01/12/21  1350   TBRES Negative Negative  --   --    TBRST  --   --  Negative Positive*     HIV Screening  Recent Labs   Lab Test  09/28/20  1332 02/28/17  1201   HIAGAB Nonreactive Negative     [facility] reviewed in CareEverywhere:  11/27/2017: .3, .8  1/4/2018: HCV ab negative, SABRINA negative    Immunization History     Immunization History   Administered Date(s) Administered     COVID-19 Vaccine 12+ (Pfizer) 01/08/2021, 01/28/2021, 11/11/2021     HPV 01/09/2007, 03/20/2007, 03/27/2008          Chart documentation done in part with Dragon Voice recognition Software. Although reviewed after completion, some word and grammatical error may remain.    Video-Visit Details    Type of service:  Video Visit    Video Start Time: 10:49 AM    Video End Time: 10:58 AM     Originating Location (pt. Location): Home, [place]    Distant Location (provider location):  Off-site, [place]    Platform used for Video Visit: Chivo Toussaint MD

## 2023-01-25 ENCOUNTER — LAB (OUTPATIENT)
Dept: LAB | Facility: CLINIC | Age: 38
End: 2023-01-25
Payer: COMMERCIAL

## 2023-01-25 DIAGNOSIS — M05.9 SEROPOSITIVE RHEUMATOID ARTHRITIS (H): ICD-10-CM

## 2023-01-25 DIAGNOSIS — Z79.899 HIGH RISK MEDICATION USE: ICD-10-CM

## 2023-01-25 LAB
ALBUMIN SERPL BCG-MCNC: 4.3 G/DL (ref 3.5–5.2)
ALP SERPL-CCNC: 55 U/L (ref 35–104)
ALT SERPL W P-5'-P-CCNC: 12 U/L (ref 10–35)
AST SERPL W P-5'-P-CCNC: 14 U/L (ref 10–35)
BASOPHILS # BLD AUTO: 0 10E3/UL (ref 0–0.2)
BASOPHILS NFR BLD AUTO: 1 %
BILIRUB DIRECT SERPL-MCNC: <0.2 MG/DL (ref 0–0.3)
BILIRUB SERPL-MCNC: 0.6 MG/DL
CREAT SERPL-MCNC: 0.77 MG/DL (ref 0.51–0.95)
CRP SERPL-MCNC: <3 MG/L
EOSINOPHIL # BLD AUTO: 0.1 10E3/UL (ref 0–0.7)
EOSINOPHIL NFR BLD AUTO: 2 %
ERYTHROCYTE [DISTWIDTH] IN BLOOD BY AUTOMATED COUNT: 12.8 % (ref 10–15)
ERYTHROCYTE [SEDIMENTATION RATE] IN BLOOD BY WESTERGREN METHOD: 4 MM/HR (ref 0–20)
FASTING STATUS PATIENT QL REPORTED: NORMAL
GFR SERPL CREATININE-BSD FRML MDRD: >90 ML/MIN/1.73M2
GLUCOSE SERPL-MCNC: 77 MG/DL (ref 70–99)
HCT VFR BLD AUTO: 39.3 % (ref 35–47)
HGB BLD-MCNC: 13.4 G/DL (ref 11.7–15.7)
IMM GRANULOCYTES # BLD: 0 10E3/UL
IMM GRANULOCYTES NFR BLD: 0 %
LYMPHOCYTES # BLD AUTO: 2 10E3/UL (ref 0.8–5.3)
LYMPHOCYTES NFR BLD AUTO: 34 %
MCH RBC QN AUTO: 31.6 PG (ref 26.5–33)
MCHC RBC AUTO-ENTMCNC: 34.1 G/DL (ref 31.5–36.5)
MCV RBC AUTO: 93 FL (ref 78–100)
MONOCYTES # BLD AUTO: 0.7 10E3/UL (ref 0–1.3)
MONOCYTES NFR BLD AUTO: 12 %
NEUTROPHILS # BLD AUTO: 3 10E3/UL (ref 1.6–8.3)
NEUTROPHILS NFR BLD AUTO: 51 %
PLATELET # BLD AUTO: 219 10E3/UL (ref 150–450)
PROT SERPL-MCNC: 7 G/DL (ref 6.4–8.3)
RBC # BLD AUTO: 4.24 10E6/UL (ref 3.8–5.2)
WBC # BLD AUTO: 5.9 10E3/UL (ref 4–11)

## 2023-01-25 PROCEDURE — 82947 ASSAY GLUCOSE BLOOD QUANT: CPT

## 2023-01-25 PROCEDURE — 36415 COLL VENOUS BLD VENIPUNCTURE: CPT

## 2023-01-25 PROCEDURE — 86140 C-REACTIVE PROTEIN: CPT

## 2023-01-25 PROCEDURE — 82565 ASSAY OF CREATININE: CPT

## 2023-01-25 PROCEDURE — 85652 RBC SED RATE AUTOMATED: CPT

## 2023-01-25 PROCEDURE — 80076 HEPATIC FUNCTION PANEL: CPT

## 2023-01-25 PROCEDURE — 85025 COMPLETE CBC W/AUTO DIFF WBC: CPT

## 2023-01-29 ENCOUNTER — MYC MEDICAL ADVICE (OUTPATIENT)
Dept: FAMILY MEDICINE | Facility: CLINIC | Age: 38
End: 2023-01-29
Payer: COMMERCIAL

## 2023-03-17 ENCOUNTER — VIRTUAL VISIT (OUTPATIENT)
Dept: RHEUMATOLOGY | Facility: CLINIC | Age: 38
End: 2023-03-17
Payer: COMMERCIAL

## 2023-03-17 DIAGNOSIS — M05.9 SEROPOSITIVE RHEUMATOID ARTHRITIS (H): Primary | ICD-10-CM

## 2023-03-17 PROCEDURE — 99214 OFFICE O/P EST MOD 30 MIN: CPT | Mod: VID | Performed by: INTERNAL MEDICINE

## 2023-03-17 NOTE — PATIENT INSTRUCTIONS
RHEUMATOLOGY    Dr. Toy Toussaint    [facility]  [address] [place] [id number]  Phone number: [phone]  Fax number: [phone]      Thank you for choosing [facility]!

## 2023-03-17 NOTE — PROGRESS NOTES
Geovanna Schwartz  is a 37 year old year old who is being evaluated via a billable video visit.      How would you like to obtain your AVS? MyChart  If the video visit is dropped, the invitation should be resent by: Text to cell phone: [phone]   Will anyone else be joining your video visit? No     Rheumatology Video Visit      Geovanna Schwartz MRN# [id number]   YOB: 1985 Age: 37 year old      Date of visit: 3/17/23   PCP: Dr. Vicky Hu at [facility] ([facility])    Chief Complaint   Patient presents with:  Rheumatoid Arthritis: Reaction to humira, last injection was Tuesday March 7th    Assessment and Plan     1. Seropositive Nonerosive Rheumatoid Arthritis (.3, .8): Dx'd 2017.  Established care with me on 9/24/2020, at which time she had swelling of the bilateral second MCPs and wrists, and was on prednisone 2.5 mg daily.  Previously on HCQ (GI upset), SSZ (partially effective; she thought methotrexate was replacing sulfasalazine so stopped it previously when it was supposed to be combination therapy; she notes that she has a hard time taking twice daily medications). Was doing well on prednisone 2.5mg every other day and methotrexate 22.5 mg once weekly (started 5/18/2021), but hyperglycemia and had difficulty tapering off prednisone; higher dose of methotrexate was associated with fatigue.  Currently doing well on a combination of Humira 40 mg SQ every 14 days, methotrexate 20 mg once weekly, and prednisone 2 mg daily.  Had not taken Humira consistently because of flu, then COVID-19 infection; since taking Che consistently she has been doing better.  Arthritis is well controlled at this time.  However, she had a quarter sized localized injection site reaction to injections ago, then her most recent Humira injection resulted in a larger area of erythema and increased warmth that resolved spontaneously after 3 days.  We discussed the option of changing to  Simponi or trying Humira again.  Because she has been using Zyrtec daily for the past 2 years and her seasonal allergies have worsened, advised that she change to Allegra for both seasonal allergies and hopefully to help with injection site reaction.  Also advised that she ice the injection site 10 minutes before the injection and for 10-30 minutes after Humira injection.  If she again has an injection site reaction then we will change to Simponi.   Chronic illness, side effect from medication.    - Continue Methotrexate 20mg oral once every 7 days  - Continue folic acid 1mg daily  - Continue prednisone 2mg daily with plans to reduce in the future (next visit)  - Continue Humira 40mg SQ every 14 days; with Allegra premedication and icing the injection site before and after injection  - Labs every 8-12 weeks: CBC, Creatinine, Hepatic Panel, ESR, CRP, glucose    High risk medication requiring intensive toxicity monitoring at least quarterly: labs ordered include CBC, Creatinine, Hepatic panel to monitor for cytopenia and hepatotoxicity; checking creatinine as it affects clearance of medication.      # Golimumab (Simponi) Risks and Benefits: The risks and benefits of golimumab were discussed in detail and the patient verbalized understanding.  The risks discussed include, but are not limited to, the risk for hypersensitivity, anaphylaxis, anaphylactoid reactions, an increased risk for serious infections leading to hospitalization or death, a possible increased risk for lymphoma and other malignancies, a possible worsening of demyelinating diseases, a possible worsening of heart failure, risk for cytopenias, risk for drug induced lupus, possible reactivation of hepatitis B, and possible reactivation of latent tuberculosis.  Subcutaneous injections may result in injection site reactions and/or pain at the site of injection.  The most common adverse reactions are infections and injection site reactions.  It was discussed  that the medication would need to be discontinued if a serious infection develops.  It was discussed that live vaccinations should not be received while using golimumab or within 30 days prior to starting golimumab.  I encouraged reviewing the package insert and asking any questions about the medication.      # Pregnancy planning: IUD; she verbalized understanding that DMARDs will need adjustment at least 3 months prior to conception    2. Low vitamin D: Doing well with vitamin D supplementation.  - Continue vitamin D 1000 IU daily (OTC)    3.  Vaccinations: Vaccinations reviewed with Ms. Schwartz.  Risks and benefits of vaccinations were discussed.    - Influenza: encouraged yearly vaccination  - COVID-19: Needs updating    Total minutes spent in evaluation with patient, documentation, , and review of pertinent studies and chart notes: 20     Ms. Schwartz verbalized agreement with and understanding of the rational for the diagnosis and treatment plan.  All questions were answered to best of my ability and the patient's satisfaction. Ms. Schwartz was advised to contact the clinic with any questions that may arise after the clinic visit.      Thank you for involving me in the care of the patient    Return to clinic: April, as previously scheduled      HPI   Geovanna Schwartz is a 37 year old female with a past medical history significant for allergic rhinitis, tobacco use, and rheumatoid arthritis who is seen for follow-up of rheumatoid arthritis.    6/7/2019 [facility] clinic note by Dr. Wade Weiner document seropositive rheumatoid arthritis on prednisone 2.5 mg daily and sulfasalazine 1000 mg twice daily    9/24/2020: Ms. Schwartz reports that she has RA.  On prednisone 2.5mg daily now and it helps.  Previously on SSZ 1000mg daily when she was following with [facility]ology and this was effective but not completely controlling symptoms so she was going to increase to BID but  then lost to follow-up due to insurance change. Wrists and 2nd MCPs are the most affected joints; intermittent pain and stiffness in these joints; worse in the AM.  If very swollen in these joints then she doesn't want to move them at all, but when able to move without much pain then she feels better and better with increased physical activity. Felt better when on SSZ but not complete control when on SSZ once daily; so she had just started SSZ BID dosing before changing insurance and then not having rheumatology follow-up .  HCQ was used without much improvement and she had GI upset with it; started 3 years ago when first dx'd and breastfeeding.  Currently not planning to have additional children. Birth control with IUD.   Morning stiffness for >45 min.  Was on vitamin D but has stopped.     1/6/2021: Sulfasalazine has been beneficial but she still has active joint symptoms primarily in her wrists and second MCPs.  She states that her symptoms are much more tolerable and she has more better days than she had in the past but she still has these active symptoms.  Pregnancy plans are not yet determined with her  so she is going to have this conversation with him soon.    4/14/2021: Pain in her shoulders if she raises her arms above her head, worse with abduction.  Wrists, second MCPs and MTPs are achy, worse in the morning improves time and activity.  Morning stiffness for approximately 30-45 minutes.  She has discussed conception planning with her  and they are planning for no more children so she will continue with the IUD.  She states that she would like to start methotrexate.    7/14/2021: Mild pain at the right third MCP for the past 2 days without swelling or increased warmth.  Also mild pain at the left fifth MTP.  Both of these joints have improved with initiation of methotrexate, that was started on May 18, 2021.  Other joints are doing well.  Morning stiffness for about 30 minutes.  No gelling  phenomenon.  Tolerating medications well.    11/16/2021: Geovanna Schwartz was unable to connect by video due to her internet connection that she suspects was poor due to it being windy outside and having satellite Internet; she wanted changed to telephone visit.  Doing well with regard to arthritis as long as she remains on prednisone.  She was able to stop prednisone 2.5 mg daily for about 2 weeks before her symptoms started to return so she will be started prednisone and has been doing well since that time.  She also informs me today that she is not taking sulfasalazine and has not been since she started methotrexate because she thought methotrexate was replacing sulfasalazine.  Currently without joint pain or swelling.  No morning stiffness or gelling phenomenon.    3/8/2022: Currently doing well.  A bit more achy since reducing prednisone from 2.5 mg daily, to 2.5 mg every other day and has been doing for so for more than 1 week now.  Tolerating methotrexate well.  Arthritis is not limiting her daily activities.  Morning stiffness for less than 20 minutes.    6/8/2022: achy when reducing prednisone to 2mg daily but is tolerable, mild. Morning stiffness <30 min. Tolerating medications well. Arthritis not limiting daily activities.     9/13/2022: more fatigue since increasing MTX to 25mg wily.  Joint pain at the MCPs and PIPs that is worse in the AM and improved with time and activity. Morning stiffness 20 min - 3 hours.      1/2/2023: Taking methotrexate once weekly, folic acid daily, prednisone 2 mg daily, and is taken about 3 doses of Humira.  Feels like her arthritis is doing well at this time.  Humira has not been taken regularly because her whole family had a very significant flu infection, and now her family has COVID-19 infection.  She says that she tested positive for COVID-19 infection on Friday, testing only because her family had COVID-19 infection, but she did not have any symptoms until just  recently and they are very mild nasal congestion symptoms so she prefers to monitor without treatment at this time.  No fevers or chills.    Today, 3/17/2023: Seen sooner than previously scheduled because of localized Humira injection site reaction.  Quarter-sized reaction to Humira, then the next dose resulted in a larger reaction with raised erythematous warm area that resolved after 2 days.  She uses Zyrtec daily for the past 2 years for seasonal allergies.  Seasonal allergies are worse this year.  Does not ice the injection site.  Arthritis is doing well.    Denies fevers, chills, nausea, vomiting, constipation, diarrhea. No abdominal pain. No chest pain/pressure, palpitations, or shortness of breath. No LE swelling. No neck pain. No oral or nasal sores.  No rash. No sicca symptoms.     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[facility] [employer]    ROS   12 point review of system was completed and negative except as noted in the HPI     Active Problem List     Patient Active Problem List   Diagnosis     Personal history of contraception, presenting hazards to health     Allergic rhinitis due to other allergen     Viral warts     Tobacco use disorder     Other acne     Female genital symptoms     Adjustment disorder with anxiety     CARDIOVASCULAR SCREENING; LDL GOAL LESS THAN 160     Seropositive rheumatoid arthritis (H)     Past Medical History     Past Medical History:   Diagnosis Date     Abnormal Pap smear of cervix     greater than 5 yrs ago; leep      Depressive disorder, not elsewhere classified      Mental disorder     anxiety, depression     PAP SMEAR CERVIX W LGSIL 12/29/2006    Colpo HPV 6 and 16 DNA.-   RUTHANN 1 on of her cervical bx.   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 This is a more severe abnormality than indicated by the previous  biopsies. I would recommend Pap smears as outlined. Please call. Alejandro Peter M.D. She will need follow-up paps at 4,8,12 months. If these are n     Past Surgical History     Past Surgical History:   Procedure Laterality Date     BIOPSY CERVICAL, LOCAL EXCISION, SINGLE/MULTIPLE       LEEP TX, CERVICAL  2/20/2007    RUTHANN I-II     OTHER SURGICAL HISTORY      tonsil     SURGICAL HISTORY OF -   6/22/2004    Left wrist arthroscopy plus triangular      WRIST SURGERY      left     Allergy     Allergies   Allergen Reactions     Hydroxychloroquine Other (See Comments)     Stomach upset, diarrhea     Current Medication List     Current Outpatient Medications   Medication Sig     citalopram (CELEXA) 20 MG tablet TAKE ONE AND ONE-HALF TABLETS BY MOUTH EVERY DAY     diclofenac (VOLTAREN) 1 % topical gel Apply up to 2 grams of 1% gel to hands up to 4 times daily as needed for joint pain (maximum: 8 g per upper extremity per day)     folic acid (FOLVITE) 1 MG tablet Take 1 tablet (1 mg) by mouth daily     hydrOXYzine (VISTARIL) 25 MG capsule Take 1 capsule (25 mg) by mouth 3 times daily as needed for anxiety     methotrexate sodium 2.5 MG TABS Take 8 tablets (20 mg) by mouth every 7 days .  Take all 8 tablets within the same 24 hour period of each week.     predniSONE (DELTASONE) 1 MG tablet Take 2 tablets (2 mg) by mouth daily     Vitamin D, Cholecalciferol, 25 MCG (1000 UT) CAPS Take 1,000 Units by mouth daily     adalimumab (HUMIRA *CF*) 40 MG/0.4ML pen kit Inject 0.4 mLs (40 mg) Subcutaneous every 14 days . Hold for signs of infection, then seek medical attention. (Patient not taking: Reported on 3/17/2023)     BUPROPION HCL# 300 MG OR TB24 1 TABLET DAILY (Patient not taking: No sig reported)     CELEXA 10 MG OR TABS 30 mg as of 9/23/2020      MG OR TABS Take by mouth 3 times daily as needed  (Patient not taking: Reported on 12/4/2022)     LORATADINE 10 MG OR TABS ONE DAILY (Patient not taking: No sig reported)      "SPRINTEC 28 0.25-35 MG-MCG OR TABS 1 TABLET DAILY (Patient not taking: No sig reported)     VITAMIN D PO  (Patient not taking: Reported on 12/4/2022)     No current facility-administered medications for this visit.         Social History   See HPI    Family History     Family History   Problem Relation Age of Onset     Hypertension Father      Allergies Father      Allergies Brother      Allergies Sister      Depression Mother      Depression Father      Hyperlipidemia Father      Pancreatic Cancer Paternal Aunt      Physical Exam     Temp Readings from Last 3 Encounters:   12/04/22 98.1  F (36.7  C) (Tympanic)   04/17/09 97.8  F (36.6  C) (Tympanic)   12/13/05 98.7  F (37.1  C) (Oral)     BP Readings from Last 5 Encounters:   12/04/22 118/83   06/08/22 120/79   04/17/09 110/60   12/17/08 130/68   03/27/08 118/78     Pulse Readings from Last 1 Encounters:   12/04/22 91     Resp Readings from Last 1 Encounters:   No data found for Resp     Estimated body mass index is 24.01 kg/m  as calculated from the following:    Height as of 11/23/18: 1.689 m (5' 6.5\").    Weight as of 12/4/22: 68.5 kg (151 lb).    GEN: NAD.  HEENT:  Anicteric, noninjected sclera. No obvious external lesions of the ear and nose. Hearing intact.  PULM: No increased work of breathing  SKIN: No rash or jaundice seen  PSYCH: Alert. Appropriate.      Labs / Imaging (select studies)     CBC  Recent Labs   Lab Test 01/25/23  0931 10/26/22  0952 09/09/22  1122 07/30/21  1334 06/24/21  1141 01/12/21  1350 09/28/20  1332   WBC 5.9 7.2 7.8   < > 6.5 8.5 7.9   RBC 4.24 4.16 4.56   < > 4.60 4.55 4.58   HGB 13.4 13.4 14.6   < > 14.5 14.4 14.3   HCT 39.3 39.4 43.2   < > 41.8 42.7 41.8   MCV 93 95 95   < > 91 94 91   RDW 12.8 12.3 13.1   < > 12.1 11.4 11.6    206 217   < > 192 205 198   MCH 31.6 32.2 32.0   < > 31.5 31.6 31.2   MCHC 34.1 34.0 33.8   < > 34.7 33.7 34.2   NEUTROPHIL 51 63 71   < > 61.7 63.0 58.6   LYMPH 34 25 23   < > 28.8 26.3 27.4 "   MONOCYTE 12 9 4   < > 7.8 10.7 11.6   EOSINOPHIL 2 2 1   < > 1.4 0.0 1.9   BASOPHIL 1 0 0   < > 0.3 0.0 0.5   ANEU  --   --   --   --  4.0 5.4 4.6   ALYM  --   --   --   --  1.9 2.2 2.2   MARY  --   --   --   --  0.5 0.9 0.9   AEOS  --   --   --   --  0.1 0.0 0.2   ABAS  --   --   --   --  0.0 0.0 0.0   ANEUTAUTO 3.0 4.5 5.6   < >  --   --   --    ALYMPAUTO 2.0 1.8 1.8   < >  --   --   --    AMONOAUTO 0.7 0.6 0.3   < >  --   --   --    AEOSAUTO 0.1 0.1 0.1   < >  --   --   --    ABSBASO 0.0 0.0 0.0   < >  --   --   --     < > = values in this interval not displayed.     CMP  Recent Labs   Lab Test 01/25/23  0931 10/26/22  0952 09/09/22  1122 07/30/21  1334 06/24/21  1141 01/12/21  1350 09/28/20  1332   GLC 77 87 116*   < >  --  88 83   CR 0.77 0.76 0.78   < > 0.78 0.77 0.79   GFRESTIMATED >90 >90 >90   < > >90 >90 >90   GFRESTBLACK  --   --   --   --  >90 >90 >90   HA  --   --   --   --   --  9.1  --    BILITOTAL 0.6 0.4 0.9   < > 0.9 0.6 0.4   ALBUMIN 4.3 4.3 4.5   < > 4.0 4.2 3.8   PROTTOTAL 7.0 6.9 7.1   < > 7.4 8.2 7.5   ALKPHOS 55 58 58   < > 63 56 68   AST 14 15 30   < > 15 14 19   ALT 12 17 30   < > 22 15 18    < > = values in this interval not displayed.     Calcium/VitaminD  Recent Labs   Lab Test 01/12/21  1350   HA 9.1   VITDT 38     ESR/CRP  Recent Labs   Lab Test 01/25/23  0931 10/26/22  0952 09/09/22  1122 07/27/22  1044 05/31/22  1452 01/03/22  1459   SED 4 6 5 5 4 4   CRP  --   --   --  3.3 <2.9 <2.9   CRPI <3.00 <3.00 <3.00  --   --   --      Hepatitis B  Recent Labs   Lab Test 09/28/20  1332 02/28/17  1201   HBCAB Nonreactive  --    HEPBANG Nonreactive Negative     Hepatitis C  Recent Labs   Lab Test 09/28/20  1332 01/04/18  1039   HCVAB Nonreactive Negative     Lyme ab screening  Recent Labs   Lab Test 09/28/20  1332   LYMEGM 0.08     Tuberculosis Screening  Recent Labs   Lab Test 10/26/22  0952 04/01/22  1427 02/23/21  1112 01/12/21  1350   TBRES Negative Negative  --   --    TBRST  --   --   Negative Positive*     HIV Screening  Recent Labs   Lab Test 09/28/20  1332 02/28/17  1201   HIAGAB Nonreactive Negative     [facility] reviewed in CareEverywhere:  11/27/2017: .3, .8  1/4/2018: HCV ab negative, SABRINA negative    Immunization History     Immunization History   Administered Date(s) Administered     COVID-19 Vaccine 12+ (Pfizer 2022) 06/15/2022     COVID-19 Vaccine 12+ (Pfizer) 01/08/2021, 01/28/2021, 11/11/2021     FLU 6-35 months 12/23/2013     HPV 01/09/2007, 03/20/2007, 03/27/2008     HPV Quadrivalent 01/09/2007, 03/20/2007, 03/27/2008     Influenza (IIV3) PF 10/29/2010, 10/28/2011, 12/23/2013     Influenza Vaccine >6 months (Alfuria,Fluzone) 10/23/2014, 10/02/2015, 11/02/2015, 10/19/2017, 10/05/2018, 11/08/2019, 09/24/2021, 10/07/2022     Influenza Vaccine IM Ages 6-35 Months 4 Valent (PF) 10/31/2016     Influenza Vaccine, 6+MO IM (QUADRIVALENT W/PRESERVATIVES) 10/19/2017     Tdap (Adacel,Boostrix) 08/14/2014, 07/25/2017     Tdap (Adult) Unspecified Formulation 06/19/1999          Chart documentation done in part with Dragon Voice recognition Software. Although reviewed after completion, some word and grammatical error may remain.      Video-Visit Details    Type of service:  Video Visit    Video Start Time: 10:08 AM    Video End Time: 10:20 AM    Originating Location (pt. Location): Home, [place]    Distant Location (provider location):  Off site, [place]    Platform used for Video Visit: Chivo Toussaint MD

## 2023-03-24 ENCOUNTER — LAB REQUISITION (OUTPATIENT)
Dept: LAB | Facility: CLINIC | Age: 38
End: 2023-03-24

## 2023-03-24 PROCEDURE — 86481 TB AG RESPONSE T-CELL SUSP: CPT | Performed by: INTERNAL MEDICINE

## 2023-03-26 LAB
GAMMA INTERFERON BACKGROUND BLD IA-ACNC: 0.03 IU/ML
M TB IFN-G BLD-IMP: NEGATIVE
M TB IFN-G CD4+ BCKGRND COR BLD-ACNC: 9.97 IU/ML
MITOGEN IGNF BCKGRD COR BLD-ACNC: 0.04 IU/ML
MITOGEN IGNF BCKGRD COR BLD-ACNC: 0.07 IU/ML
QUANTIFERON MITOGEN: 10 IU/ML
QUANTIFERON NIL TUBE: 0.03 IU/ML
QUANTIFERON TB1 TUBE: 0.07 IU/ML
QUANTIFERON TB2 TUBE: 0.1

## 2023-04-09 ENCOUNTER — HEALTH MAINTENANCE LETTER (OUTPATIENT)
Age: 38
End: 2023-04-09

## 2023-04-27 ENCOUNTER — LAB (OUTPATIENT)
Dept: LAB | Facility: CLINIC | Age: 38
End: 2023-04-27
Payer: COMMERCIAL

## 2023-04-27 DIAGNOSIS — Z79.899 HIGH RISK MEDICATION USE: ICD-10-CM

## 2023-04-27 DIAGNOSIS — M05.9 SEROPOSITIVE RHEUMATOID ARTHRITIS (H): ICD-10-CM

## 2023-04-27 LAB
ALBUMIN SERPL BCG-MCNC: 4.4 G/DL (ref 3.5–5.2)
ALP SERPL-CCNC: 55 U/L (ref 35–104)
ALT SERPL W P-5'-P-CCNC: 12 U/L (ref 10–35)
AST SERPL W P-5'-P-CCNC: 15 U/L (ref 10–35)
BASOPHILS # BLD AUTO: 0 10E3/UL (ref 0–0.2)
BASOPHILS NFR BLD AUTO: 1 %
BILIRUB DIRECT SERPL-MCNC: <0.2 MG/DL (ref 0–0.3)
BILIRUB SERPL-MCNC: 1 MG/DL
CREAT SERPL-MCNC: 0.84 MG/DL (ref 0.51–0.95)
CRP SERPL-MCNC: <3 MG/L
EOSINOPHIL # BLD AUTO: 0.1 10E3/UL (ref 0–0.7)
EOSINOPHIL NFR BLD AUTO: 2 %
ERYTHROCYTE [DISTWIDTH] IN BLOOD BY AUTOMATED COUNT: 12 % (ref 10–15)
ERYTHROCYTE [SEDIMENTATION RATE] IN BLOOD BY WESTERGREN METHOD: 5 MM/HR (ref 0–20)
FASTING STATUS PATIENT QL REPORTED: ABNORMAL
GFR SERPL CREATININE-BSD FRML MDRD: >90 ML/MIN/1.73M2
GLUCOSE SERPL-MCNC: 103 MG/DL (ref 70–99)
HCT VFR BLD AUTO: 40.2 % (ref 35–47)
HGB BLD-MCNC: 13.8 G/DL (ref 11.7–15.7)
IMM GRANULOCYTES # BLD: 0 10E3/UL
IMM GRANULOCYTES NFR BLD: 0 %
LYMPHOCYTES # BLD AUTO: 2.5 10E3/UL (ref 0.8–5.3)
LYMPHOCYTES NFR BLD AUTO: 40 %
MCH RBC QN AUTO: 31.7 PG (ref 26.5–33)
MCHC RBC AUTO-ENTMCNC: 34.3 G/DL (ref 31.5–36.5)
MCV RBC AUTO: 92 FL (ref 78–100)
MONOCYTES # BLD AUTO: 0.5 10E3/UL (ref 0–1.3)
MONOCYTES NFR BLD AUTO: 9 %
NEUTROPHILS # BLD AUTO: 3 10E3/UL (ref 1.6–8.3)
NEUTROPHILS NFR BLD AUTO: 49 %
PLATELET # BLD AUTO: 204 10E3/UL (ref 150–450)
PROT SERPL-MCNC: 7 G/DL (ref 6.4–8.3)
RBC # BLD AUTO: 4.35 10E6/UL (ref 3.8–5.2)
WBC # BLD AUTO: 6.2 10E3/UL (ref 4–11)

## 2023-04-27 PROCEDURE — 80076 HEPATIC FUNCTION PANEL: CPT

## 2023-04-27 PROCEDURE — 82565 ASSAY OF CREATININE: CPT

## 2023-04-27 PROCEDURE — 86140 C-REACTIVE PROTEIN: CPT

## 2023-04-27 PROCEDURE — 82947 ASSAY GLUCOSE BLOOD QUANT: CPT

## 2023-04-27 PROCEDURE — 85025 COMPLETE CBC W/AUTO DIFF WBC: CPT

## 2023-04-27 PROCEDURE — 85652 RBC SED RATE AUTOMATED: CPT

## 2023-04-27 PROCEDURE — 36415 COLL VENOUS BLD VENIPUNCTURE: CPT

## 2023-04-28 ENCOUNTER — VIRTUAL VISIT (OUTPATIENT)
Dept: RHEUMATOLOGY | Facility: CLINIC | Age: 38
End: 2023-04-28
Payer: COMMERCIAL

## 2023-04-28 ENCOUNTER — TELEPHONE (OUTPATIENT)
Dept: RHEUMATOLOGY | Facility: CLINIC | Age: 38
End: 2023-04-28

## 2023-04-28 DIAGNOSIS — Z79.899 HIGH RISK MEDICATION USE: ICD-10-CM

## 2023-04-28 DIAGNOSIS — M05.9 SEROPOSITIVE RHEUMATOID ARTHRITIS (H): Primary | ICD-10-CM

## 2023-04-28 PROCEDURE — 99214 OFFICE O/P EST MOD 30 MIN: CPT | Mod: VID | Performed by: INTERNAL MEDICINE

## 2023-04-28 RX ORDER — PREDNISONE 1 MG/1
2 TABLET ORAL DAILY
Qty: 180 TABLET | Refills: 1 | Status: SHIPPED | OUTPATIENT
Start: 2023-04-28 | End: 2023-09-15

## 2023-04-28 NOTE — PROGRESS NOTES
Geovanna Schwartz  is a 37 year old year old who is being evaluated via a billable video visit.      How would you like to obtain your AVS? MyChart  If the video visit is dropped, the invitation should be resent by: Text to cell phone: [phone]   Will anyone else be joining your video visit? No       Rheumatology Video Visit      Geovanna Schwartz MRN# [id number]   YOB: 1985 Age: 37 year old      Date of visit: 4/28/23   PCP: Dr. Vicky Hu at [facility] ([facility])    Chief Complaint   Patient presents with:  Rheumatoid Arthritis: Stopped humira due to side effects, down to 1 1/2 tablets of prednisone    Assessment and Plan     1. Seropositive Nonerosive Rheumatoid Arthritis (.3, .8): Dx'd 2017.  Established care with me on 9/24/2020, at which time she had swelling of the bilateral second MCPs and wrists, and was on prednisone 2.5 mg daily.  Previously on HCQ (GI upset), SSZ (partially effective; she thought methotrexate was replacing sulfasalazine so stopped it previously when it was supposed to be combination therapy; she notes that she has a hard time taking twice daily medications). Was doing well on prednisone 2.5mg every other day and methotrexate 22.5 mg once weekly (started 5/18/2021), but hyperglycemia and had difficulty tapering off prednisone; higher dose of methotrexate was associated with fatigue.  Currently doing well on a combination of Humira 40 mg SQ every 14 days ( 11/3/2022-4/28/2023, injection site reactions), methotrexate 20 mg once weekly, and prednisone 1.5 mg daily.  Humira has been effective but having larger and larger injection site reactions despite premedication and icing the area pre and postinjection.  Therefore, changed to a different TNF inhibitor: Simponi.  Okay for Enbrel if insurance requires.  Chronic illness, side effect from medication.    - Continue Methotrexate 20mg oral once every 7 days  - Continue folic acid 1mg daily  -  Continue prednisone 1.5 mg daily   - Discontinue Humira 40mg SQ every 14 days  - Start Simponi 50 mg SQ every 28 days  - Labs every 8-12 weeks: CBC, Creatinine, Hepatic Panel, ESR, CRP, glucose    High risk medication requiring intensive toxicity monitoring at least quarterly: labs ordered include CBC, Creatinine, Hepatic panel to monitor for cytopenia and hepatotoxicity; checking creatinine as it affects clearance of medication.      # Golimumab (Simponi) Risks and Benefits: The risks and benefits of golimumab were discussed in detail and the patient verbalized understanding.  The risks discussed include, but are not limited to, the risk for hypersensitivity, anaphylaxis, anaphylactoid reactions, an increased risk for serious infections leading to hospitalization or death, a possible increased risk for lymphoma and other malignancies, a possible worsening of demyelinating diseases, a possible worsening of heart failure, risk for cytopenias, risk for drug induced lupus, possible reactivation of hepatitis B, and possible reactivation of latent tuberculosis.  Subcutaneous injections may result in injection site reactions and/or pain at the site of injection.  The most common adverse reactions are infections and injection site reactions.  It was discussed that the medication would need to be discontinued if a serious infection develops.  It was discussed that live vaccinations should not be received while using golimumab or within 30 days prior to starting golimumab.  I encouraged reviewing the package insert and asking any questions about the medication.      # Etanercept (Enbrel) Risks and Benefits: The risks and benefits of etanercept were discussed in detail and the patient verbalized understanding.  The risks discussed include, but are not limited to, the risk for hypersensitivity, anaphylaxis, anaphylactoid reactions, an increased risk for serious infections leading to hospitalization or death, a possible  increased risk for lymphoma and other malignancies, a possible worsening of demyelinating diseases, a possible worsening of heart failure, possible reactivation of hepatitis B, and possible reactivation of latent tuberculosis.  Subcutaneous injections may result in injection site reactions and/or pain at the site of injection.  It was discussed that the medication would need to be discontinued if a serious infection develops.  It was discussed that live vaccinations should not be received while using etanercept or within 30 days prior to starting etanercept.  I encouraged reviewing the package insert and asking any questions about the medication.      # Pregnancy planning: IUD; she verbalized understanding that DMARDs will need adjustment at least 3 months prior to conception    2. Low vitamin D: Doing well with vitamin D supplementation.  - Continue vitamin D 1000 IU daily (OTC)    3.  Vaccinations: Vaccinations reviewed with Ms. Schwartz.  Risks and benefits of vaccinations were discussed.    - Influenza: encouraged yearly vaccination  - COVID-19: Needs updating    Total minutes spent in evaluation with patient, documentation, , and review of pertinent studies and chart notes: 20     Ms. Schwartz verbalized agreement with and understanding of the rational for the diagnosis and treatment plan.  All questions were answered to best of my ability and the patient's satisfaction. Ms. Schwartz was advised to contact the clinic with any questions that may arise after the clinic visit.      Thank you for involving me in the care of the patient    Return to clinic: 3 months      HPI   Geovanna Schwartz is a 37 year old female with a past medical history significant for allergic rhinitis, tobacco use, and rheumatoid arthritis who is seen for follow-up of rheumatoid arthritis.    6/7/2019 [facility] rheumatology clinic note by Dr. Wade Weiner document seropositive rheumatoid arthritis on prednisone 2.5 mg  daily and sulfasalazine 1000 mg twice daily    9/24/2020: Ms. Schwartz reports that she has RA.  On prednisone 2.5mg daily now and it helps.  Previously on SSZ 1000mg daily when she was following with [facility] rheumatology and this was effective but not completely controlling symptoms so she was going to increase to BID but then lost to follow-up due to insurance change. Wrists and 2nd MCPs are the most affected joints; intermittent pain and stiffness in these joints; worse in the AM.  If very swollen in these joints then she doesn't want to move them at all, but when able to move without much pain then she feels better and better with increased physical activity. Felt better when on SSZ but not complete control when on SSZ once daily; so she had just started SSZ BID dosing before changing insurance and then not having rheumatology follow-up .  HCQ was used without much improvement and she had GI upset with it; started 3 years ago when first dx'd and breastfeeding.  Currently not planning to have additional children. Birth control with IUD.   Morning stiffness for >45 min.  Was on vitamin D but has stopped.     1/6/2021: Sulfasalazine has been beneficial but she still has active joint symptoms primarily in her wrists and second MCPs.  She states that her symptoms are much more tolerable and she has more better days than she had in the past but she still has these active symptoms.  Pregnancy plans are not yet determined with her  so she is going to have this conversation with him soon.    4/14/2021: Pain in her shoulders if she raises her arms above her head, worse with abduction.  Wrists, second MCPs and MTPs are achy, worse in the morning improves time and activity.  Morning stiffness for approximately 30-45 minutes.  She has discussed conception planning with her  and they are planning for no more children so she will continue with the IUD.  She states that she would like to start  methotrexate.    7/14/2021: Mild pain at the right third MCP for the past 2 days without swelling or increased warmth.  Also mild pain at the left fifth MTP.  Both of these joints have improved with initiation of methotrexate, that was started on May 18, 2021.  Other joints are doing well.  Morning stiffness for about 30 minutes.  No gelling phenomenon.  Tolerating medications well.    11/16/2021: Geovanna Schwartz was unable to connect by video due to her internet connection that she suspects was poor due to it being windy outside and having satellite Internet; she wanted changed to telephone visit.  Doing well with regard to arthritis as long as she remains on prednisone.  She was able to stop prednisone 2.5 mg daily for about 2 weeks before her symptoms started to return so she will be started prednisone and has been doing well since that time.  She also informs me today that she is not taking sulfasalazine and has not been since she started methotrexate because she thought methotrexate was replacing sulfasalazine.  Currently without joint pain or swelling.  No morning stiffness or gelling phenomenon.    3/8/2022: Currently doing well.  A bit more achy since reducing prednisone from 2.5 mg daily, to 2.5 mg every other day and has been doing for so for more than 1 week now.  Tolerating methotrexate well.  Arthritis is not limiting her daily activities.  Morning stiffness for less than 20 minutes.    6/8/2022: achy when reducing prednisone to 2mg daily but is tolerable, mild. Morning stiffness <30 min. Tolerating medications well. Arthritis not limiting daily activities.     9/13/2022: more fatigue since increasing MTX to 25mg wily.  Joint pain at the MCPs and PIPs that is worse in the AM and improved with time and activity. Morning stiffness 20 min - 3 hours.      1/2/2023: Taking methotrexate once weekly, folic acid daily, prednisone 2 mg daily, and is taken about 3 doses of Humira.  Feels like her arthritis is  doing well at this time.  Humira has not been taken regularly because her whole family had a very significant flu infection, and now her family has COVID-19 infection.  She says that she tested positive for COVID-19 infection on Friday, testing only because her family had COVID-19 infection, but she did not have any symptoms until just recently and they are very mild nasal congestion symptoms so she prefers to monitor without treatment at this time.  No fevers or chills.    3/17/2023: Seen sooner than previously scheduled because of localized Humira injection site reaction.  Quarter-sized reaction to Humira, then the next dose resulted in a larger reaction with raised erythematous warm area that resolved after 2 days.  She uses Zyrtec daily for the past 2 years for seasonal allergies.  Seasonal allergies are worse this year.  Does not ice the injection site.  Arthritis is doing well.    Today, 4/28/2023: Humira is effective for arthritis.  Has been able to reduce prednisone to 1.5 mg daily.  However, injection site reactions are getting more intense, with edema, erythema, pain, and pruritus at the injection site.  Morning stiffness for less than 30 minutes.  Increased stress from work and other.     Denies fevers, chills, nausea, vomiting, constipation, diarrhea. No abdominal pain. No chest pain/pressure, palpitations, or shortness of breath. No LE swelling. No neck pain. No oral or nasal sores.  No rash. No sicca symptoms.     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[facility] [place]    ROS   12 point review of system was completed and negative except as noted in the HPI     Active Problem List     Patient Active Problem List   Diagnosis     Personal history of contraception, presenting hazards to health     Allergic rhinitis due to other allergen     Viral warts     Tobacco use disorder     Other acne     Female genital symptoms     Adjustment  disorder with anxiety     CARDIOVASCULAR SCREENING; LDL GOAL LESS THAN 160     Seropositive rheumatoid arthritis (H)     Past Medical History     Past Medical History:   Diagnosis Date     Abnormal Pap smear of cervix     greater than 5 yrs ago; leep      Depressive disorder, not elsewhere classified      Mental disorder     anxiety, depression     PAP SMEAR CERVIX W LGSIL 12/29/2006    Colpo HPV 6 and 16 DNA.-   RUTHANN 1 on of her cervical bx.   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 This is a more severe abnormality than indicated by the previous biopsies. I would recommend Pap smears as outlined. Please call. Alejandro Peter M.D. She will need follow-up paps at 4,8,12 months. If these are n     Past Surgical History     Past Surgical History:   Procedure Laterality Date     BIOPSY CERVICAL, LOCAL EXCISION, SINGLE/MULTIPLE       LEEP TX, CERVICAL  2/20/2007    RUTHANN I-II     OTHER SURGICAL HISTORY      tonsil     SURGICAL HISTORY OF -   6/22/2004    Left wrist arthroscopy plus triangular      WRIST SURGERY      left     Allergy     Allergies   Allergen Reactions     Hydroxychloroquine Other (See Comments)     Stomach upset, diarrhea     Current Medication List     Current Outpatient Medications   Medication Sig     Cetirizine HCl (ZYRTEC ALLERGY PO)      citalopram (CELEXA) 20 MG tablet TAKE ONE AND ONE-HALF TABLETS BY MOUTH EVERY DAY     diclofenac (VOLTAREN) 1 % topical gel Apply up to 2 grams of 1% gel to hands up to 4 times daily as needed for joint pain (maximum: 8 g per upper extremity per day)     folic acid (FOLVITE) 1 MG tablet Take 1 tablet (1 mg) by mouth daily     hydrOXYzine (VISTARIL) 25 MG capsule Take 1 capsule (25 mg) by mouth 3 times daily as needed for anxiety     methotrexate sodium 2.5 MG TABS Take 8 tablets (20 mg) by mouth every 7 days .  Take all 8 tablets within the same 24 hour period of each week.      "predniSONE (DELTASONE) 1 MG tablet Take 2 tablets (2 mg) by mouth daily (Patient taking differently: Take 2 mg by mouth daily 1 1/2 tablets)     Vitamin D, Cholecalciferol, 25 MCG (1000 UT) CAPS Take 1,000 Units by mouth daily     adalimumab (HUMIRA *CF*) 40 MG/0.4ML pen kit Inject 0.4 mLs (40 mg) Subcutaneous every 14 days . Hold for signs of infection, then seek medical attention. (Patient not taking: Reported on 3/17/2023)     BUPROPION HCL# 300 MG OR TB24 1 TABLET DAILY (Patient not taking: No sig reported)     CELEXA 10 MG OR TABS 30 mg as of 9/23/2020      MG OR TABS Take by mouth 3 times daily as needed  (Patient not taking: Reported on 12/4/2022)     LORATADINE 10 MG OR TABS ONE DAILY (Patient not taking: No sig reported)     SPRINTEC 28 0.25-35 MG-MCG OR TABS 1 TABLET DAILY (Patient not taking: No sig reported)     VITAMIN D PO  (Patient not taking: Reported on 12/4/2022)     No current facility-administered medications for this visit.         Social History   See HPI    Family History     Family History   Problem Relation Age of Onset     Hypertension Father      Allergies Father      Allergies Brother      Allergies Sister      Depression Mother      Depression Father      Hyperlipidemia Father      Pancreatic Cancer Paternal Aunt      Physical Exam     Temp Readings from Last 3 Encounters:   12/04/22 98.1  F (36.7  C) (Tympanic)   04/17/09 97.8  F (36.6  C) (Tympanic)   12/13/05 98.7  F (37.1  C) (Oral)     BP Readings from Last 5 Encounters:   12/04/22 118/83   06/08/22 120/79   04/17/09 110/60   12/17/08 130/68   03/27/08 118/78     Pulse Readings from Last 1 Encounters:   12/04/22 91     Resp Readings from Last 1 Encounters:   No data found for Resp     Estimated body mass index is 24.01 kg/m  as calculated from the following:    Height as of 11/23/18: 1.689 m (5' 6.5\").    Weight as of 12/4/22: 68.5 kg (151 lb).    GEN: NAD. Healthy appearing adult.   HEENT: MMM.  Anicteric, noninjected " sclera. No obvious external lesions of the ear and nose. Hearing intact.  PULM: No increased work of breathing  MSK:  Hands and wrists without swelling.   SKIN: No rash or jaundice seen  PSYCH: Alert. Appropriate.         Labs / Imaging (select studies)     CBC  Recent Labs   Lab Test 04/27/23  1455 01/25/23  0931 10/26/22  0952 07/30/21  1334 06/24/21  1141 01/12/21  1350 09/28/20  1332   WBC 6.2 5.9 7.2   < > 6.5 8.5 7.9   RBC 4.35 4.24 4.16   < > 4.60 4.55 4.58   HGB 13.8 13.4 13.4   < > 14.5 14.4 14.3   HCT 40.2 39.3 39.4   < > 41.8 42.7 41.8   MCV 92 93 95   < > 91 94 91   RDW 12.0 12.8 12.3   < > 12.1 11.4 11.6    219 206   < > 192 205 198   MCH 31.7 31.6 32.2   < > 31.5 31.6 31.2   MCHC 34.3 34.1 34.0   < > 34.7 33.7 34.2   NEUTROPHIL 49 51 63   < > 61.7 63.0 58.6   LYMPH 40 34 25   < > 28.8 26.3 27.4   MONOCYTE 9 12 9   < > 7.8 10.7 11.6   EOSINOPHIL 2 2 2   < > 1.4 0.0 1.9   BASOPHIL 1 1 0   < > 0.3 0.0 0.5   ANEU  --   --   --   --  4.0 5.4 4.6   ALYM  --   --   --   --  1.9 2.2 2.2   MARY  --   --   --   --  0.5 0.9 0.9   AEOS  --   --   --   --  0.1 0.0 0.2   ABAS  --   --   --   --  0.0 0.0 0.0   ANEUTAUTO 3.0 3.0 4.5   < >  --   --   --    ALYMPAUTO 2.5 2.0 1.8   < >  --   --   --    AMONOAUTO 0.5 0.7 0.6   < >  --   --   --    AEOSAUTO 0.1 0.1 0.1   < >  --   --   --    ABSBASO 0.0 0.0 0.0   < >  --   --   --     < > = values in this interval not displayed.     CMP  Recent Labs   Lab Test 04/27/23  1455 01/25/23  0931 10/26/22  0952 07/30/21  1334 06/24/21  1141 01/12/21  1350 09/28/20  1332   * 77 87   < >  --  88 83   CR 0.84 0.77 0.76   < > 0.78 0.77 0.79   GFRESTIMATED >90 >90 >90   < > >90 >90 >90   GFRESTBLACK  --   --   --   --  >90 >90 >90   HA  --   --   --   --   --  9.1  --    BILITOTAL 1.0 0.6 0.4   < > 0.9 0.6 0.4   ALBUMIN 4.4 4.3 4.3   < > 4.0 4.2 3.8   PROTTOTAL 7.0 7.0 6.9   < > 7.4 8.2 7.5   ALKPHOS 55 55 58   < > 63 56 68   AST 15 14 15   < > 15 14 19   ALT 12 12 17    < > 22 15 18    < > = values in this interval not displayed.     Calcium/VitaminD  Recent Labs   Lab Test 01/12/21  1350   HA 9.1   VITDT 38     ESR/CRP  Recent Labs   Lab Test 04/27/23  1455 01/25/23  0931 10/26/22  0952 09/09/22  1122 07/27/22  1044 05/31/22  1452 01/03/22  1459   SED 5 4 6   < > 5 4 4   CRP  --   --   --   --  3.3 <2.9 <2.9   CRPI <3.00 <3.00 <3.00   < >  --   --   --     < > = values in this interval not displayed.     Hepatitis B  Recent Labs   Lab Test 09/28/20  1332 02/28/17  1201   HBCAB Nonreactive  --    HEPBANG Nonreactive Negative     Hepatitis C  Recent Labs   Lab Test 09/28/20  1332 01/04/18  1039   HCVAB Nonreactive Negative     Lyme ab screening  Recent Labs   Lab Test 09/28/20  1332   LYMEGM 0.08     Tuberculosis Screening  Recent Labs   Lab Test 03/24/23  1118 10/26/22  0952 04/01/22  1427 02/23/21  1112 01/12/21  1350   TBRES Negative Negative Negative  --   --    TBRST  --   --   --  Negative Positive*     HIV Screening  Recent Labs   Lab Test 09/28/20  1332 02/28/17  1201   HIAGAB Nonreactive Negative       [facility] Labs reviewed in CareEverywhere:  11/27/2017: .3, .8  1/4/2018: HCV ab negative, SABRINA negative    Immunization History     Immunization History   Administered Date(s) Administered     COVID-19 Vaccine 12+ (Pfizer 2022) 06/15/2022     COVID-19 Vaccine 12+ (Pfizer) 01/08/2021, 01/28/2021, 11/11/2021     FLU 6-35 months 12/23/2013     HPV 01/09/2007, 03/20/2007, 03/27/2008     HPV Quadrivalent 01/09/2007, 03/20/2007, 03/27/2008     Influenza (IIV3) PF 10/29/2010, 10/28/2011, 12/23/2013     Influenza Vaccine >6 months (Alfuria,Fluzone) 10/23/2014, 10/02/2015, 11/02/2015, 10/19/2017, 10/05/2018, 11/08/2019, 09/24/2021, 10/07/2022     Influenza Vaccine IM Ages 6-35 Months 4 Valent (PF) 10/31/2016     Influenza Vaccine, 6+MO IM (QUADRIVALENT W/PRESERVATIVES) 10/19/2017     TDAP (Adacel,Boostrix) 08/14/2014, 07/25/2017     Tdap (Adult) Unspecified Formulation  06/19/1999          Chart documentation done in part with Dragon Voice recognition Software. Although reviewed after completion, some word and grammatical error may remain.      Video-Visit Details    Type of service:  Video Visit    Video Start Time: 10:07 AM    Video End Time:10:24 AM    Originating Location (pt. Location): [place], [place]    Distant Location (provider location):  Off-site, [place]    Platform used for Video Visit: Chivo Toussaint MD

## 2023-04-28 NOTE — PATIENT INSTRUCTIONS
RHEUMATOLOGY    Dr. Toy Toussaint    [facility]  [address] [place] [address]  Phone number: [phone]  Fax number: [phone]      Thank you for choosing [facility]!

## 2023-05-01 NOTE — TELEPHONE ENCOUNTER
"05/01/2023  \"If [facility] has not replied within 24 hours for urgent requests or within 48 hours for standard requests, please contact [facility] at [phone].\"      "

## 2023-05-02 NOTE — TELEPHONE ENCOUNTER
PRIOR AUTHORIZATION DENIED    Medication: Simponi- [place] denied    Denial Date: 5/1/2023    Denial Rational:             Appeal Information: e-appeal available      a. You had a trial of or contraindication (harmful for) to any TWO of the following preferred   immunomodulators (class of drugs): Enbrel, Humira, Rinvoq, Xeljanz (immediate   release/extended release)  b. You have tried any tumor necrosis factor (TNF) inhibitor (such as Humira, Enbrel) AND   your physician has indicated you cannot use a GUSTAVO inhibitor (such as Rinvoq, Xeljanz IR/XR)      Thank You,     Ghislaine Rodriguez, [facility]  Specialty Pharmacy Clinic [facility] Specialty  [email or web address]  [email or web address]  Phone: [phone]  Fax: [phone]

## 2023-05-03 RX ORDER — MEDROXYPROGESTERONE ACETATE 150 MG/ML
50 INJECTION, SUSPENSION INTRAMUSCULAR
Qty: 4 ML | Refills: 4 | Status: SHIPPED | OUTPATIENT
Start: 2023-05-03 | End: 2023-09-15

## 2023-05-04 ENCOUNTER — TELEPHONE (OUTPATIENT)
Dept: RHEUMATOLOGY | Facility: CLINIC | Age: 38
End: 2023-05-04
Payer: COMMERCIAL

## 2023-05-04 NOTE — TELEPHONE ENCOUNTER
RN: Please call to notify Geovanna Schwartz that insurance prefers Enbrel, so changing to Enbrel as we had discussed at the last rheumatology visit. Enbrel is a once weekly self-injection.     - Discontinue Simponi (never taken, denied by insurance)  - Start Enbrel 50mg SQ once every 7 days    Toy Toussaint MD  5/3/2023

## 2023-05-04 NOTE — TELEPHONE ENCOUNTER
PA Initiation    Medication: Enbrel - [place] pending  Insurance Company: [employer] - Phone [phone] Fax [phone]  Pharmacy Filling the Rx: [facility] - [place] [address]  Filling Pharmacy Phone: [phone]  Filling Pharmacy Fax: [phone]  Start Date: 5/4/2023  Geovanna Schwartz Key: [id number] - [place] Case ID: [id number]      Thank You,     Ghislaine Rodriguez [facility]  Specialty Pharmacy Clinic [facility] Specialty  [email or web address]  [email or web address]  Phone: [phone]  Fax: [phone]

## 2023-05-08 NOTE — TELEPHONE ENCOUNTER
Prior Authorization Approval    Authorization Effective Date: 5/7/2023  Authorization Expiration Date: 11/3/2023  Medication: Enbrel - [place] Approved  Approved Dose/Quantity: 4 / 28 days (6 fills)  Reference #: Geovanna Schwartz Allen: [id number]   Insurance Company: [employer] - Phone [phone] Fax [phone]  Expected CoPay: co-pay card eligible     CoPay Card Available: Other (see comments) Comment:  [email or web address]  Foundation Assistance Needed:    Which Pharmacy is filling the prescription (Not needed for infusion/clinic administered): [facility] [address] [facility]  Pharmacy Notified: YesComment:  New Spec med  Patient Notified: YesComment:  Mayra Schwartz Key: [id number] - [place] Case ID: [id number]          Thank You,     Cesar Rodriguez [facility]  Specialty Pharmacy Clinic [facility] Specialty  [email or web address]  [email or web address]  Phone: [phone]  Fax: [phone]

## 2023-08-31 ENCOUNTER — PHARMACY VISIT (OUTPATIENT)
Dept: ADMINISTRATIVE | Facility: CLINIC | Age: 38
End: 2023-08-31
Payer: COMMERCIAL

## 2023-08-31 NOTE — PROGRESS NOTES
"   Rheumatoid Arthritis Clinical Follow Up Assessment   Summary Notes  Spoke to pt - started Enbrel on 8/24 - denies SE, med changes, and understands dosing schedule. Pt remains on MTX. Also taking prednisone 1mg - has been on prednisone for going on 6 years. Her goal is to finally come off prednisone.  Most of our conversation was in regards to injection site reactions - this is what made her stop Humira despite having good success with that medication stating \"I finally started to feel like a normal person.\"  Pt did get a delayed injection site reaction after the first enbrel injection. We discussed all the basic techniques. The most glaring issue is that she only allows her med to be out of the fridge for 10-15min prior to injection. This may be enough time, but I suggested taking it out much earlier in the day especially since Enbrel is good at room temp for 30 days.  Also recommended continuing her antihistamine - zyrtec or switching to claritin - although she does not want to take benadryl on her dosing days because it makes her feel groggy. Ice prior to injection and afterward. And finally use topical hydrocortisone and/or diphenhydramine after the injection.  I will follow up in one month mainly regarding tolerability.       Activity Medications    ENBREL        Care Details        What are the patient's goals for this therapy?  tolerability - ISR  get off prednisone - currently on 1mg - has been on for 6 years      Is patient meeting treatment goals?   ? Not appropriate to assess at this time       Michael Barrientos PharmD, [facility]  [phone] - Main  [phone] - Direct extension    "

## 2023-09-01 ENCOUNTER — MYC MEDICAL ADVICE (OUTPATIENT)
Dept: FAMILY MEDICINE | Facility: CLINIC | Age: 38
End: 2023-09-01
Payer: COMMERCIAL

## 2023-09-01 DIAGNOSIS — F43.22 ADJUSTMENT DISORDER WITH ANXIOUS MOOD: ICD-10-CM

## 2023-09-01 NOTE — TELEPHONE ENCOUNTER
"Routing refill request to provider for review/approval because:  Recent (12 mo) or future (30 days) visit within the authorizing provider's specialty     Last Written Prescription Date:  5-8-23  Last Fill Quantity: 45,  # refills: 1   Last office visit provider:  3-15-21     Requested Prescriptions   Pending Prescriptions Disp Refills    citalopram (CELEXA) 20 MG tablet [Pharmacy Med Name: CITALOPRAM HYDROBROMIDE 20MG TABS] 150 tablet 0     Sig: TAKE ONE AND ONE-HALF TABLETS BY MOUTH EVERY DAY       SSRIs Protocol Failed - 9/1/2023  8:22 AM        Failed - Recent (12 mo) or future (30 days) visit within the authorizing provider's specialty     Patient has had an office visit with the authorizing provider or a provider within the authorizing providers department within the previous 12 mos or has a future within next 30 days. See \"Patient Info\" tab in inbasket, or \"Choose Columns\" in Meds & Orders section of the refill encounter.              Passed - Medication is active on med list        Passed - Patient is age 18 or older        Passed - No active pregnancy on record        Passed - No positive pregnancy test in last 12 months         Angelica Sevilla RN  [facility]      Angelica Sevilla RN 09/01/23 11:41 AM  "

## 2023-09-03 RX ORDER — CITALOPRAM HYDROBROMIDE 20 MG/1
TABLET ORAL
Qty: 150 TABLET | Refills: 0 | Status: SHIPPED | OUTPATIENT
Start: 2023-09-03 | End: 2023-11-02

## 2023-09-13 ENCOUNTER — LAB (OUTPATIENT)
Dept: LAB | Facility: CLINIC | Age: 38
End: 2023-09-13
Payer: COMMERCIAL

## 2023-09-13 DIAGNOSIS — Z79.899 HIGH RISK MEDICATION USE: ICD-10-CM

## 2023-09-13 DIAGNOSIS — M05.9 SEROPOSITIVE RHEUMATOID ARTHRITIS (H): ICD-10-CM

## 2023-09-13 LAB
ALBUMIN SERPL BCG-MCNC: 4.5 G/DL (ref 3.5–5.2)
ALP SERPL-CCNC: 54 U/L (ref 35–104)
ALT SERPL W P-5'-P-CCNC: 14 U/L (ref 0–50)
AST SERPL W P-5'-P-CCNC: 17 U/L (ref 0–45)
BASOPHILS # BLD AUTO: 0 10E3/UL (ref 0–0.2)
BASOPHILS NFR BLD AUTO: 1 %
BILIRUB DIRECT SERPL-MCNC: <0.2 MG/DL (ref 0–0.3)
BILIRUB SERPL-MCNC: 0.6 MG/DL
CREAT SERPL-MCNC: 0.92 MG/DL (ref 0.51–0.95)
CRP SERPL-MCNC: <3 MG/L
EGFRCR SERPLBLD CKD-EPI 2021: 82 ML/MIN/1.73M2
EOSINOPHIL # BLD AUTO: 0.2 10E3/UL (ref 0–0.7)
EOSINOPHIL NFR BLD AUTO: 3 %
ERYTHROCYTE [DISTWIDTH] IN BLOOD BY AUTOMATED COUNT: 12.5 % (ref 10–15)
ERYTHROCYTE [SEDIMENTATION RATE] IN BLOOD BY WESTERGREN METHOD: 5 MM/HR (ref 0–20)
FASTING STATUS PATIENT QL REPORTED: ABNORMAL
GLUCOSE SERPL-MCNC: 100 MG/DL (ref 70–99)
HCT VFR BLD AUTO: 41 % (ref 35–47)
HGB BLD-MCNC: 14.1 G/DL (ref 11.7–15.7)
IMM GRANULOCYTES # BLD: 0 10E3/UL
IMM GRANULOCYTES NFR BLD: 0 %
LYMPHOCYTES # BLD AUTO: 1.8 10E3/UL (ref 0.8–5.3)
LYMPHOCYTES NFR BLD AUTO: 30 %
MCH RBC QN AUTO: 31.8 PG (ref 26.5–33)
MCHC RBC AUTO-ENTMCNC: 34.4 G/DL (ref 31.5–36.5)
MCV RBC AUTO: 92 FL (ref 78–100)
MONOCYTES # BLD AUTO: 0.6 10E3/UL (ref 0–1.3)
MONOCYTES NFR BLD AUTO: 11 %
NEUTROPHILS # BLD AUTO: 3.3 10E3/UL (ref 1.6–8.3)
NEUTROPHILS NFR BLD AUTO: 56 %
PLATELET # BLD AUTO: 199 10E3/UL (ref 150–450)
PROT SERPL-MCNC: 7.2 G/DL (ref 6.4–8.3)
RBC # BLD AUTO: 4.44 10E6/UL (ref 3.8–5.2)
WBC # BLD AUTO: 5.9 10E3/UL (ref 4–11)

## 2023-09-13 PROCEDURE — 85025 COMPLETE CBC W/AUTO DIFF WBC: CPT

## 2023-09-13 PROCEDURE — 82947 ASSAY GLUCOSE BLOOD QUANT: CPT

## 2023-09-13 PROCEDURE — 85652 RBC SED RATE AUTOMATED: CPT

## 2023-09-13 PROCEDURE — 86140 C-REACTIVE PROTEIN: CPT

## 2023-09-13 PROCEDURE — 82565 ASSAY OF CREATININE: CPT

## 2023-09-13 PROCEDURE — 80076 HEPATIC FUNCTION PANEL: CPT

## 2023-09-13 PROCEDURE — 36415 COLL VENOUS BLD VENIPUNCTURE: CPT

## 2023-09-15 ENCOUNTER — TELEPHONE (OUTPATIENT)
Dept: RHEUMATOLOGY | Facility: CLINIC | Age: 38
End: 2023-09-15

## 2023-09-15 ENCOUNTER — VIRTUAL VISIT (OUTPATIENT)
Dept: RHEUMATOLOGY | Facility: CLINIC | Age: 38
End: 2023-09-15
Payer: COMMERCIAL

## 2023-09-15 DIAGNOSIS — M05.9 SEROPOSITIVE RHEUMATOID ARTHRITIS (H): ICD-10-CM

## 2023-09-15 DIAGNOSIS — Z79.899 HIGH RISK MEDICATION USE: Primary | ICD-10-CM

## 2023-09-15 PROCEDURE — 99214 OFFICE O/P EST MOD 30 MIN: CPT | Mod: VID | Performed by: INTERNAL MEDICINE

## 2023-09-15 RX ORDER — METHOTREXATE 2.5 MG/1
20 TABLET ORAL
Qty: 104 TABLET | Refills: 0 | Status: SHIPPED | OUTPATIENT
Start: 2023-09-15 | End: 2023-11-02

## 2023-09-15 RX ORDER — PREDNISONE 1 MG/1
2 TABLET ORAL DAILY
Qty: 180 TABLET | Refills: 1 | Status: SHIPPED | OUTPATIENT
Start: 2023-09-15 | End: 2024-03-29

## 2023-09-15 RX ORDER — ABATACEPT 125 MG/ML
125 INJECTION, SOLUTION SUBCUTANEOUS
Qty: 4 ML | Refills: 3 | Status: SHIPPED | OUTPATIENT
Start: 2023-09-15 | End: 2023-12-15

## 2023-09-15 RX ORDER — FOLIC ACID 1 MG/1
1 TABLET ORAL DAILY
Qty: 90 TABLET | Refills: 2 | Status: SHIPPED | OUTPATIENT
Start: 2023-09-15 | End: 2023-11-02

## 2023-09-15 NOTE — PATIENT INSTRUCTIONS
RHEUMATOLOGY    [facility] [place]  [address] [place] [address]  [place] [address]    Phone number: [phone]  Fax number: [phone]    If you need a medication refill, please contact us as you may need lab work and/or a follow up visit prior to your refill.      Thank you for choosing [facility]!    Sheyla Stover CMA Rheumatology

## 2023-09-15 NOTE — TELEPHONE ENCOUNTER
PA Initiation    Medication: ORENCIA CLICKJECT 125 MG/ML SC SOAJ  Insurance Company: [employer] - Phone [phone] Fax [phone]  Pharmacy Filling the Rx: [facility] - [place], [place] - [facility] [address]   Filling Pharmacy Phone: [phone]  Filling Pharmacy Fax: [phone]  Start Date: 9/15/2023  MITCHELL Key: [id number]      Noted GUSTAVO avoided due to cardio risk    Thank You,     Cesar Rodriguez, [facility]  Specialty Pharmacy [facility]  [email or web address]  [email or web address]  Phone: [phone]  Fax: [phone]

## 2023-09-15 NOTE — PROGRESS NOTES
Geovanna Schwartz  is a 37 year old year old who is being evaluated via a billable video visit.      How would you like to obtain your AVS? MyChart  If the video visit is dropped, the invitation should be resent by: Text to cell phone: [phone]   Will anyone else be joining your video visit? No      Rheumatology Video Visit      Geovanna Schwartz MRN# [id number]   YOB: 1985 Age: 37 year old      Date of visit: 9/15/23   PCP: Dr. Vicky Hu at [facility]    Chief Complaint   Patient presents with:  Rheumatoid Arthritis    Assessment and Plan     1. Seropositive Nonerosive Rheumatoid Arthritis (.3, .8): Dx'd 2017.  Established care with me on 9/24/2020, at which time she had swelling of the bilateral second MCPs and wrists, and was on prednisone 2.5 mg daily.  Previously on HCQ (GI upset), SSZ (partially effective; she thought methotrexate was replacing sulfasalazine so stopped it previously when it was supposed to be combination therapy; she notes that she has a hard time taking twice daily medications), Humira (effective, injection site reactions).  Currently on prednisone 1.5 mg daily and methotrexate 20mg once weekly (started 5/18/2021, fatigue with doses above 20 mg once weekly).  Trying to get off of prednisone but had difficulty doing so without additional DMARD.  Humira was effective for arthritis but stopped because of injection site reaction; then prescribed Simponi but insurance denied this because they preferred Enbrel and therefore Enbrel is being used but having injection site reactions.  She would like to change medications because of the injection site reaction and this is reasonable.  Discussed Orencia and Xeljanz; because of the increased cardiovascular and malignancy potential with Xeljanz the patient prefers Orencia over Xeljanz and I agree.  Additionally, she has seropositive, so Orencia is a logical next option.  We discussed both in detail.  If  insurance requires Xeljanz before Orencia, then we will change to this but it is not preferred over Orencia by myself or the patient.  Note that the treatment options were discussed with the insurance comments in the Simponi denial, in mind.  We discussed how to change between Enbrel and Orencia.  Chronic illness, side effect from medication.    - Continue Methotrexate 20mg oral once every 7 days  - Continue folic acid 1mg daily  - Continue prednisone 1.5 mg daily   - Discontinue Enbrel 50 mg SQ every 7 days  - Start Orencia 125 mg SQ every 7 days  - Labs every 8-12 weeks: CBC, Creatinine, Hepatic Panel, ESR, CRP, glucose    High risk medication requiring intensive toxicity monitoring at least quarterly: labs ordered include CBC, Creatinine, Hepatic panel to monitor for cytopenia and hepatotoxicity; checking creatinine as it affects clearance of medication.     # Abatacept (Orencia) Risks and Benefits: The risks and benefits of abatacept were discussed in detail and the patient verbalized understanding.  The risks discussed include, but are not limited to, the risk for hypersensitivity, anaphylaxis, anaphylactoid reactions, an increased risk for serious infections leading to hospitalization or death, and an increased risk for more frequent respiratory adverse events in patients with COPD.  If subcutaneous injections are used, then injection site reactions and/or pain may occur at the site of injection.  The most common side effects were discussed that include headache, upper respiratory tract infections, nasopharyngitis, and nausea.  It was discussed that the medication would need to be discontinued if a serious infection develops.  It was discussed that live vaccinations should not be received while using abatacept or within 30 days prior to starting abatacept.  I encouraged reviewing the package insert and asking any questions about the medication.      # Tofacitinib (Xeljanz) Risks and Benefits: The risks and  benefits of Xeljanz were discussed in detail and the patient verbalized understanding.  The risks discussed include, but are not limited to, the risk for hypersensitivity, anaphylaxis, anaphylactoid reactions, an increased risk for serious infections leading to hospitalization or death, increased risk for lymphoma and other malignancies, an increased risk for gastrointestinal perforation, worsening triglycerides.  The most common adverse reactions were upper respiratory tract infections, headache, diarrhea, and nasopharyngitis.  In addition to routine laboratory monitoring during therapy, labs including CBC, CMP, and lipid panel will be required at the start of therapy and 8 weeks after starting therapy.  I encouraged reviewing the package insert and asking any questions about the medication.       # Pregnancy planning: IUD; she verbalized understanding that DMARDs will need adjustment at least 3 months prior to conception    2. Low vitamin D: Doing well with vitamin D supplementation.  - Continue vitamin D 1000 IU daily (OTC)    3.  Vaccinations: Vaccinations reviewed with Ms. Schwartz.  Risks and benefits of vaccinations were discussed.    - Influenza: encouraged yearly vaccination  - COVID-19: Advised keeping up-to-date, and to hold methotrexate for 1-2 weeks afterward    Total minutes spent in evaluation with patient, documentation, , and review of pertinent studies and chart notes: 22     Ms. Schwartz verbalized agreement with and understanding of the rational for the diagnosis and treatment plan.  All questions were answered to best of my ability and the patient's satisfaction. Ms. Schwartz was advised to contact the clinic with any questions that may arise after the clinic visit.      Thank you for involving me in the care of the patient    Return to clinic: 3 months      HPI   Geovanna Schwartz is a 37 year old female with a past medical history significant for allergic rhinitis,  tobacco use, and rheumatoid arthritis who is seen for follow-up of rheumatoid arthritis.    6/7/2019 [facility] rheumatology clinic note by Dr. Wade Weiner document seropositive rheumatoid arthritis on prednisone 2.5 mg daily and sulfasalazine 1000 mg twice daily    9/24/2020: Ms. Schwartz reports that she has RA.  On prednisone 2.5mg daily now and it helps.  Previously on SSZ 1000mg daily when she was following with [facility] rheumatology and this was effective but not completely controlling symptoms so she was going to increase to BID but then lost to follow-up due to insurance change. Wrists and 2nd MCPs are the most affected joints; intermittent pain and stiffness in these joints; worse in the AM.  If very swollen in these joints then she doesn't want to move them at all, but when able to move without much pain then she feels better and better with increased physical activity. Felt better when on SSZ but not complete control when on SSZ once daily; so she had just started SSZ BID dosing before changing insurance and then not having rheumatology follow-up .  HCQ was used without much improvement and she had GI upset with it; started 3 years ago when first dx'd and breastfeeding.  Currently not planning to have additional children. Birth control with IUD.   Morning stiffness for >45 min.  Was on vitamin D but has stopped.     1/6/2021: Sulfasalazine has been beneficial but she still has active joint symptoms primarily in her wrists and second MCPs.  She states that her symptoms are much more tolerable and she has more better days than she had in the past but she still has these active symptoms.  Pregnancy plans are not yet determined with her  so she is going to have this conversation with him soon.    4/14/2021: Pain in her shoulders if she raises her arms above her head, worse with abduction.  Wrists, second MCPs and MTPs are achy, worse in the morning improves time and activity.  Morning stiffness for  approximately 30-45 minutes.  She has discussed conception planning with her  and they are planning for no more children so she will continue with the IUD.  She states that she would like to start methotrexate.    7/14/2021: Mild pain at the right third MCP for the past 2 days without swelling or increased warmth.  Also mild pain at the left fifth MTP.  Both of these joints have improved with initiation of methotrexate, that was started on May 18, 2021.  Other joints are doing well.  Morning stiffness for about 30 minutes.  No gelling phenomenon.  Tolerating medications well.    11/16/2021: Geovanna Schwartz was unable to connect by video due to her internet connection that she suspects was poor due to it being windy outside and having satellite Internet; she wanted changed to telephone visit.  Doing well with regard to arthritis as long as she remains on prednisone.  She was able to stop prednisone 2.5 mg daily for about 2 weeks before her symptoms started to return so she will be started prednisone and has been doing well since that time.  She also informs me today that she is not taking sulfasalazine and has not been since she started methotrexate because she thought methotrexate was replacing sulfasalazine.  Currently without joint pain or swelling.  No morning stiffness or gelling phenomenon.    3/8/2022: Currently doing well.  A bit more achy since reducing prednisone from 2.5 mg daily, to 2.5 mg every other day and has been doing for so for more than 1 week now.  Tolerating methotrexate well.  Arthritis is not limiting her daily activities.  Morning stiffness for less than 20 minutes.    6/8/2022: achy when reducing prednisone to 2mg daily but is tolerable, mild. Morning stiffness <30 min. Tolerating medications well. Arthritis not limiting daily activities.     9/13/2022: more fatigue since increasing MTX to 25mg wily.  Joint pain at the MCPs and PIPs that is worse in the AM and improved with time  and activity. Morning stiffness 20 min - 3 hours.      1/2/2023: Taking methotrexate once weekly, folic acid daily, prednisone 2 mg daily, and is taken about 3 doses of Humira.  Feels like her arthritis is doing well at this time.  Humira has not been taken regularly because her whole family had a very significant flu infection, and now her family has COVID-19 infection.  She says that she tested positive for COVID-19 infection on Friday, testing only because her family had COVID-19 infection, but she did not have any symptoms until just recently and they are very mild nasal congestion symptoms so she prefers to monitor without treatment at this time.  No fevers or chills.    3/17/2023: Seen sooner than previously scheduled because of localized Humira injection site reaction.  Quarter-sized reaction to Humira, then the next dose resulted in a larger reaction with raised erythematous warm area that resolved after 2 days.  She uses Zyrtec daily for the past 2 years for seasonal allergies.  Seasonal allergies are worse this year.  Does not ice the injection site.  Arthritis is doing well.    4/28/2023: Humira is effective for arthritis.  Has been able to reduce prednisone to 1.5 mg daily.  However, injection site reactions are getting more intense, with edema, erythema, pain, and pruritus at the injection site.  Morning stiffness for less than 30 minutes.  Increased stress from work and other.     Today, 9/15/2023: Enbrel is effective for arthritis.  Overall doing well except for a mild flare of arthritis affecting the right third MCP where she had swelling of that finger for about 1 day, and pain for about 3 days, all improved with putting her hand in an ice water bath.  Biggest issue at this time is Enbrel injection site reactions where there is redness, pain, and pruritus lasting for a couple days each time.  Morning stiffness for less than 30 minutes.  Arthritis does not limit her daily activities.    Denies  fevers, chills, nausea, vomiting, constipation, diarrhea. No abdominal pain. No chest pain/pressure, palpitations, or shortness of breath. No LE swelling. No neck pain. No oral or nasal sores.  No rash. No sicca symptoms.     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[employer] [place]    ROS   12 point review of system was completed and negative except as noted in the HPI     Active Problem List     Patient Active Problem List   Diagnosis    Personal history of contraception, presenting hazards to health    Allergic rhinitis due to other allergen    Viral warts    Tobacco use disorder    Other acne    Female genital symptoms    Adjustment disorder with anxiety    CARDIOVASCULAR SCREENING; LDL GOAL LESS THAN 160    Seropositive rheumatoid arthritis (H)     Past Medical History     Past Medical History:   Diagnosis Date    Abnormal Pap smear of cervix     greater than 5 yrs ago; leep     Depressive disorder, not elsewhere classified     Mental disorder     anxiety, depression    PAP SMEAR CERVIX W LGSIL 12/29/2006    Colpo HPV 6 and 16 DNA.-   RUTHANN 1 on of her cervical bx.   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 This is a more severe abnormality than indicated by the previous biopsies. I would recommend Pap smears as outlined. Please call. Alejandro Peter M.D. She will need follow-up paps at 4,8,12 months. If these are n     Past Surgical History     Past Surgical History:   Procedure Laterality Date    BIOPSY CERVICAL, LOCAL EXCISION, SINGLE/MULTIPLE      LEEP TX, CERVICAL  2/20/2007    RUTHANN I-II    OTHER SURGICAL HISTORY      tonsil    SURGICAL HISTORY OF -   6/22/2004    Left wrist arthroscopy plus triangular     WRIST SURGERY      left     Allergy     Allergies   Allergen Reactions    Hydroxychloroquine Other (See Comments)     Stomach upset, diarrhea      Current Medication List     Current Outpatient Medications   Medication Sig    Cetirizine HCl (ZYRTEC ALLERGY PO)     citalopram (CELEXA) 20 MG tablet TAKE ONE AND ONE-HALF TABLETS BY MOUTH EVERY DAY    diclofenac (VOLTAREN) 1 % topical gel Apply up to 2 grams of 1% gel to hands up to 4 times daily as needed for joint pain (maximum: 8 g per upper extremity per day)    etanercept (ENBREL SURECLICK) 50 MG/ML autoinjector Inject 50 mg Subcutaneous every 7 days . Hold for signs of infection, and seek medical attention.    folic acid (FOLVITE) 1 MG tablet Take 1 tablet (1 mg) by mouth daily    hydrOXYzine (VISTARIL) 25 MG capsule Take 1 capsule (25 mg) by mouth 3 times daily as needed for anxiety    methotrexate sodium 2.5 MG TABS Take 8 tablets (20 mg) by mouth every 7 days .  Take all 8 tablets within the same 24 hour period of each week.    predniSONE (DELTASONE) 1 MG tablet Take 2 tablets (2 mg) by mouth daily    Vitamin D, Cholecalciferol, 25 MCG (1000 UT) CAPS Take 1,000 Units by mouth daily    BUPROPION HCL# 300 MG OR TB24 1 TABLET DAILY (Patient not taking: No sig reported)    CELEXA 10 MG OR TABS 30 mg as of 9/23/2020     MG OR TABS Take by mouth 3 times daily as needed  (Patient not taking: Reported on 12/4/2022)    LORATADINE 10 MG OR TABS ONE DAILY (Patient not taking: No sig reported)    SPRINTEC 28 0.25-35 MG-MCG OR TABS 1 TABLET DAILY (Patient not taking: No sig reported)    VITAMIN D PO  (Patient not taking: Reported on 12/4/2022)     No current facility-administered medications for this visit.         Social History   See HPI    Family History     Family History   Problem Relation Age of Onset    Hypertension Father     Allergies Father     Allergies Brother     Allergies Sister     Depression Mother     Depression Father     Hyperlipidemia Father     Pancreatic Cancer Paternal Aunt      Physical Exam     Temp Readings from Last 3 Encounters:   12/04/22 98.1  F (36.7  C) (Tympanic)   04/17/09  "97.8  F (36.6  C) (Tympanic)   12/13/05 98.7  F (37.1  C) (Oral)     BP Readings from Last 5 Encounters:   12/04/22 118/83   06/08/22 120/79   04/17/09 110/60   12/17/08 130/68   03/27/08 118/78     Pulse Readings from Last 1 Encounters:   12/04/22 91     Resp Readings from Last 1 Encounters:   No data found for Resp     Estimated body mass index is 24.01 kg/m  as calculated from the following:    Height as of 11/23/18: 1.689 m (5' 6.5\").    Weight as of 12/4/22: 68.5 kg (151 lb).    GEN: NAD. Healthy appearing adult.   HEENT:  Anicteric, noninjected sclera. No obvious external lesions of the ear and nose. Hearing intact.  PULM: No increased work of breathing  MSK:  Hands and wrists without swelling.   SKIN: No rash or jaundice seen  PSYCH: Alert. Appropriate.      Labs / Imaging (select studies)       CBC  Recent Labs   Lab Test 09/13/23  0925 04/27/23  1455 01/25/23  0931 07/30/21  1334 06/24/21  1141 01/12/21  1350 09/28/20  1332   WBC 5.9 6.2 5.9   < > 6.5 8.5 7.9   RBC 4.44 4.35 4.24   < > 4.60 4.55 4.58   HGB 14.1 13.8 13.4   < > 14.5 14.4 14.3   HCT 41.0 40.2 39.3   < > 41.8 42.7 41.8   MCV 92 92 93   < > 91 94 91   RDW 12.5 12.0 12.8   < > 12.1 11.4 11.6    204 219   < > 192 205 198   MCH 31.8 31.7 31.6   < > 31.5 31.6 31.2   MCHC 34.4 34.3 34.1   < > 34.7 33.7 34.2   NEUTROPHIL 56 49 51   < > 61.7 63.0 58.6   LYMPH 30 40 34   < > 28.8 26.3 27.4   MONOCYTE 11 9 12   < > 7.8 10.7 11.6   EOSINOPHIL 3 2 2   < > 1.4 0.0 1.9   BASOPHIL 1 1 1   < > 0.3 0.0 0.5   ANEU  --   --   --   --  4.0 5.4 4.6   ALYM  --   --   --   --  1.9 2.2 2.2   MARY  --   --   --   --  0.5 0.9 0.9   AEOS  --   --   --   --  0.1 0.0 0.2   ABAS  --   --   --   --  0.0 0.0 0.0   ANEUTAUTO 3.3 3.0 3.0   < >  --   --   --    ALYMPAUTO 1.8 2.5 2.0   < >  --   --   --    AMONOAUTO 0.6 0.5 0.7   < >  --   --   --    AEOSAUTO 0.2 0.1 0.1   < >  --   --   --    ABSBASO 0.0 0.0 0.0   < >  --   --   --     < > = values in this interval not " displayed.     CMP  Recent Labs   Lab Test 09/13/23  0925 04/27/23  1455 01/25/23  0931 07/30/21  1334 06/24/21  1141 01/12/21  1350 09/28/20  1332   * 103* 77   < >  --  88 83   CR 0.92 0.84 0.77   < > 0.78 0.77 0.79   GFRESTIMATED 82 >90 >90   < > >90 >90 >90   GFRESTBLACK  --   --   --   --  >90 >90 >90   HA  --   --   --   --   --  9.1  --    BILITOTAL 0.6 1.0 0.6   < > 0.9 0.6 0.4   ALBUMIN 4.5 4.4 4.3   < > 4.0 4.2 3.8   PROTTOTAL 7.2 7.0 7.0   < > 7.4 8.2 7.5   ALKPHOS 54 55 55   < > 63 56 68   AST 17 15 14   < > 15 14 19   ALT 14 12 12   < > 22 15 18    < > = values in this interval not displayed.     Calcium/VitaminD  Recent Labs   Lab Test 01/12/21  1350   HA 9.1   VITDT 38     ESR/CRP  Recent Labs   Lab Test 09/13/23  0925 04/27/23  1455 01/25/23  0931 09/09/22  1122 07/27/22  1044 05/31/22  1452 01/03/22  1459   SED 5 5 4   < > 5 4 4   CRP  --   --   --   --  3.3 <2.9 <2.9   CRPI <3.00 <3.00 <3.00   < >  --   --   --     < > = values in this interval not displayed.     Hepatitis B  Recent Labs   Lab Test 09/28/20  1332 02/28/17  1201   HBCAB Nonreactive  --    HEPBANG Nonreactive Negative     Hepatitis C  Recent Labs   Lab Test 09/28/20  1332 01/04/18  1039   HCVAB Nonreactive Negative     Lyme ab screening  Recent Labs   Lab Test 09/28/20  1332   LYMEGM 0.08       Tuberculosis Screening  Recent Labs   Lab Test 03/24/23  1118 10/26/22  0952 04/01/22  1427 02/23/21  1112 01/12/21  1350   TBRES Negative Negative Negative  --   --    TBRST  --   --   --  Negative Positive*     HIV Screening  Recent Labs   Lab Test 09/28/20  1332 02/28/17  1201   HIAGAB Nonreactive Negative           [facility] Labs reviewed in CareEverywhere:  11/27/2017: .3, .8  1/4/2018: HCV ab negative, SABRINA negative    Immunization History     Immunization History   Administered Date(s) Administered    COVID-19 MONOVALENT 12+ (Pfizer) 01/08/2021, 01/28/2021, 11/11/2021    COVID-19 Monovalent 12+ (Pfizer 2022)  06/15/2022    FLU 6-35 months 12/23/2013    HPV 01/09/2007, 03/20/2007, 03/27/2008    HPV Quadrivalent 01/09/2007, 03/20/2007, 03/27/2008    Influenza (IIV3) PF 10/29/2010, 10/28/2011, 12/23/2013    Influenza Vaccine >6 months (Alfuria,Fluzone) 10/23/2014, 10/02/2015, 11/02/2015, 10/19/2017, 10/05/2018, 11/08/2019, 09/24/2021, 10/07/2022    Influenza Vaccine IM Ages 6-35 Months 4 Valent (PF) 10/31/2016    Influenza Vaccine, 6+MO IM (QUADRIVALENT W/PRESERVATIVES) 10/19/2017    TDAP (Adacel,Boostrix) 08/14/2014, 07/25/2017    Tdap (Adult) Unspecified Formulation 06/19/1999          Chart documentation done in part with Dragon Voice recognition Software. Although reviewed after completion, some word and grammatical error may remain.      Video-Visit Details    Type of service:  Video Visit    Video Start Time: 7:42 AM    Video End Time: 8:00 AM    Originating Location (pt. Location): Home, [place]    Distant Location (provider location):  off site, [place]    Platform used for Video Visit: Chivo Toussaint MD

## 2023-09-18 NOTE — TELEPHONE ENCOUNTER
Prior Authorization Approval    Medication: ORENCIA CLICKJECT 125 MG/ML SC SOAJ  Authorization Effective Date: 9/17/2023  Authorization Expiration Date: 3/15/2024  Approved Dose/Quantity: 4 / 28  Reference #: MITCHELL Key: [id number]   Insurance Company: [employer] - Phone [phone] Fax [phone]  Expected CoPay: 5     CoPay Card Available: Yes    Financial Assistance Needed: [email or web address]  Which Pharmacy is filling the prescription: [facility] - [place], [place]  [address] [place] [address]  Pharmacy Notified: Yes  Patient Notified: Yes      Thank You,     Ghisaline Rodriguez [facility]  Specialty Pharmacy [facility]  [email or web address]  [email or web address]  Phone: [phone]  Fax: [phone]

## 2023-09-28 ENCOUNTER — PHARMACY VISIT (OUTPATIENT)
Dept: ADMINISTRATIVE | Facility: CLINIC | Age: 38
End: 2023-09-28
Payer: COMMERCIAL

## 2023-09-28 NOTE — PROGRESS NOTES
Rheumatoid Arthritis Clinical Follow Up Assessment   Completed on 2023/09/28  by Michael Barrientos     Summary Notes  Spoke to pt - she had to switch from Enbrel to Orencia due to worsening ISR. She gave first dose of Orencia on Monday 9/25 - some swelling and itching, but different than Enbrel and not as severe. Pt continues MTX and prednisone.  Rapid3 = 8.33 Pain 3/10, overall 5/10. Pt can do most things in her life, but she is drained. The fatigue is really catching up to her. Pt is not as physically active as she'd like to be, despite having the ability. She even went for a short jog last week and did not suffer greatly from that.  When she is affected by pain it's in her back, hands - specifically thumbs, and her ribs.  Goals: get of prednisone, tolerate injections, and get more physically active.  Follow up - 1 month     Medication - Orencia     Care Details   ? RAPID-3    Dress yourself, including tying shoelaces and doing buttons?   ? Without any difficulty         Get in and out of bed?   ? Without any difficulty         Lift a full cup or glass to your mouth?   ? Without any difficulty         Walk outdoors on flat ground?   ? Without any difficulty         Wash and dry your entire body?   ? Without any difficulty         Bend down to  clothing from the floor?   ? Without any difficulty         Turn regular faucets on and off?   ? Without any difficulty         Get in and out of a car, bus, train or airplane?   ? Without any difficulty         Walk two miles or three kilometers, if you wish?   ? Without any difficulty         Participate in recreational activities and sports as you would like, if you wish?   ? With some difficulty         How much pain have you had because of your condition over the past week? Please indicate how severe your pain has been, on the scale from 0-10 scale (with 0 being no pain and 10 being pain as bad as it could be)   ? 3.0          Considering all the ways in which  illness and health conditions may affect you at this time, please indicate how you are doing on the scale of 0-10 (with 0 being very well and 10 being very poorly)   ? 5.0          Please enter the RAPID-3 score.   ? 8.33    Has the patient been on current medication for more than 6 months?   ? No      What are the patient's current rheumatoid arthritis symptoms?   ? Morning stiffness   ? Fatigue      What are the patient's goals for this therapy?  - tolerability - ISR  - get off prednisone - currently on 1mg - has been on for 6 years  - improve physical activity level      Is patient meeting treatment goals?   ? Not appropriate to assess at this time         Michael BardalesD, [facility]  [phone] - Main  [phone] - Direct extension

## 2023-10-05 ENCOUNTER — HOSPITAL ENCOUNTER (EMERGENCY)
Facility: CLINIC | Age: 38
Discharge: HOME | End: 2023-10-05
Attending: EMERGENCY MEDICINE | Admitting: EMERGENCY MEDICINE
Payer: COMMERCIAL

## 2023-10-05 LAB — GROUP A STREP BY PCR: NOT DETECTED

## 2023-10-05 PROCEDURE — 999N000104 HC STATISTIC NO CHARGE

## 2023-10-05 PROCEDURE — 87651 STREP A DNA AMP PROBE: CPT | Performed by: EMERGENCY MEDICINE

## 2023-10-05 PROCEDURE — 99281 EMR DPT VST MAYX REQ PHY/QHP: CPT

## 2023-11-02 ENCOUNTER — OFFICE VISIT (OUTPATIENT)
Dept: FAMILY MEDICINE | Facility: CLINIC | Age: 38
End: 2023-11-02
Payer: COMMERCIAL

## 2023-11-02 VITALS
OXYGEN SATURATION: 99 % | HEART RATE: 77 BPM | WEIGHT: 145 LBS | SYSTOLIC BLOOD PRESSURE: 117 MMHG | DIASTOLIC BLOOD PRESSURE: 69 MMHG | TEMPERATURE: 98.7 F | BODY MASS INDEX: 22.76 KG/M2 | HEIGHT: 67 IN

## 2023-11-02 DIAGNOSIS — F43.22 ADJUSTMENT DISORDER WITH ANXIETY: ICD-10-CM

## 2023-11-02 DIAGNOSIS — M05.9 SEROPOSITIVE RHEUMATOID ARTHRITIS (H): ICD-10-CM

## 2023-11-02 DIAGNOSIS — Z00.00 ROUTINE GENERAL MEDICAL EXAMINATION AT A HEALTH CARE FACILITY: Primary | ICD-10-CM

## 2023-11-02 DIAGNOSIS — Z12.4 CERVICAL CANCER SCREENING: ICD-10-CM

## 2023-11-02 DIAGNOSIS — F43.22 ADJUSTMENT DISORDER WITH ANXIOUS MOOD: ICD-10-CM

## 2023-11-02 DIAGNOSIS — Z13.6 CARDIOVASCULAR SCREENING; LDL GOAL LESS THAN 160: ICD-10-CM

## 2023-11-02 LAB
CHOLEST SERPL-MCNC: 148 MG/DL
HDLC SERPL-MCNC: 51 MG/DL
LDLC SERPL CALC-MCNC: 85 MG/DL
NONHDLC SERPL-MCNC: 97 MG/DL
TRIGL SERPL-MCNC: 59 MG/DL

## 2023-11-02 PROCEDURE — 87624 HPV HI-RISK TYP POOLED RSLT: CPT | Performed by: FAMILY MEDICINE

## 2023-11-02 PROCEDURE — 99395 PREV VISIT EST AGE 18-39: CPT | Performed by: FAMILY MEDICINE

## 2023-11-02 PROCEDURE — 80061 LIPID PANEL: CPT | Performed by: FAMILY MEDICINE

## 2023-11-02 PROCEDURE — G0145 SCR C/V CYTO,THINLAYER,RESCR: HCPCS | Performed by: FAMILY MEDICINE

## 2023-11-02 PROCEDURE — 36415 COLL VENOUS BLD VENIPUNCTURE: CPT | Performed by: FAMILY MEDICINE

## 2023-11-02 RX ORDER — CITALOPRAM HYDROBROMIDE 20 MG/1
30 TABLET ORAL DAILY
Qty: 150 TABLET | Refills: 3 | Status: SHIPPED | OUTPATIENT
Start: 2023-11-02

## 2023-11-02 RX ORDER — UBIDECARENONE 75 MG
100 CAPSULE ORAL DAILY
COMMUNITY

## 2023-11-02 ASSESSMENT — ENCOUNTER SYMPTOMS
COUGH: 0
SHORTNESS OF BREATH: 0
ARTHRALGIAS: 1
CONSTIPATION: 0
DYSURIA: 0
ABDOMINAL PAIN: 0
FEVER: 0
CHILLS: 0
HEADACHES: 0
MYALGIAS: 1
HEARTBURN: 0
SORE THROAT: 0
PARESTHESIAS: 0
FREQUENCY: 0
NAUSEA: 0
EYE PAIN: 0
DIZZINESS: 0
HEMATURIA: 0
WEAKNESS: 0
PALPITATIONS: 0
HEMATOCHEZIA: 0
DIARRHEA: 0
NERVOUS/ANXIOUS: 0
JOINT SWELLING: 1

## 2023-11-02 ASSESSMENT — PATIENT HEALTH QUESTIONNAIRE - PHQ9
10. IF YOU CHECKED OFF ANY PROBLEMS, HOW DIFFICULT HAVE THESE PROBLEMS MADE IT FOR YOU TO DO YOUR WORK, TAKE CARE OF THINGS AT HOME, OR GET ALONG WITH OTHER PEOPLE: NOT DIFFICULT AT ALL
SUM OF ALL RESPONSES TO PHQ QUESTIONS 1-9: 2
SUM OF ALL RESPONSES TO PHQ QUESTIONS 1-9: 2

## 2023-11-02 ASSESSMENT — ANXIETY QUESTIONNAIRES
3. WORRYING TOO MUCH ABOUT DIFFERENT THINGS: NOT AT ALL
GAD7 TOTAL SCORE: 0
6. BECOMING EASILY ANNOYED OR IRRITABLE: NOT AT ALL
2. NOT BEING ABLE TO STOP OR CONTROL WORRYING: NOT AT ALL
5. BEING SO RESTLESS THAT IT IS HARD TO SIT STILL: NOT AT ALL
4. TROUBLE RELAXING: NOT AT ALL
7. FEELING AFRAID AS IF SOMETHING AWFUL MIGHT HAPPEN: NOT AT ALL
1. FEELING NERVOUS, ANXIOUS, OR ON EDGE: NOT AT ALL
IF YOU CHECKED OFF ANY PROBLEMS ON THIS QUESTIONNAIRE, HOW DIFFICULT HAVE THESE PROBLEMS MADE IT FOR YOU TO DO YOUR WORK, TAKE CARE OF THINGS AT HOME, OR GET ALONG WITH OTHER PEOPLE: NOT DIFFICULT AT ALL
GAD7 TOTAL SCORE: 0

## 2023-11-02 NOTE — PROGRESS NOTES
SUBJECTIVE:   CC: Geovanna is an 38 year old who presents for preventive health visit.       2023     9:50 AM   Additional Questions   Roomed by        Healthy Habits:     Getting at least 3 servings of Calcium per day:  Yes    Bi-annual eye exam:  NO    Dental care twice a year:  Yes    Sleep apnea or symptoms of sleep apnea:  None    Diet:  Gluten-free/reduced and Breakfast skipped    Frequency of exercise:  None    Taking medications regularly:  Yes    Medication side effects:  None    Additional concerns today:  No      Today's PHQ-9 Score:       2023     9:47 AM   PHQ-9 SCORE   PHQ-9 Total Score MyChart 2 (Minimal depression)   PHQ-9 Total Score 2                   Have you ever done Advance Care Planning? (For example, a Health Directive, POLST, or a discussion with a medical provider or your loved ones about your wishes): No, advance care planning information given to patient to review.  Advanced care planning was discussed at today's visit.    Social History     Tobacco Use    Smoking status: Some Days     Years: 2     Types: Cigarettes    Smokeless tobacco: Never    Tobacco comments:     smokes when she drinks   Substance Use Topics    Alcohol use: Yes     Comment: occasional             2023     9:50 AM   Alcohol Use   Prescreen: >3 drinks/day or >7 drinks/week? No     Reviewed orders with patient.  Reviewed health maintenance and updated orders accordingly - Yes  Lab work is in process    Breast Cancer Screenin/2/2023     9:51 AM   Breast CA Risk Assessment (FHS-7)   Do you have a family history of breast, colon, or ovarian cancer? No / Unknown           History of abnormal Pap smear: NO - age 30-65 PAP every 5 years with negative HPV co-testing recommended      2017    12:01 PM 2008    12:00 AM 3/27/2008    12:00 AM   PAP / HPV   PAP Negative for squamous intraepithelial lesion or malignancy  Electronically signed by Vicky Leiva CT (ASCP) on 3/7/2017 at 12:22  "PM        PAP (Historical)  NIL  NIL      Reviewed and updated as needed this visit by clinical staff   Tobacco  Allergies  Meds              Reviewed and updated as needed this visit by Provider                     Review of Systems   Constitutional:  Negative for chills and fever.   HENT:  Negative for congestion, ear pain, hearing loss and sore throat.    Eyes:  Negative for pain and visual disturbance.   Respiratory:  Negative for cough and shortness of breath.    Cardiovascular:  Negative for chest pain, palpitations and peripheral edema.   Gastrointestinal:  Negative for abdominal pain, constipation, diarrhea, heartburn, hematochezia and nausea.   Genitourinary:  Negative for dysuria, frequency, genital sores, hematuria and urgency.   Musculoskeletal:  Positive for arthralgias, joint swelling and myalgias.   Skin:  Negative for rash.   Neurological:  Negative for dizziness, weakness, headaches and paresthesias.   Psychiatric/Behavioral:  Negative for mood changes. The patient is not nervous/anxious.           OBJECTIVE:   /69   Pulse 77   Temp 98.7  F (37.1  C)   Ht 1.689 m (5' 6.5\")   Wt 65.8 kg (145 lb)   LMP  (LMP Unknown)   SpO2 99%   BMI 23.05 kg/m    Physical Exam  GENERAL: healthy, alert and no distress  EYES: Eyes grossly normal to inspection, PERRL and conjunctivae and sclerae normal  HENT: ear canals and TM's normal, nose and mouth without ulcers or lesions  NECK: no adenopathy, no asymmetry, masses, or scars and thyroid normal to palpation  RESP: lungs clear to auscultation - no rales, rhonchi or wheezes  BREAST: normal without masses, tenderness or nipple discharge and no palpable axillary masses or adenopathy  CV: regular rate and rhythm, normal S1 S2, no S3 or S4, no murmur, click or rub, no peripheral edema and peripheral pulses strong  ABDOMEN: soft, nontender, no hepatosplenomegaly, no masses and bowel sounds normal   (female): normal female external genitalia, normal " urethral meatus, vaginal mucosa pink, moist, well rugated, and normal cervix/adnexa/uterus without masses or discharge, IUD string seen  MS: no gross musculoskeletal defects noted, no edema  SKIN: no suspicious lesions or rashes  NEURO: Normal strength and tone, mentation intact and speech normal  PSYCH: mentation appears normal, affect normal/bright    Diagnostic Test Results:  Labs reviewed in Epic  No results found for this or any previous visit (from the past 24 hour(s)).    ASSESSMENT/PLAN:   1. Routine general medical examination at a health care facility  Geovanna presents for her annual exam today.  She will be due for Pap smear so this was done.  She cannot get any immunizations without stopping her rheumatoid arthritis medications so she can hold off for today.  She has a Mirena IUD in place and its working well for her.    2. Cervical cancer screening  - Pap Screen with HPV - recommended age 30 - 65 years    3. Seropositive rheumatoid arthritis (H)  She is following with rheumatology.  She is on a low-dose prednisone and fairly recently started Orencia    4. Adjustment disorder with anxiety  Under good control with current dose of citalopram.    5. CARDIOVASCULAR SCREENING; LDL GOAL LESS THAN 160  We will do a lipid screening today.  - Lipid panel reflex to direct LDL Fasting; Future  - Lipid panel reflex to direct LDL Fasting      Patient has been advised of split billing requirements and indicates understanding: Yes      COUNSELING:  Reviewed preventive health counseling, as reflected in patient instructions       Contraception        She reports that she has been smoking cigarettes. She has never used smokeless tobacco.  Nicotine/Tobacco Cessation Plan:   Information offered: Patient not interested at this time          Vicky Hu MD  [facility]  Answers submitted by the patient for this visit:  Patient Health Questionnaire (Submitted on 11/2/2023)  If you checked off any  problems, how difficult have these problems made it for you to do your work, take care of things at home, or get along with other people?: Not difficult at all  PHQ9 TOTAL SCORE: 2  BRANDON-7 (Submitted on 11/2/2023)  RBANDON 7 TOTAL SCORE: 0

## 2023-11-06 LAB
BKR LAB AP GYN ADEQUACY: NORMAL
BKR LAB AP GYN INTERPRETATION: NORMAL
BKR LAB AP HPV REFLEX: NORMAL
BKR LAB AP PREVIOUS ABNORMAL: NORMAL
PATH REPORT.COMMENTS IMP SPEC: NORMAL
PATH REPORT.COMMENTS IMP SPEC: NORMAL
PATH REPORT.RELEVANT HX SPEC: NORMAL

## 2023-11-08 LAB
HUMAN PAPILLOMA VIRUS 16 DNA: NEGATIVE
HUMAN PAPILLOMA VIRUS 18 DNA: NEGATIVE
HUMAN PAPILLOMA VIRUS FINAL DIAGNOSIS: NORMAL
HUMAN PAPILLOMA VIRUS OTHER HR: NEGATIVE

## 2023-11-09 ENCOUNTER — PHARMACY VISIT (OUTPATIENT)
Dept: ADMINISTRATIVE | Facility: CLINIC | Age: 38
End: 2023-11-09
Payer: COMMERCIAL

## 2023-11-09 NOTE — PROGRESS NOTES
Rheumatoid Arthritis Clinical Follow Up Assessment     Activity Date 2023/11/09     Summary Notes  Spoke to pt - Orencia injections are much more tolerable than Enbrel was becoming for pt. She has been getting a small ISR, but very manageable and does not spread from the injection site.  Adherence: Pt has missed one dose since starting on 9/25, due to illness, otherwise she is doing fine here.  Regimen: Remains on prednisone - down to 1mg - she really wants to stop this medication and will discuss with Dr. Toussaint at [facility] on 12/15. Pt has stopped MTX as of about 2 weeks ago. Stated that she felt it was not working and her brain fog was getting to the point of being completely intolerable. Pt has informed the clinic that she discontinued this med  Rapid3 = 8.666 pain 4/10, overall 4/10 - from a grand scheme, she is remaining mostly stable despite all the changes in recent weeks/months - stopping Enbrel, starting Orencia, stopping MTX. However she reports that she gets  flares  that come and go relatively quickly. She is trying as many self-care measures possible to avoid having to go back up on prednisone. She will taking ibuprofen - 400mg once or maybe twice in a day if the pain is unbearable. I advised that NSAID use is appropriate for flares and this dose is likely not enough. Pt reports that she really just does not like taking medications. I advised possibly trying naproxen instead as it can have longer lasting effects and requires fewer tablets.  It was clear that at this point, pt is simply exasperated with the process of trying to find the right regimen to treat her RA. I offered  about trying to not overwhelm herself by breaking down each day, rather looking at this with a longer lens because we know the timeline is months, not weeks. It has also been positive that she s remained stable the past month despite recent changes and she should view this as progress.  I will follow up with pt in 2  months after she s seen provider in December     Activity Medications    ORENCIA        Care Details    RAPID-3 Assessment    Dress yourself, including tying shoelaces and doing buttons?   ? With some difficulty         Get in and out of bed?   ? With some difficulty         Lift a full cup or glass to your mouth?   ? Without any difficulty         Walk outdoors on flat ground?   ? Without any difficulty         Wash and dry your entire body?   ? Without any difficulty         Bend down to  clothing from the floor?   ? Without any difficulty         Turn regular faucets on and off?   ? Without any difficulty         Get in and out of a car, bus, train or airplane?   ? Without any difficulty         Walk two miles or three kilometers, if you wish?   ? Without any difficulty         Participate in recreational activities and sports as you would like, if you wish?   ? Without any difficulty         How much pain have you had because of your condition over the past week? Please indicate how severe your pain has been, on the scale from 0-10 scale (with 0 being no pain and 10 being pain as bad as it could be)   ? 4.0          Considering all the ways in which illness and health conditions may affect you at this time, please indicate how you are doing on the scale of 0-10 (with 0 being very well and 10 being very poorly)   ? 4.0            Total RAPID-3 score = 8.7           What are the patient's current rheumatoid arthritis symptoms?   ? Morning stiffness   ? Fatigue      What are the patient's goals for this therapy?  tolerability - ISR  get off prednisone - currently on 1mg - has been on for 6 years  improve physical activity level      Is patient meeting treatment goals?   ? Not appropriate to assess at this time       Michael Barrientos PharmD, [facility]  [phone] - Main  [phone] - Direct extension

## 2023-12-13 ENCOUNTER — LAB (OUTPATIENT)
Dept: LAB | Facility: CLINIC | Age: 38
End: 2023-12-13
Payer: COMMERCIAL

## 2023-12-13 DIAGNOSIS — Z79.899 HIGH RISK MEDICATION USE: ICD-10-CM

## 2023-12-13 DIAGNOSIS — M05.9 SEROPOSITIVE RHEUMATOID ARTHRITIS (H): ICD-10-CM

## 2023-12-13 LAB
ALBUMIN SERPL BCG-MCNC: 4.4 G/DL (ref 3.5–5.2)
ALP SERPL-CCNC: 69 U/L (ref 40–150)
ALT SERPL W P-5'-P-CCNC: 13 U/L (ref 0–50)
AST SERPL W P-5'-P-CCNC: 16 U/L (ref 0–45)
BASOPHILS # BLD AUTO: 0 10E3/UL (ref 0–0.2)
BASOPHILS NFR BLD AUTO: 1 %
BILIRUB DIRECT SERPL-MCNC: <0.2 MG/DL (ref 0–0.3)
BILIRUB SERPL-MCNC: 0.6 MG/DL
CREAT SERPL-MCNC: 0.81 MG/DL (ref 0.51–0.95)
CRP SERPL-MCNC: <3 MG/L
EGFRCR SERPLBLD CKD-EPI 2021: >90 ML/MIN/1.73M2
EOSINOPHIL # BLD AUTO: 0.2 10E3/UL (ref 0–0.7)
EOSINOPHIL NFR BLD AUTO: 2 %
ERYTHROCYTE [DISTWIDTH] IN BLOOD BY AUTOMATED COUNT: 11.4 % (ref 10–15)
ERYTHROCYTE [SEDIMENTATION RATE] IN BLOOD BY WESTERGREN METHOD: 5 MM/HR (ref 0–20)
FASTING STATUS PATIENT QL REPORTED: NO
GLUCOSE SERPL-MCNC: 82 MG/DL (ref 70–99)
HCT VFR BLD AUTO: 43.1 % (ref 35–47)
HGB BLD-MCNC: 14.5 G/DL (ref 11.7–15.7)
IMM GRANULOCYTES # BLD: 0 10E3/UL
IMM GRANULOCYTES NFR BLD: 0 %
LYMPHOCYTES # BLD AUTO: 2 10E3/UL (ref 0.8–5.3)
LYMPHOCYTES NFR BLD AUTO: 31 %
MCH RBC QN AUTO: 30.9 PG (ref 26.5–33)
MCHC RBC AUTO-ENTMCNC: 33.6 G/DL (ref 31.5–36.5)
MCV RBC AUTO: 92 FL (ref 78–100)
MONOCYTES # BLD AUTO: 0.6 10E3/UL (ref 0–1.3)
MONOCYTES NFR BLD AUTO: 10 %
NEUTROPHILS # BLD AUTO: 3.7 10E3/UL (ref 1.6–8.3)
NEUTROPHILS NFR BLD AUTO: 56 %
PLATELET # BLD AUTO: 219 10E3/UL (ref 150–450)
PROT SERPL-MCNC: 7.5 G/DL (ref 6.4–8.3)
RBC # BLD AUTO: 4.69 10E6/UL (ref 3.8–5.2)
WBC # BLD AUTO: 6.6 10E3/UL (ref 4–11)

## 2023-12-13 PROCEDURE — 36415 COLL VENOUS BLD VENIPUNCTURE: CPT

## 2023-12-13 PROCEDURE — 85652 RBC SED RATE AUTOMATED: CPT

## 2023-12-13 PROCEDURE — 82565 ASSAY OF CREATININE: CPT

## 2023-12-13 PROCEDURE — 86140 C-REACTIVE PROTEIN: CPT

## 2023-12-13 PROCEDURE — 82947 ASSAY GLUCOSE BLOOD QUANT: CPT

## 2023-12-13 PROCEDURE — 85025 COMPLETE CBC W/AUTO DIFF WBC: CPT

## 2023-12-13 PROCEDURE — 80076 HEPATIC FUNCTION PANEL: CPT

## 2023-12-15 ENCOUNTER — VIRTUAL VISIT (OUTPATIENT)
Dept: RHEUMATOLOGY | Facility: CLINIC | Age: 38
End: 2023-12-15
Payer: COMMERCIAL

## 2023-12-15 VITALS — BODY MASS INDEX: 23.05 KG/M2 | HEIGHT: 67 IN

## 2023-12-15 DIAGNOSIS — M05.9 SEROPOSITIVE RHEUMATOID ARTHRITIS (H): ICD-10-CM

## 2023-12-15 PROCEDURE — 99213 OFFICE O/P EST LOW 20 MIN: CPT | Mod: VID | Performed by: INTERNAL MEDICINE

## 2023-12-15 RX ORDER — ABATACEPT 125 MG/ML
125 INJECTION, SOLUTION SUBCUTANEOUS
Qty: 4 ML | Refills: 4 | Status: SHIPPED | OUTPATIENT
Start: 2023-12-15 | End: 2024-03-29

## 2023-12-15 ASSESSMENT — PAIN SCALES - GENERAL: PAINLEVEL: MILD PAIN (2)

## 2023-12-15 NOTE — PROGRESS NOTES
Geovanna Schwartz  is a 37 year old year old who is being evaluated via a billable video visit.      How would you like to obtain your AVS? MyChart  If the video visit is dropped, the invitation should be resent by: Text to cell phone: [phone]   Will anyone else be joining your video visit? No      Rheumatology Video Visit      Geovanna Schwartz MRN# [id number]   YOB: 1985 Age: 38 year old      Date of visit: 12/15/23   PCP: Dr. Vicky Hu at [facility]    Chief Complaint   Patient presents with:  RECHECK: Seropositive rheumatoid arthritis. Patient notes she is feeling pretty good. The medication has finally helped.     Assessment and Plan     1. Seropositive Nonerosive Rheumatoid Arthritis (.3, .8): Dx'd 2017.  Established care with me on 9/24/2020, at which time she had swelling of the bilateral second MCPs and wrists, and was on prednisone 2.5 mg daily.  Previously on HCQ (GI upset), SSZ (partially effective; she thought methotrexate was replacing sulfasalazine so stopped it previously when it was supposed to be combination therapy; she notes that she has a hard time taking twice daily medications), Humira (effective, injection site reactions), MTX (brain fog, fatigue, partial efficacy; stopped when on orencia and continued to do well on Orencia). Currently on prednisone 1 mg daily and Orencia 125 mg SQ every 7 days.  Doing well at this time with regard to rheumatoid arthritis.  She plans to continue tapering off of prednisone with anticipation of being off of steroids completely within 3 months.  For mild nonpainful nonpruritic quarter-sized Orencia injection site reaction that resolves within 24 hours, advised icing the injection site for 10-20 minutes after injection.  Chronic illness, stable.      - Continue prednisone 1 mg daily, reducing as symptoms tolerate (she plans to reduce to 0.5 mg daily, then stop)  - Continue Orencia 125 mg SQ every 7 days  - No  rheumatology labs prior to follow-up in 3 months    # Pregnancy planning: IUD; she verbalized understanding that DMARDs will need adjustment at least 3 months prior to conception    2. Low vitamin D:   - Continue vitamin D 1000 IU daily (OTC)    3.  Vaccinations: Vaccinations reviewed with Ms. Schwartz.  Risks and benefits of vaccinations were discussed.  Okay to not hold Orencia after COVID-19 vaccination.  Reminded her that if she has a live vaccine then Orencia will need to be held and she should reach out to this clinic for direction.  - Influenza: encouraged yearly vaccination  - [id number]: Advised vaccination  - COVID-19: Advised keeping up-to-date.  Patient is concerned about flaring symptoms if Orencia is held.  Discussed rationale for holding Orencia versus not holding Orencia; ultimately, with shared decision making, will not hold Orencia after COVID-19 vaccination    Total minutes spent in evaluation with patient, documentation, , and review of pertinent studies and chart notes: 16     Ms. Schwartz verbalized agreement with and understanding of the rational for the diagnosis and treatment plan.  All questions were answered to best of my ability and the patient's satisfaction. Ms. Schwartz was advised to contact the clinic with any questions that may arise after the clinic visit.      Thank you for involving me in the care of the patient    Return to clinic: 3 months      HPI   Geovanna Schwartz is a 38 year old female with a past medical history significant for allergic rhinitis, tobacco use, and rheumatoid arthritis who is seen for follow-up of rheumatoid arthritis.    6/7/2019 [facility]ology clinic note by Dr. Wade Weiner document seropositive rheumatoid arthritis on prednisone 2.5 mg daily and sulfasalazine 1000 mg twice daily    9/24/2020: Ms. Schwartz reports that she has RA.  On prednisone 2.5mg daily now and it helps.  Previously on SSZ 1000mg daily when she was  following with [facility]ology and this was effective but not completely controlling symptoms so she was going to increase to BID but then lost to follow-up due to insurance change. Wrists and 2nd MCPs are the most affected joints; intermittent pain and stiffness in these joints; worse in the AM.  If very swollen in these joints then she doesn't want to move them at all, but when able to move without much pain then she feels better and better with increased physical activity. Felt better when on SSZ but not complete control when on SSZ once daily; so she had just started SSZ BID dosing before changing insurance and then not having rheumatology follow-up .  HCQ was used without much improvement and she had GI upset with it; started 3 years ago when first dx'd and breastfeeding.  Currently not planning to have additional children. Birth control with IUD.   Morning stiffness for >45 min.  Was on vitamin D but has stopped.     1/6/2021: Sulfasalazine has been beneficial but she still has active joint symptoms primarily in her wrists and second MCPs.  She states that her symptoms are much more tolerable and she has more better days than she had in the past but she still has these active symptoms.  Pregnancy plans are not yet determined with her  so she is going to have this conversation with him soon.    4/14/2021: Pain in her shoulders if she raises her arms above her head, worse with abduction.  Wrists, second MCPs and MTPs are achy, worse in the morning improves time and activity.  Morning stiffness for approximately 30-45 minutes.  She has discussed conception planning with her  and they are planning for no more children so she will continue with the IUD.  She states that she would like to start methotrexate.    7/14/2021: Mild pain at the right third MCP for the past 2 days without swelling or increased warmth.  Also mild pain at the left fifth MTP.  Both of these joints have improved with  initiation of methotrexate, that was started on May 18, 2021.  Other joints are doing well.  Morning stiffness for about 30 minutes.  No gelling phenomenon.  Tolerating medications well.    11/16/2021: Geovanna Schwartz was unable to connect by video due to her internet connection that she suspects was poor due to it being windy outside and having satellite Internet; she wanted changed to telephone visit.  Doing well with regard to arthritis as long as she remains on prednisone.  She was able to stop prednisone 2.5 mg daily for about 2 weeks before her symptoms started to return so she will be started prednisone and has been doing well since that time.  She also informs me today that she is not taking sulfasalazine and has not been since she started methotrexate because she thought methotrexate was replacing sulfasalazine.  Currently without joint pain or swelling.  No morning stiffness or gelling phenomenon.    3/8/2022: Currently doing well.  A bit more achy since reducing prednisone from 2.5 mg daily, to 2.5 mg every other day and has been doing for so for more than 1 week now.  Tolerating methotrexate well.  Arthritis is not limiting her daily activities.  Morning stiffness for less than 20 minutes.    6/8/2022: achy when reducing prednisone to 2mg daily but is tolerable, mild. Morning stiffness <30 min. Tolerating medications well. Arthritis not limiting daily activities.     9/13/2022: more fatigue since increasing MTX to 25mg wily.  Joint pain at the MCPs and PIPs that is worse in the AM and improved with time and activity. Morning stiffness 20 min - 3 hours.      1/2/2023: Taking methotrexate once weekly, folic acid daily, prednisone 2 mg daily, and is taken about 3 doses of Humira.  Feels like her arthritis is doing well at this time.  Humira has not been taken regularly because her whole family had a very significant flu infection, and now her family has COVID-19 infection.  She says that she tested  positive for COVID-19 infection on Friday, testing only because her family had COVID-19 infection, but she did not have any symptoms until just recently and they are very mild nasal congestion symptoms so she prefers to monitor without treatment at this time.  No fevers or chills.    3/17/2023: Seen sooner than previously scheduled because of localized Humira injection site reaction.  Quarter-sized reaction to Humira, then the next dose resulted in a larger reaction with raised erythematous warm area that resolved after 2 days.  She uses Zyrtec daily for the past 2 years for seasonal allergies.  Seasonal allergies are worse this year.  Does not ice the injection site.  Arthritis is doing well.    4/28/2023: Humira is effective for arthritis.  Has been able to reduce prednisone to 1.5 mg daily.  However, injection site reactions are getting more intense, with edema, erythema, pain, and pruritus at the injection site.  Morning stiffness for less than 30 minutes.  Increased stress from work and other.     9/15/2023: Enbrel is effective for arthritis.  Overall doing well except for a mild flare of arthritis affecting the right third MCP where she had swelling of that finger for about 1 day, and pain for about 3 days, all improved with putting her hand in an ice water bath.  Biggest issue at this time is Enbrel injection site reactions where there is redness, pain, and pruritus lasting for a couple days each time.  Morning stiffness for less than 30 minutes.  Arthritis does not limit her daily activities.    Today, 12/15/2023: In November 2023 she notified this clinic that she stopped methotrexate because she did not feel like it was helping her and she associated with brain fog and fatigue.  Was having flares in her hands, wrist, fingers.  Today, she reports doing well with regard to arthritis.  States that Orencia has finally become effective.  No swelling.  Morning stiffness for less than 30 minutes but says that it  is hard to  morning stiffness because she has had arthritis for so long that she typically just ignores it.  Able to get her ring on and off without any issues; just 2 months ago she was having trouble getting her ring on and off so she would keep it off out of fear that she would not be able to get it off at any point.  Arthritis is not limiting her daily activities.  Quarter-sized erythema at the Orencia injection site that resolves within 24 hours and is not painful or pruritic and does not bother her.  Overall she is very happy with how well she is doing.  Tapering off prednisone currently on prednisone 1 mg daily.  She plans to continue tapering off prednisone.    Denies fevers, chills, nausea, vomiting, constipation, diarrhea. No abdominal pain. No chest pain/pressure, palpitations, or shortness of breath. No LE swelling. No neck pain. No oral or nasal sores.  No rash. No sicca symptoms.     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  This is a more severe abnormality than indicated by the previous biopsies. I would recommend Pap smears as outlined. Please call. Alejandro Peter M.D. She will need follow-up paps at 4,8,12 months. If these are n     Past Surgical History     Past Surgical History:   Procedure Laterality Date    BIOPSY CERVICAL, LOCAL EXCISION, SINGLE/MULTIPLE      LEEP TX, CERVICAL  2/20/2007    RUTHANN I-II    OTHER SURGICAL HISTORY      tonsil    SURGICAL HISTORY OF -   6/22/2004    Left wrist arthroscopy plus triangular     WRIST SURGERY      left     Allergy     Allergies   Allergen Reactions    Hydroxychloroquine Other (See Comments)     Stomach upset, diarrhea     Current Medication List     Current Outpatient Medications   Medication Sig    Abatacept (ORENCIA CLICKJECT) 125 MG/ML SOAJ auto-injector Inject 1 mL (125 mg) Subcutaneous every 7 days    Cetirizine HCl (ZYRTEC ALLERGY PO)     citalopram (CELEXA) 20 MG tablet Take 1.5 tablets (30 mg) by mouth daily    cyanocobalamin (VITAMIN B-12) 100 MCG tablet Take 100 mcg by mouth daily    diclofenac (VOLTAREN) 1 % topical gel Apply up to 2 grams of 1% gel to hands up to 4 times daily as needed for joint pain (maximum: 8 g per upper extremity per day)    Omega-3 Fatty Acids (OMEGA 3 PO)     predniSONE (DELTASONE) 1 MG tablet Take 2 tablets (2 mg) by mouth daily    Vitamin D, Cholecalciferol, 25 MCG (1000 UT) CAPS Take 1,000 Units by mouth daily    hydrOXYzine (VISTARIL) 25 MG capsule Take 1 capsule (25 mg) by mouth 3 times daily as needed for anxiety (Patient not taking: Reported on 12/15/2023)     No current facility-administered medications for this visit.         Social History   See HPI    Family History     Family History   Problem Relation Age of Onset    Hypertension Father     Allergies Father     Allergies Brother     Allergies Sister     Depression Mother     Depression Father     Hyperlipidemia Father     Pancreatic Cancer Paternal Aunt      Physical Exam     Temp Readings  "from Last 3 Encounters:   11/02/23 98.7  F (37.1  C)   12/04/22 98.1  F (36.7  C) (Tympanic)   04/17/09 97.8  F (36.6  C) (Tympanic)     BP Readings from Last 5 Encounters:   11/02/23 117/69   12/04/22 118/83   06/08/22 120/79   04/17/09 110/60   12/17/08 130/68     Pulse Readings from Last 1 Encounters:   11/02/23 77     Resp Readings from Last 1 Encounters:   No data found for Resp     Estimated body mass index is 23.05 kg/m  as calculated from the following:    Height as of this encounter: 1.689 m (5' 6.5\").    Weight as of 11/2/23: 65.8 kg (145 lb).    GEN: NAD. Healthy appearing adult.   HEENT:  Anicteric, noninjected sclera. No obvious external lesions of the ear and nose. Hearing intact.  PULM: No increased work of breathing  MSK:  Hands and wrists without swelling.   SKIN: No rash or jaundice seen  PSYCH: Alert. Appropriate.      Labs / Imaging (select studies)     CBC  Recent Labs   Lab Test 12/13/23  1005 09/13/23  0925 04/27/23  1455 07/30/21  1334 06/24/21  1141 01/12/21  1350 09/28/20  1332   WBC 6.6 5.9 6.2   < > 6.5 8.5 7.9   RBC 4.69 4.44 4.35   < > 4.60 4.55 4.58   HGB 14.5 14.1 13.8   < > 14.5 14.4 14.3   HCT 43.1 41.0 40.2   < > 41.8 42.7 41.8   MCV 92 92 92   < > 91 94 91   RDW 11.4 12.5 12.0   < > 12.1 11.4 11.6    199 204   < > 192 205 198   MCH 30.9 31.8 31.7   < > 31.5 31.6 31.2   MCHC 33.6 34.4 34.3   < > 34.7 33.7 34.2   NEUTROPHIL 56 56 49   < > 61.7 63.0 58.6   LYMPH 31 30 40   < > 28.8 26.3 27.4   MONOCYTE 10 11 9   < > 7.8 10.7 11.6   EOSINOPHIL 2 3 2   < > 1.4 0.0 1.9   BASOPHIL 1 1 1   < > 0.3 0.0 0.5   ANEU  --   --   --   --  4.0 5.4 4.6   ALYM  --   --   --   --  1.9 2.2 2.2   MARY  --   --   --   --  0.5 0.9 0.9   AEOS  --   --   --   --  0.1 0.0 0.2   ABAS  --   --   --   --  0.0 0.0 0.0   ANEUTAUTO 3.7 3.3 3.0   < >  --   --   --    ALYMPAUTO 2.0 1.8 2.5   < >  --   --   --    AMONOAUTO 0.6 0.6 0.5   < >  --   --   --    AEOSAUTO 0.2 0.2 0.1   < >  --   --   --    ABSBASO " 0.0 0.0 0.0   < >  --   --   --     < > = values in this interval not displayed.     CMP  Recent Labs   Lab Test 12/13/23  1005 09/13/23  0925 04/27/23  1455 07/30/21  1334 06/24/21  1141 01/12/21  1350 09/28/20  1332   GLC 82 100* 103*   < >  --  88 83   CR 0.81 0.92 0.84   < > 0.78 0.77 0.79   GFRESTIMATED >90 82 >90   < > >90 >90 >90   GFRESTBLACK  --   --   --   --  >90 >90 >90   HA  --   --   --   --   --  9.1  --    BILITOTAL 0.6 0.6 1.0   < > 0.9 0.6 0.4   ALBUMIN 4.4 4.5 4.4   < > 4.0 4.2 3.8   PROTTOTAL 7.5 7.2 7.0   < > 7.4 8.2 7.5   ALKPHOS 69 54 55   < > 63 56 68   AST 16 17 15   < > 15 14 19   ALT 13 14 12   < > 22 15 18    < > = values in this interval not displayed.     Calcium/VitaminD  Recent Labs   Lab Test 01/12/21  1350   HA 9.1   VITDT 38     ESR/CRP  Recent Labs   Lab Test 12/13/23  1005 09/13/23  0925 04/27/23  1455 09/09/22  1122 07/27/22  1044 05/31/22  1452 01/03/22  1459   SED 5 5 5   < > 5 4 4   CRP  --   --   --   --  3.3 <2.9 <2.9   CRPI <3.00 <3.00 <3.00   < >  --   --   --     < > = values in this interval not displayed.     Lipid Panel  Recent Labs   Lab Test 11/02/23  1026   CHOL 148   TRIG 59   HDL 51   LDL 85   NHDL 97     Hepatitis B  Recent Labs   Lab Test 09/28/20  1332 02/28/17  1201   HBCAB Nonreactive  --    HEPBANG Nonreactive Negative     Hepatitis C  Recent Labs   Lab Test 09/28/20  1332 01/04/18  1039   HCVAB Nonreactive Negative     Lyme ab screening  Recent Labs   Lab Test 09/28/20  1332   LYMEGM 0.08       Tuberculosis Screening  Recent Labs   Lab Test 03/24/23  1118 10/26/22  0952 04/01/22  1427 02/23/21  1112 01/12/21  1350   TBRES Negative Negative Negative  --   --    TBRST  --   --   --  Negative Positive*     HIV Screening  Recent Labs   Lab Test 09/28/20  1332 02/28/17  1201   HIAGAB Nonreactive Negative       [facility] Labs reviewed in CareEverywhere:  11/27/2017: .3, .8  1/4/2018: HCV ab negative, SABRINA negative    Immunization History      Immunization History   Administered Date(s) Administered    COVID-19 MONOVALENT 12+ (Pfizer) 01/08/2021, 01/28/2021, 11/11/2021    COVID-19 Monovalent 12+ (Pfizer 2022) 06/15/2022    HPV 01/09/2007, 03/20/2007, 03/27/2008    HPV Quadrivalent 01/09/2007, 03/20/2007, 03/27/2008    Influenza (IIV3) PF 10/29/2010, 10/28/2011, 12/23/2013    Influenza Vaccine >6 months,quad, PF 10/23/2014, 10/02/2015, 11/02/2015, 10/19/2017, 10/05/2018, 11/08/2019, 09/24/2021, 10/07/2022    Influenza Vaccine IM Ages 6-35 Months 4 Valent (PF) 10/31/2016    Influenza Vaccine, 6+MO IM (QUADRIVALENT W/PRESERVATIVES) 10/19/2017    Influenza, seasonal, injectable, PF 12/23/2013    TDAP (Adacel,Boostrix) 08/14/2014, 07/25/2017    Tdap (Adult) Unspecified Formulation 06/19/1999          Chart documentation done in part with Dragon Voice recognition Software. Although reviewed after completion, some word and grammatical error may remain.      Video-Visit Details    Type of service:  Video Visit    Video Start Time: 9:57 AM    Video End Time:10:07 AM    Originating Location (pt. Location): Home, [place]    Distant Location (provider location):  off site, [place]    Platform used for Video Visit: Suresh Toussaint MD

## 2024-02-27 ENCOUNTER — TELEPHONE (OUTPATIENT)
Dept: RHEUMATOLOGY | Facility: CLINIC | Age: 39
End: 2024-02-27
Payer: COMMERCIAL

## 2024-02-27 NOTE — TELEPHONE ENCOUNTER
PA Initiation    Medication: ORENCIA CLICKJECT 125 MG/ML SC SOAJ  Insurance Company: [employer] - Phone [phone] Fax [phone]  Pharmacy Filling the Rx: [facility] - [place], [place] - [address]  Filling Pharmacy Phone: [phone]  Filling Pharmacy Fax: [phone]  Start Date: 2/27/2024  MITCHELL (Key: [id number])  [email or web address]        Thank You,     Ghislaine Vargas CPhT  Specialty Pharmacy Clinic [facility] Specialty  [email or web address]  [email or web address]  Phone: [phone]  Fax: [phone]

## 2024-02-27 NOTE — TELEPHONE ENCOUNTER
Prior Authorization Approval    Medication: ORENCIA CLICKJECT 125 MG/ML SC SOAJ  Authorization Effective Date: 2/27/2024  Authorization Expiration Date: 2/27/2025  Approved Dose/Quantity: 4 / 28  Reference #: MITCHELL (Key: [id number])   Insurance Company: [employer] - Phone [phone] Fax [phone]  Expected CoPay: $ 5  CoPay Card Available: Other (see comments)    Financial Assistance Needed: [email or web address]   Which Pharmacy is filling the prescription: [facility] - [place], [place] - [address]   Pharmacy Notified: epa renewal  Patient Notified: renewal          Thank You,     Ghislaine Vargas [facility]  Specialty Pharmacy Clinic [facility] Specialty  [email or web address]  [email or web address]  Phone: [phone]  Fax: [phone]     Otezla Counseling: The side effects of Otezla were discussed with the patient, including but not limited to worsening or new depression, weight loss, diarrhea, nausea, upper respiratory tract infection, and headache. Patient instructed to call the office should any adverse effect occur.  The patient verbalized understanding of the proper use and possible adverse effects of Otezla.  All the patient's questions and concerns were addressed.

## 2024-03-29 ENCOUNTER — VIRTUAL VISIT (OUTPATIENT)
Dept: RHEUMATOLOGY | Facility: CLINIC | Age: 39
End: 2024-03-29
Payer: COMMERCIAL

## 2024-03-29 DIAGNOSIS — M05.9 SEROPOSITIVE RHEUMATOID ARTHRITIS (H): ICD-10-CM

## 2024-03-29 DIAGNOSIS — M54.2 NECK PAIN: Primary | ICD-10-CM

## 2024-03-29 PROCEDURE — G2211 COMPLEX E/M VISIT ADD ON: HCPCS | Mod: 95 | Performed by: INTERNAL MEDICINE

## 2024-03-29 PROCEDURE — 99213 OFFICE O/P EST LOW 20 MIN: CPT | Mod: 95 | Performed by: INTERNAL MEDICINE

## 2024-03-29 RX ORDER — PREDNISONE 1 MG/1
1 TABLET ORAL EVERY OTHER DAY
Qty: 45 TABLET | Refills: 1 | Status: SHIPPED | OUTPATIENT
Start: 2024-03-29 | End: 2024-07-12

## 2024-03-29 RX ORDER — ABATACEPT 125 MG/ML
125 INJECTION, SOLUTION SUBCUTANEOUS
Qty: 4 ML | Refills: 4 | Status: SHIPPED | OUTPATIENT
Start: 2024-03-29 | End: 2024-07-12

## 2024-03-29 NOTE — PROGRESS NOTES
Geovanna Schwartz  is a 37 year old year old who is being evaluated via a billable video visit.      How would you like to obtain your AVS? MyChart  If the video visit is dropped, the invitation should be resent by: Text to cell phone: [phone]   Will anyone else be joining your video visit? No      Rheumatology Video Visit      Geovanna Schwartz MRN# [id number]   YOB: 1985 Age: 38 year old      Date of visit: 3/29/24   PCP: Dr. Vicky Hu at [facility]    Chief Complaint   Patient presents with:  RECHECK: Seropositive rheumatoid arthritis    Assessment and Plan     1. Seropositive Nonerosive Rheumatoid Arthritis (.3, .8): Dx'd 2017.  Established care with me on 9/24/2020, at which time she had swelling of the bilateral second MCPs and wrists, and was on prednisone 2.5 mg daily.  Previously on HCQ (GI upset), SSZ (partially effective; she thought methotrexate was replacing sulfasalazine so stopped it previously when it was supposed to be combination therapy; she notes that she has a hard time taking twice daily medications), Humira (effective, injection site reactions), MTX (brain fog, fatigue, partial efficacy; stopped when on orencia and continued to do well on Orencia). Currently on prednisone 1 mg daily and Orencia 125 mg SQ every 7 days.  Doing well at this time with regard to rheumatoid arthritis.  She plans to continue tapering off of prednisone with anticipation of being off of steroids completely within 3 months.  For mild nonpainful nonpruritic quarter-sized Orencia injection site reaction that resolves within 24 hours, advised icing the injection site for 10-20 minutes after injection.  Chronic illness, stable.      - prednisone 1mg every other day currently; advised stopping prednisone if symptoms tolerate.   - Continue Orencia 125 mg SQ every 7 days  - No rheumatology labs prior to follow-up in 3 months    # Pregnancy planning: IUD; she verbalized understanding  that DMARDs will need adjustment at least 3 months prior to conception    2.  Neck pain in the setting of rheumatoid arthritis: 2-week duration.  Check x-rays to evaluate for instability.  No radiculopathy.  - Cervical spine x-rays, including flexion-extension    3. Low vitamin D:   - Continue vitamin D 1000 IU daily (OTC)    4.  Vaccinations: Vaccinations reviewed with Ms. Schwartz.  Risks and benefits of vaccinations were discussed.  Okay to not hold Orencia after COVID-19 vaccination.  Reminded her that if she has a live vaccine then Orencia will need to be held and she should reach out to this clinic for direction.  - Influenza: encouraged yearly vaccination  - [id number]: Advised vaccination  - COVID-19: Advised keeping up-to-date.  Patient is concerned about flaring symptoms if Orencia is held.  Discussed rationale for holding Orencia versus not holding Orencia; ultimately, with shared decision making, will not hold Orencia after COVID-19 vaccination    Total minutes spent in evaluation with patient, documentation, , and review of pertinent studies and chart notes: 16  The longitudinal plan of care for the rheumatology problem(s) were addressed during this visit.  Due to added complexity of care, we will continue to support the patient and the subsequent management of this condition with ongoing continuity of care.     Ms. Schwartz verbalized agreement with and understanding of the rational for the diagnosis and treatment plan.  All questions were answered to best of my ability and the patient's satisfaction. Ms. Schwartz was advised to contact the clinic with any questions that may arise after the clinic visit.      Thank you for involving me in the care of the patient    Return to clinic: 3 months      HPI   Geovanna Schwartz is a 38 year old female with a past medical history significant for allergic rhinitis, tobacco use, and rheumatoid arthritis who is seen for follow-up of  rheumatoid arthritis.    6/7/2019 [facility] rheumatology clinic note by Dr. Wade Weiner document seropositive rheumatoid arthritis on prednisone 2.5 mg daily and sulfasalazine 1000 mg twice daily    9/24/2020: Ms. Schwartz reports that she has RA.  On prednisone 2.5mg daily now and it helps.  Previously on SSZ 1000mg daily when she was following with [facility] rheumatology and this was effective but not completely controlling symptoms so she was going to increase to BID but then lost to follow-up due to insurance change. Wrists and 2nd MCPs are the most affected joints; intermittent pain and stiffness in these joints; worse in the AM.  If very swollen in these joints then she doesn't want to move them at all, but when able to move without much pain then she feels better and better with increased physical activity. Felt better when on SSZ but not complete control when on SSZ once daily; so she had just started SSZ BID dosing before changing insurance and then not having rheumatology follow-up .  HCQ was used without much improvement and she had GI upset with it; started 3 years ago when first dx'd and breastfeeding.  Currently not planning to have additional children. Birth control with IUD.   Morning stiffness for >45 min.  Was on vitamin D but has stopped.     1/6/2021: Sulfasalazine has been beneficial but she still has active joint symptoms primarily in her wrists and second MCPs.  She states that her symptoms are much more tolerable and she has more better days than she had in the past but she still has these active symptoms.  Pregnancy plans are not yet determined with her  so she is going to have this conversation with him soon.    4/14/2021: Pain in her shoulders if she raises her arms above her head, worse with abduction.  Wrists, second MCPs and MTPs are achy, worse in the morning improves time and activity.  Morning stiffness for approximately 30-45 minutes.  She has discussed conception planning with  her  and they are planning for no more children so she will continue with the IUD.  She states that she would like to start methotrexate.    7/14/2021: Mild pain at the right third MCP for the past 2 days without swelling or increased warmth.  Also mild pain at the left fifth MTP.  Both of these joints have improved with initiation of methotrexate, that was started on May 18, 2021.  Other joints are doing well.  Morning stiffness for about 30 minutes.  No gelling phenomenon.  Tolerating medications well.    11/16/2021: Geovanna Schwartz was unable to connect by video due to her internet connection that she suspects was poor due to it being windy outside and having satellite Internet; she wanted changed to telephone visit.  Doing well with regard to arthritis as long as she remains on prednisone.  She was able to stop prednisone 2.5 mg daily for about 2 weeks before her symptoms started to return so she will be started prednisone and has been doing well since that time.  She also informs me today that she is not taking sulfasalazine and has not been since she started methotrexate because she thought methotrexate was replacing sulfasalazine.  Currently without joint pain or swelling.  No morning stiffness or gelling phenomenon.    3/8/2022: Currently doing well.  A bit more achy since reducing prednisone from 2.5 mg daily, to 2.5 mg every other day and has been doing for so for more than 1 week now.  Tolerating methotrexate well.  Arthritis is not limiting her daily activities.  Morning stiffness for less than 20 minutes.    6/8/2022: achy when reducing prednisone to 2mg daily but is tolerable, mild. Morning stiffness <30 min. Tolerating medications well. Arthritis not limiting daily activities.     9/13/2022: more fatigue since increasing MTX to 25mg wily.  Joint pain at the MCPs and PIPs that is worse in the AM and improved with time and activity. Morning stiffness 20 min - 3 hours.      1/2/2023: Taking  methotrexate once weekly, folic acid daily, prednisone 2 mg daily, and is taken about 3 doses of Humira.  Feels like her arthritis is doing well at this time.  Humira has not been taken regularly because her whole family had a very significant flu infection, and now her family has COVID-19 infection.  She says that she tested positive for COVID-19 infection on Friday, testing only because her family had COVID-19 infection, but she did not have any symptoms until just recently and they are very mild nasal congestion symptoms so she prefers to monitor without treatment at this time.  No fevers or chills.    3/17/2023: Seen sooner than previously scheduled because of localized Humira injection site reaction.  Quarter-sized reaction to Humira, then the next dose resulted in a larger reaction with raised erythematous warm area that resolved after 2 days.  She uses Zyrtec daily for the past 2 years for seasonal allergies.  Seasonal allergies are worse this year.  Does not ice the injection site.  Arthritis is doing well.    4/28/2023: Humira is effective for arthritis.  Has been able to reduce prednisone to 1.5 mg daily.  However, injection site reactions are getting more intense, with edema, erythema, pain, and pruritus at the injection site.  Morning stiffness for less than 30 minutes.  Increased stress from work and other.     9/15/2023: Enbrel is effective for arthritis.  Overall doing well except for a mild flare of arthritis affecting the right third MCP where she had swelling of that finger for about 1 day, and pain for about 3 days, all improved with putting her hand in an ice water bath.  Biggest issue at this time is Enbrel injection site reactions where there is redness, pain, and pruritus lasting for a couple days each time.  Morning stiffness for less than 30 minutes.  Arthritis does not limit her daily activities.    12/15/2023: In November 2023 she notified this clinic that she stopped methotrexate because  she did not feel like it was helping her and she associated with brain fog and fatigue.  Was having flares in her hands, wrist, fingers.  Today, she reports doing well with regard to arthritis.  States that Orencia has finally become effective.  No swelling.  Morning stiffness for less than 30 minutes but says that it is hard to  morning stiffness because she has had arthritis for so long that she typically just ignores it.  Able to get her ring on and off without any issues; just 2 months ago she was having trouble getting her ring on and off so she would keep it off out of fear that she would not be able to get it off at any point.  Arthritis is not limiting her daily activities.  Quarter-sized erythema at the Orencia injection site that resolves within 24 hours and is not painful or pruritic and does not bother her.  Overall she is very happy with how well she is doing.  Tapering off prednisone currently on prednisone 1 mg daily.  She plans to continue tapering off prednisone.    Today, 3/29/2024: 2 weeks of neck pain that she thinks is because she slept poorly and she plans to see a person who helps with myofascial pain.  She says that her neck pain seems more muscular than joint.  Was able to reduce prednisone to 1 mg every other day and she ended up having mild ache in her hands that has since resolved.  Arthritis is not limiting her daily activities.  Still with quite a size pruritic Orencia injection site reaction last for about 1 day and is tolerable per patient.      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[facility] [employer]    ROS   12 point review of system was completed and negative except as noted in the HPI     Active Problem List     Patient Active Problem List   Diagnosis    Allergic rhinitis due to other allergen    Tobacco use disorder    Other acne    RUTHANN III (cervical intraepithelial neoplasia grade III) with severe  dysplasia    Adjustment disorder with anxiety    CARDIOVASCULAR SCREENING; LDL GOAL LESS THAN 160    Seropositive rheumatoid arthritis (H)     Past Medical History     Past Medical History:   Diagnosis Date    Abnormal Pap smear of cervix     greater than 5 yrs ago; leep     Depressive disorder, not elsewhere classified     Mental disorder     anxiety, depression    PAP SMEAR CERVIX W LGSIL 12/29/2006    Colpo HPV 6 and 16 DNA.-   RUTHANN 1 on of her cervical bx.   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 This is a more severe abnormality than indicated by the previous biopsies. I would recommend Pap smears as outlined. Please call. Alejandro Peter M.D. She will need follow-up paps at 4,8,12 months. If these are n     Past Surgical History     Past Surgical History:   Procedure Laterality Date    BIOPSY CERVICAL, LOCAL EXCISION, SINGLE/MULTIPLE      LEEP TX, CERVICAL  2/20/2007    RUTHANN I-II    OTHER SURGICAL HISTORY      tonsil    SURGICAL HISTORY OF -   6/22/2004    Left wrist arthroscopy plus triangular     WRIST SURGERY      left     Allergy     Allergies   Allergen Reactions    Hydroxychloroquine Other (See Comments)     Stomach upset, diarrhea     Current Medication List     Current Outpatient Medications   Medication Sig    Abatacept (ORENCIA CLICKJECT) 125 MG/ML SOAJ auto-injector Inject 1 mL (125 mg) Subcutaneous every 7 days .  Hold if infection and seek medical attention    Cetirizine HCl (ZYRTEC ALLERGY PO)     citalopram (CELEXA) 20 MG tablet Take 1.5 tablets (30 mg) by mouth daily    cyanocobalamin (VITAMIN B-12) 100 MCG tablet Take 100 mcg by mouth daily    diclofenac (VOLTAREN) 1 % topical gel Apply up to 2 grams of 1% gel to hands up to 4 times daily as needed for joint pain (maximum: 8 g per upper extremity per day)    hydrOXYzine (VISTARIL) 25 MG capsule Take 1 capsule (25 mg) by mouth 3 times daily as needed for  "anxiety (Patient not taking: Reported on 12/15/2023)    Omega-3 Fatty Acids (OMEGA 3 PO)     predniSONE (DELTASONE) 1 MG tablet Take 2 tablets (2 mg) by mouth daily    Vitamin D, Cholecalciferol, 25 MCG (1000 UT) CAPS Take 1,000 Units by mouth daily     No current facility-administered medications for this visit.         Social History   See HPI    Family History     Family History   Problem Relation Age of Onset    Hypertension Father     Allergies Father     Allergies Brother     Allergies Sister     Depression Mother     Depression Father     Hyperlipidemia Father     Pancreatic Cancer Paternal Aunt      Physical Exam     Temp Readings from Last 3 Encounters:   11/02/23 98.7  F (37.1  C)   12/04/22 98.1  F (36.7  C) (Tympanic)   04/17/09 97.8  F (36.6  C) (Tympanic)     BP Readings from Last 5 Encounters:   11/02/23 117/69   12/04/22 118/83   06/08/22 120/79   04/17/09 110/60   12/17/08 130/68     Pulse Readings from Last 1 Encounters:   11/02/23 77     Resp Readings from Last 1 Encounters:   No data found for Resp     Estimated body mass index is 23.05 kg/m  as calculated from the following:    Height as of 12/15/23: 1.689 m (5' 6.5\").    Weight as of 11/2/23: 65.8 kg (145 lb).    GEN: NAD. Healthy appearing adult.   HEENT:  Anicteric, noninjected sclera. No obvious external lesions of the ear and nose. Hearing intact.  PULM: No increased work of breathing  MSK:  Hands and wrists without swelling.   SKIN: No rash or jaundice seen  PSYCH: Alert. Appropriate.      Labs / Imaging (select studies)     CBC  Recent Labs   Lab Test 12/13/23  1005 09/13/23  0925 04/27/23  1455 07/30/21  1334 06/24/21  1141 01/12/21  1350 09/28/20  1332   WBC 6.6 5.9 6.2   < > 6.5 8.5 7.9   RBC 4.69 4.44 4.35   < > 4.60 4.55 4.58   HGB 14.5 14.1 13.8   < > 14.5 14.4 14.3   HCT 43.1 41.0 40.2   < > 41.8 42.7 41.8   MCV 92 92 92   < > 91 94 91   RDW 11.4 12.5 12.0   < > 12.1 11.4 11.6    199 204   < > 192 205 198   MCH 30.9 31.8 " 31.7   < > 31.5 31.6 31.2   MCHC 33.6 34.4 34.3   < > 34.7 33.7 34.2   NEUTROPHIL 56 56 49   < > 61.7 63.0 58.6   LYMPH 31 30 40   < > 28.8 26.3 27.4   MONOCYTE 10 11 9   < > 7.8 10.7 11.6   EOSINOPHIL 2 3 2   < > 1.4 0.0 1.9   BASOPHIL 1 1 1   < > 0.3 0.0 0.5   ANEU  --   --   --   --  4.0 5.4 4.6   ALYM  --   --   --   --  1.9 2.2 2.2   MARY  --   --   --   --  0.5 0.9 0.9   AEOS  --   --   --   --  0.1 0.0 0.2   ABAS  --   --   --   --  0.0 0.0 0.0   ANEUTAUTO 3.7 3.3 3.0   < >  --   --   --    ALYMPAUTO 2.0 1.8 2.5   < >  --   --   --    AMONOAUTO 0.6 0.6 0.5   < >  --   --   --    AEOSAUTO 0.2 0.2 0.1   < >  --   --   --    ABSBASO 0.0 0.0 0.0   < >  --   --   --     < > = values in this interval not displayed.     CMP  Recent Labs   Lab Test 12/13/23  1005 09/13/23  0925 04/27/23  1455 07/30/21  1334 06/24/21  1141 01/12/21  1350 09/28/20  1332   GLC 82 100* 103*   < >  --  88 83   CR 0.81 0.92 0.84   < > 0.78 0.77 0.79   GFRESTIMATED >90 82 >90   < > >90 >90 >90   GFRESTBLACK  --   --   --   --  >90 >90 >90   HA  --   --   --   --   --  9.1  --    BILITOTAL 0.6 0.6 1.0   < > 0.9 0.6 0.4   ALBUMIN 4.4 4.5 4.4   < > 4.0 4.2 3.8   PROTTOTAL 7.5 7.2 7.0   < > 7.4 8.2 7.5   ALKPHOS 69 54 55   < > 63 56 68   AST 16 17 15   < > 15 14 19   ALT 13 14 12   < > 22 15 18    < > = values in this interval not displayed.     Calcium/VitaminD  Recent Labs   Lab Test 01/12/21  1350   HA 9.1   VITDT 38     ESR/CRP  Recent Labs   Lab Test 12/13/23  1005 09/13/23  0925 04/27/23  1455 09/09/22  1122 07/27/22  1044 05/31/22  1452 01/03/22  1459   SED 5 5 5   < > 5 4 4   CRP  --   --   --   --  3.3 <2.9 <2.9   CRPI <3.00 <3.00 <3.00   < >  --   --   --     < > = values in this interval not displayed.     Lipid Panel  Recent Labs   Lab Test 11/02/23  1026   CHOL 148   TRIG 59   HDL 51   LDL 85   NHDL 97     Hepatitis B  Recent Labs   Lab Test 09/28/20  1332 02/28/17  1201   HBCAB Nonreactive  --    HEPBANG Nonreactive Negative      Hepatitis C  Recent Labs   Lab Test 09/28/20  1332 01/04/18  1039   HCVAB Nonreactive Negative     Lyme ab screening  Recent Labs   Lab Test 09/28/20  1332   LYMEGM 0.08     Tuberculosis Screening  Recent Labs   Lab Test 03/24/23  1118 10/26/22  0952 04/01/22  1427 02/23/21  1112 01/12/21  1350   TBRES Negative Negative Negative  --   --    TBRST  --   --   --  Negative Positive*     HIV Screening  Recent Labs   Lab Test 09/28/20  1332 02/28/17  1201   HIAGAB Nonreactive Negative     [facility] Labs reviewed in CareEverywhere:  11/27/2017: .3, .8  1/4/2018: HCV ab negative, SABRINA negative    Immunization History     Immunization History   Administered Date(s) Administered    COVID-19 MONOVALENT 12+ (Pfizer) 01/08/2021, 01/28/2021, 11/11/2021    COVID-19 Monovalent 12+ (Pfizer 2022) 06/15/2022    HPV 01/09/2007, 03/20/2007, 03/27/2008    HPV Quadrivalent 01/09/2007, 03/20/2007, 03/27/2008    Influenza (IIV3) PF 10/29/2010, 10/28/2011, 12/23/2013    Influenza Vaccine >6 months,quad, PF 10/23/2014, 10/02/2015, 11/02/2015, 10/19/2017, 10/05/2018, 11/08/2019, 09/24/2021, 10/07/2022    Influenza Vaccine IM Ages 6-35 Months 4 Valent (PF) 10/31/2016    Influenza Vaccine, 6+MO IM (QUADRIVALENT W/PRESERVATIVES) 10/19/2017    Influenza, seasonal, injectable, PF 12/23/2013    TDAP (Adacel,Boostrix) 08/14/2014, 07/25/2017    Tdap (Adult) Unspecified Formulation 06/19/1999          Chart documentation done in part with Dragon Voice recognition Software. Although reviewed after completion, some word and grammatical error may remain.      Video-Visit Details    Type of service:  Video Visit    Video Start Time: 10:31 AM    Video End Time: 10:40 AM    Originating Location (pt. Location): Home, [place]    Distant Location (provider location):  off site, [place]    Platform used for Video Visit: Suresh Toussaint MD

## 2024-03-29 NOTE — PATIENT INSTRUCTIONS
RHEUMATOLOGY    [facility]  [address]  [place] [address]    Phone number: [phone]  Fax number: [phone]    If you need a medication refill, please contact us as you may need lab work and/or a follow up visit prior to your refill.      Thank you for choosing [facility]!

## 2024-04-02 ENCOUNTER — ANCILLARY PROCEDURE (OUTPATIENT)
Dept: GENERAL RADIOLOGY | Facility: CLINIC | Age: 39
End: 2024-04-02
Attending: INTERNAL MEDICINE
Payer: COMMERCIAL

## 2024-04-02 DIAGNOSIS — M05.9 SEROPOSITIVE RHEUMATOID ARTHRITIS (H): ICD-10-CM

## 2024-04-02 DIAGNOSIS — M54.2 NECK PAIN: ICD-10-CM

## 2024-04-02 PROCEDURE — 72050 X-RAY EXAM NECK SPINE 4/5VWS: CPT | Mod: TC | Performed by: RADIOLOGY

## 2024-04-16 ENCOUNTER — PHARMACY VISIT (OUTPATIENT)
Dept: ADMINISTRATIVE | Facility: CLINIC | Age: 39
End: 2024-04-16

## 2024-04-16 NOTE — PROGRESS NOTES
Rheumatoid Arthritis Clinical Follow Up Assessment   Completed on  16:08:40 [facility], by Amairani Zambrano        Patient  Patient Name: JO ANN MEDRANO  : 1985  EHR ID: [id number]       Activity Date 2024/04/15     Activity Medications    ORENCIA        Care Details   ? Please select a health assessment questionnaire to conduct with patient: Select RAPID-3   ? RAPID-3   ? Dress yourself, including tying shoelaces and doing buttons?   ? Without any difficulty?????   ? Get in and out of bed?   ? With some difficulty?????   ? Lift a full cup or glass to your mouth?   ? Without any difficulty?????   ? Walk outdoors on flat ground?   ? With some difficulty?????   ? Wash and dry your entire body?   ? With some difficulty?????   ? Bend down to  clothing from the floor?   ? With some difficulty?????   ? Turn regular faucets on and off?   ? Without any difficulty?????   ? Get in and out of a car, bus, train or airplane?   ? Without any difficulty?????   ? Walk two miles or three kilometers, if you wish?   ? Without any difficulty?????   ? Participate in recreational activities and sports as you would like, if you wish?   ? With some difficulty?????   ? How much pain have you had because of your condition over the past week? Please indicate how severe your pain has been, on the scale from 0-10 scale (with 0 being no pain and 10 being pain as bad as it could be)   ? 4.5??????   ? Considering all the ways in which illness and health conditions may affect you at this time, please indicate how you are doing on the scale of 0-10 (with 0 being very well and 10 being very poorly)   ? 5.0??????   ? Please enter the RAPID-3 score. [QA Metric]   ? 11   ? Has the patient been on current medication for more than 6 months?   ? Yes        ? What are the patient's current rheumatoid arthritis symptoms?   ? Morning stiffness   ? Fatigue     ? What are the patient's goals for this therapy?   ? tolerability - ISRget off  prednisone - currently on 1mg - has been on for 6 yearsimprove physical activity level     ? Is patient meeting treatment goals?   ? Progressing toward goal     ? Please enter patient's PDC, PDC = adherence metric. Goal > 0.8 (80% adherence) [QA Metric]   ? 0.95     ? Based on the patient's report or refill record over the last 6-12 months, does the patient appear to be taking less than 80% of their medication? [QA Metric]   ? No          Summary Notes   Geovanna is feeling a little under the weather today, no fever, whole family is dealing with a cough and she had to stay in bed yesterday. She is up and doing work around the house today which helps loosen up her joints and feel better. Her main sx are coming from her index fingers, a little bit in her feet, and her back is bothering her but attributes that to taking some time to recover in bed yesterday. She said she still gets injection site reaction after Orencia but not as bad as they were initially with swelling. Talked about topical hydrocortisone and icing if she'd like to try some side effect mitigation strategies. For Rapid-3 total score is 11, right on the low/moderate disease activity which she agreed with since her sx go back and forth. Still tapering down on prednisone 1mg, on every other day dosing currently. She has her next apt with Dr. Toussaint in ~6 months and feels ok with therapy plan right now. Hasn't met treatment goals but feels like it must be doing something. She was able to go on a couple mile walk with a friend and has golfed a couple times, some aches and pains from just getting back into it but no major sx flairs. For pain she is at 5/10 with back right now but said she is feeling better as she moves around more today. She doesn't usually take any pain meds unless she has to. 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

## 2024-07-12 ENCOUNTER — VIRTUAL VISIT (OUTPATIENT)
Dept: RHEUMATOLOGY | Facility: CLINIC | Age: 39
End: 2024-07-12
Payer: COMMERCIAL

## 2024-07-12 DIAGNOSIS — M05.9 SEROPOSITIVE RHEUMATOID ARTHRITIS (H): Primary | ICD-10-CM

## 2024-07-12 PROCEDURE — 99214 OFFICE O/P EST MOD 30 MIN: CPT | Mod: 95 | Performed by: INTERNAL MEDICINE

## 2024-07-12 PROCEDURE — G2211 COMPLEX E/M VISIT ADD ON: HCPCS | Mod: 95 | Performed by: INTERNAL MEDICINE

## 2024-07-12 RX ORDER — ABATACEPT 125 MG/ML
125 INJECTION, SOLUTION SUBCUTANEOUS
Qty: 4 ML | Refills: 6 | Status: SHIPPED | OUTPATIENT
Start: 2024-07-12

## 2024-07-12 NOTE — PATIENT INSTRUCTIONS
RHEUMATOLOGY    [facility] [place]  [address] [place] [address]  [place] [address]    Phone number: [phone]  Fax number: [phone]    If you need a medication refill, please contact us as you may need lab work and/or a follow up visit prior to your refill.      Thank you for choosing [facility]!    Sheyla Stover CMA Rheumatology

## 2024-07-12 NOTE — PROGRESS NOTES
Geovanna Schwartz  is a 38 year old year old who is being evaluated via a billable video visit.      How would you like to obtain your AVS? MyChart  If the video visit is dropped, the invitation should be resent by: Text to cell phone: [phone]   Will anyone else be joining your video visit? No      Rheumatology Video Visit      Geovanna Schwartz MRN# [id number]   YOB: 1985 Age: 38 year old      Date of visit: 7/12/24   PCP: Dr. Vicky Hu at [facility]    Chief Complaint   Patient presents with:  Rheumatoid Arthritis: No longer taking prednisone    Assessment and Plan     1. Seropositive Nonerosive Rheumatoid Arthritis (.3, .8): Dx'd 2017.  Established care with me on 9/24/2020, at which time she had swelling of the bilateral second MCPs and wrists, and was on prednisone 2.5 mg daily.  Previously on HCQ (GI upset), SSZ (partially effective; she thought methotrexate was replacing sulfasalazine so stopped it previously when it was supposed to be combination therapy; she notes that she has a hard time taking twice daily medications), Humira (effective, injection site reactions), MTX (brain fog, fatigue, partial efficacy; stopped when on orencia and continued to do well on Orencia). Currently Orencia 125 mg SQ every 7 days.  Doing well at this time and was able to taper off of prednisone.  On some weeks he will have pain in her wrists the night before Orencia injection that responds well to icing and ibuprofen.  Okay to continue ibuprofen 200 mg on the day before Orencia injection, if needed.   Chronic illness, stable.    - Continue Orencia 125 mg SQ every 7 days  - Labs 2-3 days prior to December follow-up: CBC, Creatinine, Hepatic Panel, ESR, CRP, QuantiFERON-TB gold plus    # Pregnancy planning: IUD; she verbalized understanding that DMARDs will need adjustment at least 3 months prior to conception    2.  Historical: Neck pain in the setting of rheumatoid arthritis: 2-week  duration of neck pain reported in March 202. that resolved spontaneously.  X-rays were checked that did not show evidence of instability    3. Low vitamin D: Was taking vitamin D 1000 IU daily, OTC, but is now taking 5000 IU daily at the direction of the naturopath provider  - Labs 2-3 days prior to December follow-up: Vitamin D    4.  Vaccinations: Vaccinations reviewed with Ms. Schwartz.  Risks and benefits of vaccinations were discussed.  Okay to not hold Orencia after COVID-19 vaccination.  Reminded her that if she has a live vaccine then Orencia will need to be held and she should reach out to this clinic for direction.  - Influenza: encouraged yearly vaccination  - [id number]: Advised vaccination  - COVID-19: Advised keeping up-to-date.  Patient is concerned about flaring symptoms if Orencia is held.  Previously discussed rationale for holding Orencia versus not holding Orencia; ultimately, with shared decision making decided to not hold Orencia after COVID-19 vaccination    Total minutes spent in evaluation with patient, documentation, , and review of pertinent studies and chart notes: 14  The longitudinal plan of care for the rheumatology problem(s) were addressed during this visit.  Due to added complexity of care, we will continue to support the patient and the subsequent management of this condition with ongoing continuity of care.     Ms. Schwartz verbalized agreement with and understanding of the rational for the diagnosis and treatment plan.  All questions were answered to best of my ability and the patient's satisfaction. Ms. Schwartz was advised to contact the clinic with any questions that may arise after the clinic visit.      Thank you for involving me in the care of the patient    Return to clinic: December 2024    HPI   Geovanna Schwartz is a 38 year old female with a past medical history significant for allergic rhinitis, tobacco use, and rheumatoid arthritis who is seen  for follow-up of rheumatoid arthritis.    6/7/2019 [facility] rheumatology clinic note by Dr. Wade Weienr document seropositive rheumatoid arthritis on prednisone 2.5 mg daily and sulfasalazine 1000 mg twice daily    9/24/2020: Ms. Schwartz reports that she has RA.  On prednisone 2.5mg daily now and it helps.  Previously on SSZ 1000mg daily when she was following with [facility] rheumatology and this was effective but not completely controlling symptoms so she was going to increase to BID but then lost to follow-up due to insurance change. Wrists and 2nd MCPs are the most affected joints; intermittent pain and stiffness in these joints; worse in the AM.  If very swollen in these joints then she doesn't want to move them at all, but when able to move without much pain then she feels better and better with increased physical activity. Felt better when on SSZ but not complete control when on SSZ once daily; so she had just started SSZ BID dosing before changing insurance and then not having rheumatology follow-up .  HCQ was used without much improvement and she had GI upset with it; started 3 years ago when first dx'd and breastfeeding.  Currently not planning to have additional children. Birth control with IUD.   Morning stiffness for >45 min.  Was on vitamin D but has stopped.     1/6/2021: Sulfasalazine has been beneficial but she still has active joint symptoms primarily in her wrists and second MCPs.  She states that her symptoms are much more tolerable and she has more better days than she had in the past but she still has these active symptoms.  Pregnancy plans are not yet determined with her  so she is going to have this conversation with him soon.    4/14/2021: Pain in her shoulders if she raises her arms above her head, worse with abduction.  Wrists, second MCPs and MTPs are achy, worse in the morning improves time and activity.  Morning stiffness for approximately 30-45 minutes.  She has discussed conception  planning with her  and they are planning for no more children so she will continue with the IUD.  She states that she would like to start methotrexate.    7/14/2021: Mild pain at the right third MCP for the past 2 days without swelling or increased warmth.  Also mild pain at the left fifth MTP.  Both of these joints have improved with initiation of methotrexate, that was started on May 18, 2021.  Other joints are doing well.  Morning stiffness for about 30 minutes.  No gelling phenomenon.  Tolerating medications well.    11/16/2021: Geovanna Schwartz was unable to connect by video due to her internet connection that she suspects was poor due to it being windy outside and having satellite Internet; she wanted changed to telephone visit.  Doing well with regard to arthritis as long as she remains on prednisone.  She was able to stop prednisone 2.5 mg daily for about 2 weeks before her symptoms started to return so she will be started prednisone and has been doing well since that time.  She also informs me today that she is not taking sulfasalazine and has not been since she started methotrexate because she thought methotrexate was replacing sulfasalazine.  Currently without joint pain or swelling.  No morning stiffness or gelling phenomenon.    3/8/2022: Currently doing well.  A bit more achy since reducing prednisone from 2.5 mg daily, to 2.5 mg every other day and has been doing for so for more than 1 week now.  Tolerating methotrexate well.  Arthritis is not limiting her daily activities.  Morning stiffness for less than 20 minutes.    6/8/2022: achy when reducing prednisone to 2mg daily but is tolerable, mild. Morning stiffness <30 min. Tolerating medications well. Arthritis not limiting daily activities.     9/13/2022: more fatigue since increasing MTX to 25mg wily.  Joint pain at the MCPs and PIPs that is worse in the AM and improved with time and activity. Morning stiffness 20 min - 3 hours.       1/2/2023: Taking methotrexate once weekly, folic acid daily, prednisone 2 mg daily, and is taken about 3 doses of Humira.  Feels like her arthritis is doing well at this time.  Humira has not been taken regularly because her whole family had a very significant flu infection, and now her family has COVID-19 infection.  She says that she tested positive for COVID-19 infection on Friday, testing only because her family had COVID-19 infection, but she did not have any symptoms until just recently and they are very mild nasal congestion symptoms so she prefers to monitor without treatment at this time.  No fevers or chills.    3/17/2023: Seen sooner than previously scheduled because of localized Humira injection site reaction.  Quarter-sized reaction to Humira, then the next dose resulted in a larger reaction with raised erythematous warm area that resolved after 2 days.  She uses Zyrtec daily for the past 2 years for seasonal allergies.  Seasonal allergies are worse this year.  Does not ice the injection site.  Arthritis is doing well.    4/28/2023: Humira is effective for arthritis.  Has been able to reduce prednisone to 1.5 mg daily.  However, injection site reactions are getting more intense, with edema, erythema, pain, and pruritus at the injection site.  Morning stiffness for less than 30 minutes.  Increased stress from work and other.     9/15/2023: Enbrel is effective for arthritis.  Overall doing well except for a mild flare of arthritis affecting the right third MCP where she had swelling of that finger for about 1 day, and pain for about 3 days, all improved with putting her hand in an ice water bath.  Biggest issue at this time is Enbrel injection site reactions where there is redness, pain, and pruritus lasting for a couple days each time.  Morning stiffness for less than 30 minutes.  Arthritis does not limit her daily activities.    12/15/2023: In November 2023 she notified this clinic that she stopped  methotrexate because she did not feel like it was helping her and she associated with brain fog and fatigue.  Was having flares in her hands, wrist, fingers.  Today, she reports doing well with regard to arthritis.  States that Orencia has finally become effective.  No swelling.  Morning stiffness for less than 30 minutes but says that it is hard to  morning stiffness because she has had arthritis for so long that she typically just ignores it.  Able to get her ring on and off without any issues; just 2 months ago she was having trouble getting her ring on and off so she would keep it off out of fear that she would not be able to get it off at any point.  Arthritis is not limiting her daily activities.  Quarter-sized erythema at the Orencia injection site that resolves within 24 hours and is not painful or pruritic and does not bother her.  Overall she is very happy with how well she is doing.  Tapering off prednisone currently on prednisone 1 mg daily.  She plans to continue tapering off prednisone.    3/29/2024: 2 weeks of neck pain that she thinks is because she slept poorly and she plans to see a person who helps with myofascial pain.  She says that her neck pain seems more muscular than joint.  Was able to reduce prednisone to 1 mg every other day and she ended up having mild ache in her hands that has since resolved.  Arthritis is not limiting her daily activities.  Still with quite a size pruritic Orencia injection site reaction last for about 1 day and is tolerable per patient.    Today, 7/12/2024: Taking vitamin D 5000 IU daily a duration of a natural path provider.  Wrist pain on the night prior to taking Orencia, mild, not interfering with daily activities, and resolves with icing; if she uses ibuprofen and the pain resolves as well; topical Voltaren gel is not effective.  Her symptoms are not severe enough to need a change in RA treatment per patient.  Her symptoms do not occur every week.  Other  joints are doing well.  Overall happy with how well she is doing.    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 [employer]    ROS   12 point review of system was completed and negative except as noted in the HPI     Active Problem List     Patient Active Problem List   Diagnosis    Allergic rhinitis due to other allergen    Tobacco use disorder    Other acne    RUTHANN III (cervical intraepithelial neoplasia grade III) with severe dysplasia    Adjustment disorder with anxiety    CARDIOVASCULAR SCREENING; LDL GOAL LESS THAN 160    Seropositive rheumatoid arthritis (H)     Past Medical History     Past Medical History:   Diagnosis Date    Abnormal Pap smear of cervix     greater than 5 yrs ago; leep     Depressive disorder, not elsewhere classified     Mental disorder     anxiety, depression    PAP SMEAR CERVIX W LGSIL 12/29/2006    Colpo HPV 6 and 16 DNA.-   RUTHANN 1 on of her cervical bx.   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 This is a more severe abnormality than indicated by the previous biopsies. I would recommend Pap smears as outlined. Please call. Alejandro ePter M.D. She will need follow-up paps at 4,8,12 months. If these are n     Past Surgical History     Past Surgical History:   Procedure Laterality Date    BIOPSY CERVICAL, LOCAL EXCISION, SINGLE/MULTIPLE      LEEP TX, CERVICAL  2/20/2007    RUTHANN I-II    OTHER SURGICAL HISTORY      tonsil    SURGICAL HISTORY OF -   6/22/2004    Left wrist arthroscopy plus triangular     WRIST SURGERY      left     Allergy     Allergies   Allergen Reactions    Hydroxychloroquine Other (See Comments)     Stomach upset, diarrhea     Current Medication List     Current Outpatient Medications   Medication Sig Dispense Refill    Abatacept (ORENCIA CLICKJECT) 125 MG/ML SOAJ auto-injector Inject 1 mL (125 mg) Subcutaneous  "every 7 days .  Hold if infection and seek medical attention 4 mL 4    Cetirizine HCl (ZYRTEC ALLERGY PO)       citalopram (CELEXA) 20 MG tablet Take 1.5 tablets (30 mg) by mouth daily 150 tablet 3    cyanocobalamin (VITAMIN B-12) 100 MCG tablet Take 100 mcg by mouth daily      diclofenac (VOLTAREN) 1 % topical gel Apply up to 2 grams of 1% gel to hands up to 4 times daily as needed for joint pain (maximum: 8 g per upper extremity per day) 200 g 1    Omega-3 Fatty Acids (OMEGA 3 PO)       Vitamin D, Cholecalciferol, 25 MCG (1000 UT) CAPS Take 1,000 Units by mouth daily 90 capsule 12    hydrOXYzine (VISTARIL) 25 MG capsule Take 1 capsule (25 mg) by mouth 3 times daily as needed for anxiety (Patient not taking: Reported on 12/15/2023) 30 capsule 3    predniSONE (DELTASONE) 1 MG tablet Take 1 tablet (1 mg) by mouth every other day (Patient not taking: Reported on 7/12/2024) 45 tablet 1     No current facility-administered medications for this visit.         Social History   See HPI    Family History     Family History   Problem Relation Age of Onset    Hypertension Father     Allergies Father     Allergies Brother     Allergies Sister     Depression Mother     Depression Father     Hyperlipidemia Father     Pancreatic Cancer Paternal Aunt      Physical Exam     Temp Readings from Last 3 Encounters:   11/02/23 98.7  F (37.1  C)   12/04/22 98.1  F (36.7  C) (Tympanic)   04/17/09 97.8  F (36.6  C) (Tympanic)     BP Readings from Last 5 Encounters:   11/02/23 117/69   12/04/22 118/83   06/08/22 120/79   04/17/09 110/60   12/17/08 130/68     Pulse Readings from Last 1 Encounters:   11/02/23 77     Resp Readings from Last 1 Encounters:   No data found for Resp     Estimated body mass index is 23.05 kg/m  as calculated from the following:    Height as of 12/15/23: 1.689 m (5' 6.5\").    Weight as of 11/2/23: 65.8 kg (145 lb).    GEN: NAD. Healthy appearing adult.   HEENT:  Anicteric, noninjected sclera. No obvious external " lesions of the ear and nose. Hearing intact.  PULM: No increased work of breathing  MSK:  Hands and wrists without swelling.   SKIN: No rash or jaundice seen  PSYCH: Alert. Appropriate.      Labs / Imaging (select studies)       CBC  Recent Labs   Lab Test 12/13/23  1005 09/13/23  0925 04/27/23  1455 07/30/21  1334 06/24/21  1141 01/12/21  1350 09/28/20  1332   WBC 6.6 5.9 6.2   < > 6.5 8.5 7.9   RBC 4.69 4.44 4.35   < > 4.60 4.55 4.58   HGB 14.5 14.1 13.8   < > 14.5 14.4 14.3   HCT 43.1 41.0 40.2   < > 41.8 42.7 41.8   MCV 92 92 92   < > 91 94 91   RDW 11.4 12.5 12.0   < > 12.1 11.4 11.6    199 204   < > 192 205 198   MCH 30.9 31.8 31.7   < > 31.5 31.6 31.2   MCHC 33.6 34.4 34.3   < > 34.7 33.7 34.2   NEUTROPHIL 56 56 49   < > 61.7 63.0 58.6   LYMPH 31 30 40   < > 28.8 26.3 27.4   MONOCYTE 10 11 9   < > 7.8 10.7 11.6   EOSINOPHIL 2 3 2   < > 1.4 0.0 1.9   BASOPHIL 1 1 1   < > 0.3 0.0 0.5   ANEU  --   --   --   --  4.0 5.4 4.6   ALYM  --   --   --   --  1.9 2.2 2.2   MARY  --   --   --   --  0.5 0.9 0.9   AEOS  --   --   --   --  0.1 0.0 0.2   ABAS  --   --   --   --  0.0 0.0 0.0   ANEUTAUTO 3.7 3.3 3.0   < >  --   --   --    ALYMPAUTO 2.0 1.8 2.5   < >  --   --   --    AMONOAUTO 0.6 0.6 0.5   < >  --   --   --    AEOSAUTO 0.2 0.2 0.1   < >  --   --   --    ABSBASO 0.0 0.0 0.0   < >  --   --   --     < > = values in this interval not displayed.     CMP  Recent Labs   Lab Test 12/13/23  1005 09/13/23  0925 04/27/23  1455 07/30/21  1334 06/24/21  1141 01/12/21  1350 09/28/20  1332   GLC 82 100* 103*   < >  --  88 83   CR 0.81 0.92 0.84   < > 0.78 0.77 0.79   GFRESTIMATED >90 82 >90   < > >90 >90 >90   GFRESTBLACK  --   --   --   --  >90 >90 >90   HA  --   --   --   --   --  9.1  --    BILITOTAL 0.6 0.6 1.0   < > 0.9 0.6 0.4   ALBUMIN 4.4 4.5 4.4   < > 4.0 4.2 3.8   PROTTOTAL 7.5 7.2 7.0   < > 7.4 8.2 7.5   ALKPHOS 69 54 55   < > 63 56 68   AST 16 17 15   < > 15 14 19   ALT 13 14 12   < > 22 15 18    < > =  values in this interval not displayed.     Calcium/VitaminD  Recent Labs   Lab Test 01/12/21  1350   HA 9.1   VITDT 38     ESR/CRP  Recent Labs   Lab Test 12/13/23  1005 09/13/23  0925 04/27/23  1455 09/09/22  1122 07/27/22  1044 05/31/22  1452 01/03/22  1459   SED 5 5 5   < > 5 4 4   CRP  --   --   --   --  3.3 <2.9 <2.9   CRPI <3.00 <3.00 <3.00   < >  --   --   --     < > = values in this interval not displayed.     Lipid Panel  Recent Labs   Lab Test 11/02/23  1026   CHOL 148   TRIG 59   HDL 51   LDL 85   NHDL 97     Hepatitis B  Recent Labs   Lab Test 09/28/20  1332 02/28/17  1201   HBCAB Nonreactive  --    HEPBANG Nonreactive Negative     Hepatitis C  Recent Labs   Lab Test 09/28/20  1332 01/04/18  1039   HCVAB Nonreactive Negative     Lyme ab screening  Recent Labs   Lab Test 09/28/20  1332   LYMEGM 0.08     Tuberculosis Screening  Recent Labs   Lab Test 03/24/23  1118 10/26/22  0952 04/01/22  1427 02/23/21  1112 01/12/21  1350   TBRES Negative Negative Negative  --   --    TBRST  --   --   --  Negative Positive*     HIV Screening  Recent Labs   Lab Test 09/28/20  1332 02/28/17  1201   HIAGAB Nonreactive Negative         [facility] Labs reviewed in CareEverywhere:  11/27/2017: .3, .8  1/4/2018: HCV ab negative, SABRINA negative    Immunization History     Immunization History   Administered Date(s) Administered    COVID-19 MONOVALENT 12+ (Pfizer) 01/08/2021, 01/28/2021, 11/11/2021    COVID-19 Monovalent 12+ (Pfizer 2022) 06/15/2022    HPV 01/09/2007, 03/20/2007, 03/27/2008    HPV Quadrivalent 01/09/2007, 03/20/2007, 03/27/2008    Influenza (IIV3) PF 10/29/2010, 10/28/2011, 12/23/2013    Influenza Vaccine >6 months,quad, PF 10/23/2014, 10/02/2015, 11/02/2015, 10/19/2017, 10/05/2018, 11/08/2019, 09/24/2021, 10/07/2022    Influenza Vaccine IM Ages 6-35 Months 4 Valent (PF) 10/31/2016    Influenza Vaccine, 6+MO IM (QUADRIVALENT W/PRESERVATIVES) 10/19/2017    Influenza, seasonal, injectable, PF 12/23/2013     TDAP (Adacel,Boostrix) 08/14/2014, 07/25/2017    Tdap (Adult) Unspecified Formulation 06/19/1999          Chart documentation done in part with Dragon Voice recognition Software. Although reviewed after completion, some word and grammatical error may remain.    Video-Visit Details    Type of service:  Video Visit    Video Start Time: 11:08 AM    Video End Time: 11:20 AM    Originating Location (pt. Location): Home, [place]    Distant Location (provider location):  off site, [place]    Platform used for Video Visit: Chivo Toussaint MD

## 2024-08-12 ENCOUNTER — TELEPHONE (OUTPATIENT)
Dept: RHEUMATOLOGY | Facility: CLINIC | Age: 39
End: 2024-08-12
Payer: COMMERCIAL

## 2024-08-12 NOTE — TELEPHONE ENCOUNTER
Hello,    Just wanting to inform that this pt has now exhausted all the available funds ($15,000.80) for Orencia. We have advised pt to reach out to Orencia support, but wanting to keep you in the loop so you can try to see of other options that are available to help pt out. Her current copay is $862.16 and she stated that she is unable to afford that. Thank you!

## 2024-08-13 NOTE — TELEPHONE ENCOUNTER
Sent [employer] message to patient to offer FPAP.    Thank You,     Ghislaine Vargas [facility]  Specialty Pharmacy Clinic [facility] Specialty  [email or web address]  [email or web address]  Phone: [phone]  Fax: [phone]

## 2024-08-14 NOTE — TELEPHONE ENCOUNTER
FPAP eligibility:    Household size: 4 (2 dependents)  Annual income: 140,000    Income limit: $156,000    Geovanna is going to email me her family 2023 Federal 1040 to review and apply for FPAP.    If patient is eligible application is URGENT - PATIENT IS OUT OF MEDICATION.    Thank You,     Ghislaine Vargas [facility]  Specialty Pharmacy Clinic [facility] Specialty  [email or web address]  [email or web address]  Phone: [phone]  Fax: [phone]

## 2024-08-14 NOTE — TELEPHONE ENCOUNTER
Patient called Orencia for additional funding once copay card was exhausted and was unable to reach a representative, therefore liaison was contacted regarding [place] Patient Assistance Program.    Called [phone] ([phone]):    Transferred to Orencia Copay card support line -         Called Orencia copay card support line [phone]  Asked representative to confirm if additional funding is available for patient with commercial insurance after exhausting $15,000 on copay card  Currently copay card is applying remaining balance of $1,050.73 at pharmacy and leaving patient with $862.16 copay and patient stated that she is unable to afford that.   Representative confirmed she can see that remaining balance of $1,050 is what is left of the $15,000 that was provided for 2024 Orencia copay card.  Representative ran investigation and has emailed patient a maxed out letter. The 's alternative option for funding: ORENCIA DEBIT CARD  [employer] IS SENDING ADDITIONAL $15,000 IN FUNDING TO A NEW ORENCIA DEBIT CARD TO PATIENT EMAIL ADDRESS [email or web address]  THIS ALTERNATIVE FUNDING PROGRAM IS NEW AUGUST 2024  ONE TIME EXCEPTION PER LIFETIME OF PROGRAM FOR ADDITIONAL FUNDING OPTION.   KATEY - geno weems this additional debit card funding is new as of 08/2024 as a result of their patients historically running out of funding and it causing them to stop medication.      Called patient and left voicemail that she must call [phone] to activate her virtual Orencia Debit card with an additional $15,000 in funding which was approved today. Once she does this she can request the pharmacy process with her new card information.    Thank You,     Cesar Vargas [facility]  Specialty Pharmacy Clinic [facility] Specialty  [email or web address]  [email or web address]  Phone: [phone]  Fax: [phone]

## 2024-08-14 NOTE — TELEPHONE ENCOUNTER
Patient responded saying that she may be eligible and is interested in applying.    Called patient and LVM to call me back to discuss.    Thank You,     Ghislaine Vargas [facility]  Specialty Pharmacy Clinic Liaison [facility] Specialty  [email or web address]  [email or web address]  Phone: [phone]  Fax: [phone]

## 2024-08-15 NOTE — TELEPHONE ENCOUNTER
Sent patient [employer] message to call Orencia [employer] card support line [phone] to activate her virtual debit card with an additional $15,000 in funding.    Thank You,     Ghislaine Vargas [facility]  Specialty Pharmacy Clinic [facility] Specialty  [email or web address]  [email or web address]  Phone: [phone]  Fax: [phone]

## 2024-08-16 NOTE — TELEPHONE ENCOUNTER
Order is in process at [facility].     Closing encounter.    Thank You,     Ghislaine Vargas [facility]  Specialty Pharmacy Clinic [facility] Specialty  [email or web address]  [email or web address]  Phone: [phone]  Fax: [phone]

## 2024-10-03 ENCOUNTER — PATIENT OUTREACH (OUTPATIENT)
Dept: CARE COORDINATION | Facility: CLINIC | Age: 39
End: 2024-10-03
Payer: COMMERCIAL

## 2024-10-17 ENCOUNTER — PATIENT OUTREACH (OUTPATIENT)
Dept: CARE COORDINATION | Facility: CLINIC | Age: 39
End: 2024-10-17
Payer: COMMERCIAL

## 2024-11-06 ENCOUNTER — OFFICE VISIT (OUTPATIENT)
Dept: FAMILY MEDICINE | Facility: CLINIC | Age: 39
End: 2024-11-06
Payer: COMMERCIAL

## 2024-11-06 VITALS
SYSTOLIC BLOOD PRESSURE: 117 MMHG | BODY MASS INDEX: 25.3 KG/M2 | RESPIRATION RATE: 16 BRPM | DIASTOLIC BLOOD PRESSURE: 74 MMHG | HEIGHT: 66 IN | WEIGHT: 157.4 LBS | OXYGEN SATURATION: 99 % | HEART RATE: 86 BPM

## 2024-11-06 DIAGNOSIS — F43.22 ADJUSTMENT DISORDER WITH ANXIOUS MOOD: ICD-10-CM

## 2024-11-06 DIAGNOSIS — Z00.00 ROUTINE GENERAL MEDICAL EXAMINATION AT A HEALTH CARE FACILITY: Primary | ICD-10-CM

## 2024-11-06 DIAGNOSIS — M05.9 SEROPOSITIVE RHEUMATOID ARTHRITIS (H): ICD-10-CM

## 2024-11-06 PROCEDURE — 99395 PREV VISIT EST AGE 18-39: CPT | Performed by: FAMILY MEDICINE

## 2024-11-06 RX ORDER — CITALOPRAM HYDROBROMIDE 20 MG/1
30 TABLET ORAL DAILY
Qty: 150 TABLET | Refills: 3 | Status: SHIPPED | OUTPATIENT
Start: 2024-11-06

## 2024-11-06 SDOH — HEALTH STABILITY: PHYSICAL HEALTH: ON AVERAGE, HOW MANY DAYS PER WEEK DO YOU ENGAGE IN MODERATE TO STRENUOUS EXERCISE (LIKE A BRISK WALK)?: 2 DAYS

## 2024-11-06 ASSESSMENT — PATIENT HEALTH QUESTIONNAIRE - PHQ9
SUM OF ALL RESPONSES TO PHQ QUESTIONS 1-9: 2
10. IF YOU CHECKED OFF ANY PROBLEMS, HOW DIFFICULT HAVE THESE PROBLEMS MADE IT FOR YOU TO DO YOUR WORK, TAKE CARE OF THINGS AT HOME, OR GET ALONG WITH OTHER PEOPLE: NOT DIFFICULT AT ALL
SUM OF ALL RESPONSES TO PHQ QUESTIONS 1-9: 2

## 2024-11-06 ASSESSMENT — ANXIETY QUESTIONNAIRES
2. NOT BEING ABLE TO STOP OR CONTROL WORRYING: NOT AT ALL
GAD7 TOTAL SCORE: 0
GAD7 TOTAL SCORE: 0
4. TROUBLE RELAXING: NOT AT ALL
7. FEELING AFRAID AS IF SOMETHING AWFUL MIGHT HAPPEN: NOT AT ALL
IF YOU CHECKED OFF ANY PROBLEMS ON THIS QUESTIONNAIRE, HOW DIFFICULT HAVE THESE PROBLEMS MADE IT FOR YOU TO DO YOUR WORK, TAKE CARE OF THINGS AT HOME, OR GET ALONG WITH OTHER PEOPLE: NOT DIFFICULT AT ALL
8. IF YOU CHECKED OFF ANY PROBLEMS, HOW DIFFICULT HAVE THESE MADE IT FOR YOU TO DO YOUR WORK, TAKE CARE OF THINGS AT HOME, OR GET ALONG WITH OTHER PEOPLE?: NOT DIFFICULT AT ALL
1. FEELING NERVOUS, ANXIOUS, OR ON EDGE: NOT AT ALL
7. FEELING AFRAID AS IF SOMETHING AWFUL MIGHT HAPPEN: NOT AT ALL
GAD7 TOTAL SCORE: 0
3. WORRYING TOO MUCH ABOUT DIFFERENT THINGS: NOT AT ALL
5. BEING SO RESTLESS THAT IT IS HARD TO SIT STILL: NOT AT ALL
6. BECOMING EASILY ANNOYED OR IRRITABLE: NOT AT ALL

## 2024-11-06 ASSESSMENT — SOCIAL DETERMINANTS OF HEALTH (SDOH): HOW OFTEN DO YOU GET TOGETHER WITH FRIENDS OR RELATIVES?: ONCE A WEEK

## 2024-11-06 NOTE — PROGRESS NOTES
Answers submitted by the patient for this visit:  Patient Health Questionnaire (Submitted on 11/6/2024)  If you checked off any problems, how difficult have these problems made it for you to do your work, take care of things at home, or get along with other people?: Not difficult at all  PHQ9 TOTAL SCORE: 2  Patient Health Questionnaire (G7) (Submitted on 11/6/2024)  BRANDON 7 TOTAL SCORE: 0  Preventive Care Visit  [facility]  Vicky Hu MD, Family Medicine  Nov 6, 2024      1. Routine general medical examination at a health care facility (Primary)  Geovanna comes in today for her annual exam.  As far as healthcare maintenance, she had a normal Pap smear in 2023 so be due in 2026.  She get her flu shot at work.  Her IUD is good now until 2026.    2. Adjustment disorder with anxious mood  Anxiety is well-controlled with her current dose of citalopram.  - citalopram (CELEXA) 20 MG tablet; Take 1.5 tablets (30 mg) by mouth daily.  Dispense: 150 tablet; Refill: 3    3. Seropositive rheumatoid arthritis (H)  She follows with rheumatology and is currently on Orencia.      Subjective   Geovanna is a 39 year old, presenting for the following:  Physical        11/6/2024     2:40 PM   Additional Questions   Roomed by           HPI  She comes in today for her annual exam.  She is overall doing well.  She feels like her anxiety is under good control.        Health Care Directive  Patient does not have a Health Care Directive: Discussed advance care planning with patient; information given to patient to review.      11/6/2024   General Health   How would you rate your overall physical health? (!) FAIR   Feel stress (tense, anxious, or unable to sleep) Not at all            11/6/2024   Nutrition   Three or more servings of calcium each day? Yes   Diet: Regular (no restrictions)   How many servings of fruit and vegetables per day? (!) 2-3   How many sweetened beverages each day? 0-1            11/6/2024    Exercise   Days per week of moderate/strenous exercise 2 days      (!) EXERCISE CONCERN      11/6/2024   Social Factors   Frequency of gathering with friends or relatives Once a week   Worry food won't last until get money to buy more No   Food not last or not have enough money for food? No   Do you have housing? (Housing is defined as stable permanent housing and does not include staying ouside in a car, in a tent, in an abandoned building, in an overnight shelter, or couch-surfing.) Yes   Are you worried about losing your housing? No   Lack of transportation? No   Unable to get utilities (heat,electricity)? No            11/6/2024   Dental   Dentist two times every year? Yes             Today's PHQ-9 Score:       11/6/2024     2:38 PM   PHQ-9 SCORE   PHQ-9 Total Score MyChart 2 (Minimal depression)   PHQ-9 Total Score 2        Patient-reported         11/6/2024   Substance Use   Alcohol more than 3/day or more than 7/wk No   Do you use any other substances recreationally? No        Social History     Tobacco Use    Smoking status: Some Days     Types: Cigarettes    Smokeless tobacco: Never    Tobacco comments:     smokes when she drinks   Vaping Use    Vaping status: Never Used   Substance Use Topics    Alcohol use: Yes     Comment: occasional    Drug use: No          Answers submitted by the patient for this visit:  Patient Health Questionnaire (Submitted on 11/6/2024)  If you checked off any problems, how difficult have these problems made it for you to do your work, take care of things at home, or get along with other people?: Not difficult at all  PHQ9 TOTAL SCORE: 2  Patient Health Questionnaire (G7) (Submitted on 11/6/2024)  BRANDON 7 TOTAL SCORE: 0          11/6/2024   STI Screening   New sexual partner(s) since last STI/HIV test? No        History of abnormal Pap smear: YES - reflected in Problem List and Health Maintenance accordingly        Latest Ref Rng & Units 11/2/2023    10:24 AM 2/28/2017    12:01 PM  "12/17/2008    12:00 AM   PAP / HPV   PAP  Negative for Intraepithelial Lesion or Malignancy (NILM)  Negative for squamous intraepithelial lesion or malignancy  Electronically signed by Vicky Leiva CT (ASCP) on 3/7/2017 at 12:22 PM       PAP (Historical)    NIL    HPV 16 DNA Negative Negative      HPV 18 DNA Negative Negative      Other HR HPV Negative Negative              11/6/2024   Contraception/Family Planning   Questions about contraception or family planning (!) YES IUD           Reviewed and updated as needed this visit by Provider                    Current Outpatient Medications   Medication Sig Dispense Refill    Abatacept (ORENCIA CLICKJECT) 125 MG/ML SOAJ auto-injector Inject 1 mL (125 mg) subcutaneously every 7 days .  Hold if infection and seek medical attention 4 mL 6    Cetirizine HCl (ZYRTEC ALLERGY PO)       citalopram (CELEXA) 20 MG tablet Take 1.5 tablets (30 mg) by mouth daily. 150 tablet 3    cyanocobalamin (VITAMIN B-12) 100 MCG tablet Take 100 mcg by mouth daily      Omega-3 Fatty Acids (OMEGA 3 PO)       Vitamin D, Cholecalciferol, 25 MCG (1000 UT) CAPS Take 1,000 Units by mouth daily 90 capsule 12         Review of Systems  Constitutional, HEENT, cardiovascular, pulmonary, gi and gu systems are negative, except as otherwise noted.     Objective    Exam  /74   Pulse 86   Resp 16   Ht 1.683 m (5' 6.25\")   Wt 71.4 kg (157 lb 6.4 oz)   LMP  (LMP Unknown)   SpO2 99%   BMI 25.21 kg/m     Estimated body mass index is 25.21 kg/m  as calculated from the following:    Height as of this encounter: 1.683 m (5' 6.25\").    Weight as of this encounter: 71.4 kg (157 lb 6.4 oz).    Physical Exam  GENERAL: alert and no distress  EYES: Eyes grossly normal to inspection, PERRL and conjunctivae and sclerae normal  HENT: ear canals and TM's normal, nose and mouth without ulcers or lesions  NECK: no adenopathy, no asymmetry, masses, or scars  RESP: lungs clear to auscultation - no rales, rhonchi " or wheezes  BREAST: normal without masses, tenderness or nipple discharge and no palpable axillary masses or adenopathy  CV: regular rate and rhythm, normal S1 S2, no S3 or S4, no murmur, click or rub, no peripheral edema  ABDOMEN: soft, nontender, no hepatosplenomegaly, no masses and bowel sounds normal  MS: no gross musculoskeletal defects noted, no edema  SKIN: no suspicious lesions or rashes  NEURO: Normal strength and tone, mentation intact and speech normal  PSYCH: mentation appears normal, affect normal/bright        Signed Electronically by: Vicky Hu MD

## 2024-11-06 NOTE — PATIENT INSTRUCTIONS
Patient Education   Preventive Care Advice   This is general advice given by our system to help you stay healthy. However, your care team may have specific advice just for you. Please talk to your care team about your preventive care needs.  Nutrition  Eat 5 or more servings of fruits and vegetables each day.  Try wheat bread, brown rice and whole grain pasta (instead of white bread, rice, and pasta).  Get enough calcium and vitamin D. Check the label on foods and aim for 100% of the RDA (recommended daily allowance).  Lifestyle  Exercise at least 150 minutes each week  (30 minutes a day, 5 days a week).  Do muscle strengthening activities 2 days a week. These help control your weight and prevent disease.  No smoking.  Wear sunscreen to prevent skin cancer.  Have a dental exam and cleaning every 6 months.  Yearly exams  See your health care team every year to talk about:  Any changes in your health.  Any medicines your care team has prescribed.  Preventive care, family planning, and ways to prevent chronic diseases.  Shots (vaccines)   HPV shots (up to age 26), if you've never had them before.  Hepatitis B shots (up to age 59), if you've never had them before.  COVID-19 shot: Get this shot when it's due.  Flu shot: Get a flu shot every year.  Tetanus shot: Get a tetanus shot every 10 years.  Pneumococcal, hepatitis A, and RSV shots: Ask your care team if you need these based on your risk.  Shingles shot (for age 50 and up)  General health tests  Diabetes screening:  Starting at age 35, Get screened for diabetes at least every 3 years.  If you are younger than age 35, ask your care team if you should be screened for diabetes.  Cholesterol test: At age 39, start having a cholesterol test every 5 years, or more often if advised.  Bone density scan (DEXA): At age 50, ask your care team if you should have this scan for osteoporosis (brittle bones).  Hepatitis C: Get tested at least once in your life.  STIs (sexually  transmitted infections)  Before age 24: Ask your care team if you should be screened for STIs.  After age 24: Get screened for STIs if you're at risk. You are at risk for STIs (including HIV) if:  You are sexually active with more than one person.  You don't use condoms every time.  You or a partner was diagnosed with a sexually transmitted infection.  If you are at risk for HIV, ask about PrEP medicine to prevent HIV.  Get tested for HIV at least once in your life, whether you are at risk for HIV or not.  Cancer screening tests  Cervical cancer screening: If you have a cervix, begin getting regular cervical cancer screening tests starting at age 21.  Breast cancer scan (mammogram): If you've ever had breasts, begin having regular mammograms starting at age 40. This is a scan to check for breast cancer.  Colon cancer screening: It is important to start screening for colon cancer at age 45.  Have a colonoscopy test every 10 years (or more often if you're at risk) Or, ask your provider about stool tests like a FIT test every year or Cologuard test every 3 years.  To learn more about your testing options, visit:   .  For help making a decision, visit:   https://bit.ly/xm05745.  Prostate cancer screening test: If you have a prostate, ask your care team if a prostate cancer screening test (PSA) at age 55 is right for you.  Lung cancer screening: If you are a current or former smoker ages 50 to 80, ask your care team if ongoing lung cancer screenings are right for you.  For informational purposes only. Not to replace the advice of your health care provider. Copyright   2023 [employer]. All rights reserved. Clinically reviewed by the [facility] Transitions Program. AddSearch [id number] - REV 01/24.

## 2024-12-10 ENCOUNTER — OFFICE VISIT (OUTPATIENT)
Dept: FAMILY MEDICINE | Facility: CLINIC | Age: 39
End: 2024-12-10
Payer: COMMERCIAL

## 2024-12-10 VITALS
OXYGEN SATURATION: 99 % | BODY MASS INDEX: 25.04 KG/M2 | HEART RATE: 71 BPM | WEIGHT: 155.8 LBS | SYSTOLIC BLOOD PRESSURE: 120 MMHG | DIASTOLIC BLOOD PRESSURE: 80 MMHG | HEIGHT: 66 IN | TEMPERATURE: 99 F

## 2024-12-10 DIAGNOSIS — M05.9 SEROPOSITIVE RHEUMATOID ARTHRITIS (H): ICD-10-CM

## 2024-12-10 DIAGNOSIS — J02.9 SORE THROAT: Primary | ICD-10-CM

## 2024-12-10 LAB
DEPRECATED S PYO AG THROAT QL EIA: NEGATIVE
GROUP A STREP BY PCR: NOT DETECTED

## 2024-12-10 PROCEDURE — 87651 STREP A DNA AMP PROBE: CPT | Performed by: FAMILY MEDICINE

## 2024-12-10 PROCEDURE — 99213 OFFICE O/P EST LOW 20 MIN: CPT | Performed by: FAMILY MEDICINE

## 2024-12-10 NOTE — PROGRESS NOTES
"Assessment/ Plan     1. Sore throat (Primary)  She has had about a week history of sore throat with rapid strep.  We discussed symptomatic cares.  She will let me know if things do not continue to improve.  She is immune compromised, so let me know if anything worsens or spikes a fever.  - Streptococcus A Rapid Screen w/Reflex to PCR - Clinic Collect  - Group A Streptococcus PCR Throat Swab    2. Seropositive rheumatoid arthritis (H)  She is immune suppressed, taking Orencia      Subjective:      Geovanna Schwartz is a 39 year old female who presents for evaluation of sore throat.  She states last week she had kind of a respiratory type of illness but most of that has resolved and now she just has a really sore throat.  She had her tonsils removed when she was younger so it is hard to tell if anything is swollen.  She has not had a fever.    Relevant past medical, family, surgical, and social history reviewed with patient, unless noted in HPI, not pertinent for this visit.  Medications were discussed and reconciled.   Review of Systems   A 12 point comprehensive review of systems was negative except as noted.      Current Outpatient Medications   Medication Sig Dispense Refill    Abatacept (ORENCIA CLICKJECT) 125 MG/ML SOAJ auto-injector Inject 1 mL (125 mg) subcutaneously every 7 days .  Hold if infection and seek medical attention 4 mL 6    Cetirizine HCl (ZYRTEC ALLERGY PO)       citalopram (CELEXA) 20 MG tablet Take 1.5 tablets (30 mg) by mouth daily. 150 tablet 3    cyanocobalamin (VITAMIN B-12) 100 MCG tablet Take 100 mcg by mouth daily      Omega-3 Fatty Acids (OMEGA 3 PO)       Vitamin D, Cholecalciferol, 25 MCG (1000 UT) CAPS Take 1,000 Units by mouth daily 90 capsule 12         Objective:     /80   Pulse 71   Temp 99  F (37.2  C)   Ht 1.683 m (5' 6.25\")   Wt 70.7 kg (155 lb 12.8 oz)   LMP  (LMP Unknown)   SpO2 99%   BMI 24.96 kg/m      Body mass index is 24.96 kg/m .       General " appearance: alert, appears stated age and cooperative  Head: Normocephalic, without obvious abnormality, atraumatic  Eyes: conjunctivae/corneas clear.   Ears: normal TM's and external ear canals both ears  Nose: Nares normal. Septum midline. Mucosa normal. No drainage or sinus tenderness.  Throat: lips, mucosa, and tongue normal; teeth and gums normal  Neck: no adenopathy  Lungs: clear to auscultation bilaterally  Heart: regular rate and rhythm, S1, S2 normal, no murmur, click, rub or gallop         Recent Results (from the past week)   Streptococcus A Rapid Screen w/Reflex to PCR - Clinic Collect    Specimen: Throat; Swab   Result Value Ref Range    Group A Strep antigen Negative Negative          This note has been dictated using voice recognition software. Any grammatical or context distortions are unintentional and inherent to the software

## 2024-12-19 ENCOUNTER — LAB (OUTPATIENT)
Dept: LAB | Facility: CLINIC | Age: 39
End: 2024-12-19
Payer: COMMERCIAL

## 2024-12-19 DIAGNOSIS — M05.9 SEROPOSITIVE RHEUMATOID ARTHRITIS (H): ICD-10-CM

## 2024-12-19 LAB
ALBUMIN SERPL BCG-MCNC: 4.3 G/DL (ref 3.5–5.2)
ALP SERPL-CCNC: 54 U/L (ref 40–150)
ALT SERPL W P-5'-P-CCNC: 10 U/L (ref 0–50)
AST SERPL W P-5'-P-CCNC: 13 U/L (ref 0–45)
BASOPHILS # BLD AUTO: 0 10E3/UL (ref 0–0.2)
BASOPHILS NFR BLD AUTO: 1 %
BILIRUB DIRECT SERPL-MCNC: <0.2 MG/DL (ref 0–0.3)
BILIRUB SERPL-MCNC: 0.3 MG/DL
CREAT SERPL-MCNC: 0.81 MG/DL (ref 0.51–0.95)
CRP SERPL-MCNC: 4.34 MG/L
EGFRCR SERPLBLD CKD-EPI 2021: >90 ML/MIN/1.73M2
EOSINOPHIL # BLD AUTO: 0.2 10E3/UL (ref 0–0.7)
EOSINOPHIL NFR BLD AUTO: 2 %
ERYTHROCYTE [DISTWIDTH] IN BLOOD BY AUTOMATED COUNT: 11.3 % (ref 10–15)
ERYTHROCYTE [SEDIMENTATION RATE] IN BLOOD BY WESTERGREN METHOD: 7 MM/HR (ref 0–20)
HCT VFR BLD AUTO: 40.4 % (ref 35–47)
HGB BLD-MCNC: 14.2 G/DL (ref 11.7–15.7)
IMM GRANULOCYTES # BLD: 0 10E3/UL
IMM GRANULOCYTES NFR BLD: 0 %
LYMPHOCYTES # BLD AUTO: 2.1 10E3/UL (ref 0.8–5.3)
LYMPHOCYTES NFR BLD AUTO: 32 %
MCH RBC QN AUTO: 30.5 PG (ref 26.5–33)
MCHC RBC AUTO-ENTMCNC: 35.1 G/DL (ref 31.5–36.5)
MCV RBC AUTO: 87 FL (ref 78–100)
MONOCYTES # BLD AUTO: 0.8 10E3/UL (ref 0–1.3)
MONOCYTES NFR BLD AUTO: 12 %
NEUTROPHILS # BLD AUTO: 3.5 10E3/UL (ref 1.6–8.3)
NEUTROPHILS NFR BLD AUTO: 53 %
PLATELET # BLD AUTO: 212 10E3/UL (ref 150–450)
PROT SERPL-MCNC: 7.1 G/DL (ref 6.4–8.3)
RBC # BLD AUTO: 4.65 10E6/UL (ref 3.8–5.2)
WBC # BLD AUTO: 6.6 10E3/UL (ref 4–11)

## 2024-12-20 ENCOUNTER — TELEPHONE (OUTPATIENT)
Dept: RHEUMATOLOGY | Facility: CLINIC | Age: 39
End: 2024-12-20

## 2024-12-20 NOTE — TELEPHONE ENCOUNTER
PA Initiation    Medication: XELJANZ XR 11 MG PO TB24  Insurance Company: [employer] - Phone [phone] Fax [phone]  Pharmacy Filling the Rx:    Filling Pharmacy Phone:    Filling Pharmacy Fax:    Start Date: 12/20/2024    Key: [id number]

## 2024-12-20 NOTE — TELEPHONE ENCOUNTER
Prior Authorization Approval    Medication: XELJANZ XR 11 MG PO TB24  Authorization Effective Date: 12/20/2024  Authorization Expiration Date: 6/20/2025  Approved Dose/Quantity: 11mg 30 for 30 days  Reference #: Key: [id number]   Insurance Company: [employer] - Phone [phone] Fax [phone]  Expected CoPay: $ 255.2  CoPay Card Available:    Yes  Financial Assistance Needed: unknown, sent mychart to pt to discuss  Which Pharmacy is filling the prescription: [facility] - [place], [place] - [address] [place] [address] AVE   Pharmacy Notified: Yes  Patient Notified: Yes, sent mychart and left voicemail

## 2024-12-20 NOTE — TELEPHONE ENCOUNTER
Patient called back stating she applied for the copay card/assistance and she is waiting to hear back from them before placing her order.  She will call back if she has any trouble.

## 2024-12-21 LAB
GAMMA INTERFERON BACKGROUND BLD IA-ACNC: 0.01 IU/ML
M TB IFN-G BLD-IMP: NEGATIVE
M TB IFN-G CD4+ BCKGRND COR BLD-ACNC: 9.99 IU/ML
MITOGEN IGNF BCKGRD COR BLD-ACNC: 0.07 IU/ML
MITOGEN IGNF BCKGRD COR BLD-ACNC: 0.09 IU/ML
QUANTIFERON MITOGEN: 10 IU/ML
QUANTIFERON NIL TUBE: 0.01 IU/ML
QUANTIFERON TB1 TUBE: 0.08 IU/ML
QUANTIFERON TB2 TUBE: 0.1

## 2025-04-02 ENCOUNTER — MYC REFILL (OUTPATIENT)
Dept: FAMILY MEDICINE | Facility: CLINIC | Age: 40
End: 2025-04-02
Payer: COMMERCIAL

## 2025-04-02 DIAGNOSIS — F43.22 ADJUSTMENT DISORDER WITH ANXIOUS MOOD: ICD-10-CM

## 2025-04-03 RX ORDER — CITALOPRAM HYDROBROMIDE 20 MG/1
30 TABLET ORAL DAILY
Qty: 150 TABLET | Refills: 1 | Status: SHIPPED | OUTPATIENT
Start: 2025-04-03

## 2025-06-05 ENCOUNTER — VIRTUAL VISIT (OUTPATIENT)
Dept: PHARMACY | Facility: CLINIC | Age: 40
End: 2025-06-05
Attending: INTERNAL MEDICINE
Payer: COMMERCIAL

## 2025-06-05 ENCOUNTER — TELEPHONE (OUTPATIENT)
Dept: RHEUMATOLOGY | Facility: CLINIC | Age: 40
End: 2025-06-05
Payer: COMMERCIAL

## 2025-06-05 DIAGNOSIS — M05.9 SEROPOSITIVE RHEUMATOID ARTHRITIS (H): Primary | ICD-10-CM

## 2025-06-05 DIAGNOSIS — Z71.85 VACCINE COUNSELING: ICD-10-CM

## 2025-06-05 DIAGNOSIS — J30.2 SEASONAL ALLERGIC RHINITIS: ICD-10-CM

## 2025-06-05 DIAGNOSIS — Z78.9 TAKES DIETARY SUPPLEMENTS: ICD-10-CM

## 2025-06-05 DIAGNOSIS — F43.22 ADJUSTMENT DISORDER WITH ANXIETY: ICD-10-CM

## 2025-06-05 NOTE — PATIENT INSTRUCTIONS
"Recommendations from today's MTM visit:                                                    MTM (medication therapy management) is a service provided by a clinical pharmacist designed to help you get the most of out of your medicines.      Continue Xeljanz 11 mg daily.   You are approved for the [place] jeannine program through the end of 2025.     Complete routine lab monitoring every 3 months.  Due in July.      Consider the following vaccines:  Shingles (Shingrix)  Vaccine is 2-dose series. Second dose due 2-6 months after first.  Pneumonia (Prevnar 20)  COVID Booster    Follow-up: with Dr. Toussaint on 7//25/25.    It was great speaking with you today.  I value your experience and would be very thankful for your time in providing feedback in our clinic survey. In the next few days, you may receive an email or text message from [employer] with a link to a survey related to your  clinical pharmacist.\"     To schedule another MTM appointment, please call the clinic directly or you may call the MTM scheduling line at [phone].    My Clinical Pharmacist's contact information:                                                      Please feel free to contact me with any questions or concerns you have.      Filiberto Sam, PharmD  Medication Therapy Management Pharmacist  [facility]  Phone: [phone]    "

## 2025-06-05 NOTE — TELEPHONE ENCOUNTER
Prior Authorization Approval    Medication: XELJANZ XR 11 MG PO TB24  Authorization Effective Date: 6/5/2025  Authorization Expiration Date: 6/5/2026  Approved Dose/Quantity: 30 tablet per 30-day suppl  Reference #: MITCHELL (Key: [id number])   Insurance Company: [employer] - Phone [phone] Fax [phone]  Expected CoPay: $    CoPay Card Available:      Financial Assistance Needed: copay card exhausted - FPAP participant 2025  Which Pharmacy is filling the prescription: [facility] - [place], [place] - [address] [place] [address] [facility]  Pharmacy Notified: Yes - [place] APPROVAL*CL XELJANZ XR 11MG TB24, IS APPROVED BY [employer], EFFECTIVE 06/05/2025 THROUGH 06/05/2026, PA#: [id number]    Patient Notified: renewal        Thank You,     Cesar Vargas [facility]  Specialty Pharmacy Clinic [facility] Specialty  [email or web address]  [email or web address]  Phone: [phone]  Fax: [phone]

## 2025-06-05 NOTE — PROGRESS NOTES
Medication Therapy Management (MTM) Encounter    ASSESSMENT:                            Medication Adherence/Access: See below for considerations.    Rheumatoid arthritis: Patient has achieved improvement with Xeljanz and would benefit from continuing therapy. We discussed the time to onset following a gap in treatment and the [employer] program details. Patient is approved for the jeannine through 2025 and should have no issues with medication access. She will likely have to return to the Xeljanz co-pay card program for 2026 and pursue additional assistance if funds are exhausted. Encouraged patient to reach out with any medication access issues.     Vaccinations: Encouraged indicated non-live vaccines and avoidance of live vaccines. Per ACIP guidelines, patient is eligible for VACCINATION: Covid-19, Pneumococcal, and Zoster.     Allergy: Stable.      Mental Health - Anxiety: Stable.      Supplements: Stable.     PLAN:                            Continue Xeljanz 11 mg daily.   You are approved for the [employer] program through the end of 2025.     Complete routine lab monitoring every 3 months.  Due in July.      Consider the following vaccines:  Shingles (Shingrix)  Vaccine is 2-dose series. Second dose due 2-6 months after first.  Pneumonia (Prevnar 20)  COVID Booster    Follow-up: with Dr. Toussaint on 7//25/25.    SUBJECTIVE/OBJECTIVE:                          Geovanna Schwartz is a 39 year old female seen for an initial visit. She was referred to me from Toy Toussaint MD.    Reason for visit: Medication education and financial assistance.    Allergies/ADRs: Reviewed in chart  Past Medical History: Reviewed in chart  Tobacco: She reports that she has been smoking cigarettes. She has never used smokeless tobacco. Nicotine/Tobacco Cessation Plan - Information offered: Patient not interested at this time  Alcohol: 4-6 beverages / week    Medication Adherence/Access: Patient exhausted co-pay card funds and  is now approved for the [place] jeannine.    Rheumatoid arthritis  Xeljanz XR 11 mg daily (started 12/2024)    Patient started Xeljanz in December 2024 after losing benefit from Orencia. She reports experiencing significant improvements in her joint pain and stiffness since starting treatment. Had to hold Xeljanz for a COVID infection in March and noticed symptoms return. Restarted medication and quickly regained benefit. Exhausted co-pay card funds at the end of May and patient was unable to take the medication from 5/24-6/3. Currently noticing increased pain and stiffness in her hands, fingers, knees, and ankles. Morning stiffness lasts to midday. Reports swelling in her fingers has already improved some after resuming Xeljanz. Seldomly using ibuprofen for as needed pain.     Previous therapies: hydroxychloroquine (GI upset), sulfasalazine (partially effective; she thought methotrexate was replacing sulfasalazine so stopped it previously when it was supposed to be combination therapy; she notes that she has a hard time taking twice daily medications), Humira (effective, injection site reactions), Enbrel (injection site reactions), methotrexate (brain fog, fatigue, partial efficacy; stopped when on orencia and continued to do well on Orencia), Orencia (lost efficacy, 9/15/23-12/20/2024).     Last lab monitoring completed: 4/18/25    Pre-Biologic Screening:   Hep B Core Antibody  Non-reactive (9/28/20)    Hep B Surface Antigen Non-reactive (9/28/20)    Hep C Antibody  Non-reactive (9/28/20)    HIV Antigen Antibody Non-reactive (9/28/20)    Quantiferon TB Gold Negative (12/19/24)      Vaccinations  Immunization History   Covid-19 vaccine ([id number] version)  Due to receive   Influenza (annual) Up-to-date, avoid live FluMist   Pneumococcal Eligible to receive with immunomodulator start   Tetanus/Tdap  Up-to-date   Shingrix Eligible to receive with immunomodulator start   All patients on biologics should avoid live  vaccines (varicella/VZV, intranasal influenza, MMR, or yellow fever vaccine (if traveling))       Allergy   Ortho Molecular D-Hist daily    Cetirizine stopped worked after March COVID infection. Recently switched to D-Hist supplement and feels it is working well to control symptoms. Taking daily during allergy season and hoping to take seasonally in the future.      Mental Health - Anxiety  Citalopram 30 mg daily    Patient reports citalopram is working well to control her anxiety. Previously tried to back off medication and experienced worsening symptoms. No side effects or concerns at this time.      Supplements   Probiotic daily   Vitamin B Complex daily   Vitamin D 2000 international unit(s) daily - not taking    Patient is working with a naturopath for her supplements. No longer taking vitamin D due to elevated levels on recent labs. Started vitamin B complex for general health. Probiotic blend is helping with acid reflux. She feels the supplements are helpful and reported no issues at this time.      Today's Vitals: There were no vitals taken for this visit.  ----------------    I spent 30 minutes with this patient today. I offer these suggestions for consideration by Toy Toussaint MD.     A summary of these recommendations was sent via [employer].    Filiberto Sam, PharmD  Medication Therapy Management Pharmacist  [facility]  Phone: [phone]     Telemedicine Visit Details  The patient's medications can be safely assessed via a telemedicine encounter.  Type of service:  Telephone visit  Originating Location (pt. Location): Home    Distant Location (provider location):  Off-site  Start Time: 9:00 AM  End Time: 9:30 AM     Medication Therapy Recommendations  Vaccine counseling   1 Rationale: Preventive therapy - Needs additional medication therapy - Indication   Recommendation: Provide Education - Shingrix 50 MCG/0.5ML Susr   Status: Patient Agreed - Adherence/Education   Identified  Date: 6/5/2025 Completed Date: 6/5/2025   Note: Also due for pneumonia and COVID

## 2025-07-24 ENCOUNTER — LAB (OUTPATIENT)
Dept: LAB | Facility: CLINIC | Age: 40
End: 2025-07-24
Payer: COMMERCIAL

## 2025-07-24 DIAGNOSIS — M05.9 SEROPOSITIVE RHEUMATOID ARTHRITIS (H): ICD-10-CM

## 2025-07-24 DIAGNOSIS — Z79.899 HIGH RISK MEDICATION USE: ICD-10-CM

## 2025-07-24 LAB
BASOPHILS # BLD AUTO: 0 10E3/UL (ref 0–0.2)
BASOPHILS NFR BLD AUTO: 1 %
EOSINOPHIL # BLD AUTO: 0.2 10E3/UL (ref 0–0.7)
EOSINOPHIL NFR BLD AUTO: 3 %
ERYTHROCYTE [DISTWIDTH] IN BLOOD BY AUTOMATED COUNT: 11.7 % (ref 10–15)
ERYTHROCYTE [SEDIMENTATION RATE] IN BLOOD BY WESTERGREN METHOD: 5 MM/HR (ref 0–20)
HCT VFR BLD AUTO: 41.9 % (ref 35–47)
HGB BLD-MCNC: 14.3 G/DL (ref 11.7–15.7)
IMM GRANULOCYTES # BLD: 0 10E3/UL
IMM GRANULOCYTES NFR BLD: 0 %
LYMPHOCYTES # BLD AUTO: 1.9 10E3/UL (ref 0.8–5.3)
LYMPHOCYTES NFR BLD AUTO: 24 %
MCH RBC QN AUTO: 31.2 PG (ref 26.5–33)
MCHC RBC AUTO-ENTMCNC: 34.1 G/DL (ref 31.5–36.5)
MCV RBC AUTO: 92 FL (ref 78–100)
MONOCYTES # BLD AUTO: 0.6 10E3/UL (ref 0–1.3)
MONOCYTES NFR BLD AUTO: 8 %
NEUTROPHILS # BLD AUTO: 5.2 10E3/UL (ref 1.6–8.3)
NEUTROPHILS NFR BLD AUTO: 65 %
PLATELET # BLD AUTO: 251 10E3/UL (ref 150–450)
RBC # BLD AUTO: 4.58 10E6/UL (ref 3.8–5.2)
WBC # BLD AUTO: 8.1 10E3/UL (ref 4–11)

## 2025-08-05 ENCOUNTER — MYC REFILL (OUTPATIENT)
Dept: FAMILY MEDICINE | Facility: CLINIC | Age: 40
End: 2025-08-05
Payer: COMMERCIAL

## 2025-08-05 DIAGNOSIS — F43.22 ADJUSTMENT DISORDER WITH ANXIOUS MOOD: ICD-10-CM

## 2025-08-05 RX ORDER — CITALOPRAM HYDROBROMIDE 20 MG/1
30 TABLET ORAL DAILY
Qty: 150 TABLET | Refills: 1 | OUTPATIENT
Start: 2025-08-05

## 2025-08-27 ENCOUNTER — LAB (OUTPATIENT)
Dept: LAB | Facility: CLINIC | Age: 40
End: 2025-08-27
Payer: COMMERCIAL

## 2025-08-27 DIAGNOSIS — Z79.899 HIGH RISK MEDICATION USE: ICD-10-CM

## 2025-08-27 DIAGNOSIS — M05.9 SEROPOSITIVE RHEUMATOID ARTHRITIS (H): ICD-10-CM

## 2025-08-27 LAB
BASOPHILS # BLD AUTO: <0.04 10E3/UL (ref 0–0.2)
BASOPHILS NFR BLD AUTO: 0 %
CREAT SERPL-MCNC: 0.76 MG/DL (ref 0.51–0.95)
EGFRCR SERPLBLD CKD-EPI 2021: >90 ML/MIN/1.73M2
EOSINOPHIL # BLD AUTO: 0.05 10E3/UL (ref 0–0.7)
EOSINOPHIL NFR BLD AUTO: 0.6 %
ERYTHROCYTE [DISTWIDTH] IN BLOOD BY AUTOMATED COUNT: 11.6 % (ref 10–15)
HCT VFR BLD AUTO: 40.8 % (ref 35–47)
HGB BLD-MCNC: 13.8 G/DL (ref 11.7–15.7)
IMM GRANULOCYTES # BLD: <0.04 10E3/UL
IMM GRANULOCYTES NFR BLD: 0.1 %
LYMPHOCYTES # BLD AUTO: 2.06 10E3/UL (ref 0.8–5.3)
LYMPHOCYTES NFR BLD AUTO: 25.7 %
MCH RBC QN AUTO: 31.2 PG (ref 26.5–33)
MCHC RBC AUTO-ENTMCNC: 33.8 G/DL (ref 31.5–36.5)
MCV RBC AUTO: 92.1 FL (ref 78–100)
MONOCYTES # BLD AUTO: 0.65 10E3/UL (ref 0–1.3)
MONOCYTES NFR BLD AUTO: 8.1 %
NEUTROPHILS # BLD AUTO: 5.25 10E3/UL (ref 1.6–8.3)
NEUTROPHILS NFR BLD AUTO: 65.5 %
PLATELET # BLD AUTO: 207 10E3/UL (ref 150–450)
RBC # BLD AUTO: 4.43 10E6/UL (ref 3.8–5.2)
WBC # BLD AUTO: 8.02 10E3/UL (ref 4–11)

## 2025-08-27 PROCEDURE — 85025 COMPLETE CBC W/AUTO DIFF WBC: CPT

## 2025-08-27 PROCEDURE — 80076 HEPATIC FUNCTION PANEL: CPT

## 2025-08-27 PROCEDURE — 36415 COLL VENOUS BLD VENIPUNCTURE: CPT

## 2025-08-27 PROCEDURE — 82565 ASSAY OF CREATININE: CPT

## 2025-08-28 LAB
ALBUMIN SERPL BCG-MCNC: 4.6 G/DL (ref 3.5–5.2)
ALP SERPL-CCNC: 54 U/L (ref 40–150)
ALT SERPL W P-5'-P-CCNC: 13 U/L (ref 0–50)
AST SERPL W P-5'-P-CCNC: 28 U/L (ref 0–45)
BILIRUB SERPL-MCNC: 0.5 MG/DL
BILIRUBIN DIRECT (ROCHE PRO & PURE): 0.12 MG/DL (ref 0–0.45)
PROT SERPL-MCNC: 7.4 G/DL (ref 6.4–8.3)